# Patient Record
Sex: MALE | URBAN - METROPOLITAN AREA
[De-identification: names, ages, dates, MRNs, and addresses within clinical notes are randomized per-mention and may not be internally consistent; named-entity substitution may affect disease eponyms.]

---

## 2023-05-05 ENCOUNTER — TELEPHONE (OUTPATIENT)
Dept: NEUROLOGY | Facility: CLINIC | Age: 53
End: 2023-05-05
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
## 2023-05-10 RX ORDER — CITALOPRAM 40 MG/1
40 TABLET ORAL DAILY
COMMUNITY
Start: 2023-02-03

## 2023-05-10 RX ORDER — CHOLECALCIFEROL (VITAMIN D3) 125 MCG
CAPSULE ORAL DAILY
COMMUNITY
Start: 2023-04-19

## 2023-05-12 ENCOUNTER — TELEPHONE (OUTPATIENT)
Dept: NEUROLOGY | Facility: CLINIC | Age: 53
End: 2023-05-12

## 2023-05-12 NOTE — TELEPHONE ENCOUNTER
I do not see documentation that someone called pt  Pt has an appt  Monday 5/12 with dr Damaris Mayes       Did the  or MA call pt?
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
## 2023-05-15 ENCOUNTER — OFFICE VISIT (OUTPATIENT)
Dept: NEUROLOGY | Facility: CLINIC | Age: 53
End: 2023-05-15

## 2023-05-15 VITALS
BODY MASS INDEX: 38.54 KG/M2 | TEMPERATURE: 98.2 F | DIASTOLIC BLOOD PRESSURE: 86 MMHG | OXYGEN SATURATION: 96 % | HEIGHT: 73 IN | WEIGHT: 290.8 LBS | HEART RATE: 71 BPM | SYSTOLIC BLOOD PRESSURE: 124 MMHG

## 2023-05-15 DIAGNOSIS — F43.10 PTSD (POST-TRAUMATIC STRESS DISORDER): ICD-10-CM

## 2023-05-15 DIAGNOSIS — G47.9 SLEEP DIFFICULTIES: ICD-10-CM

## 2023-05-15 DIAGNOSIS — E66.9 OBESITY (BMI 30-39.9): ICD-10-CM

## 2023-05-15 DIAGNOSIS — F41.9 ANXIETY AND DEPRESSION: ICD-10-CM

## 2023-05-15 DIAGNOSIS — R26.89 BALANCE PROBLEM: ICD-10-CM

## 2023-05-15 DIAGNOSIS — G43.009 MIGRAINE WITHOUT AURA AND WITHOUT STATUS MIGRAINOSUS, NOT INTRACTABLE: ICD-10-CM

## 2023-05-15 DIAGNOSIS — R29.818 SUSPECTED SLEEP APNEA: ICD-10-CM

## 2023-05-15 DIAGNOSIS — R41.3 MEMORY PROBLEM: ICD-10-CM

## 2023-05-15 DIAGNOSIS — F32.A ANXIETY AND DEPRESSION: ICD-10-CM

## 2023-05-15 DIAGNOSIS — Z87.820 HISTORY OF CONCUSSION: Primary | ICD-10-CM

## 2023-05-15 RX ORDER — RIZATRIPTAN BENZOATE 10 MG/1
10 TABLET ORAL AS NEEDED
Qty: 10 TABLET | Refills: 3 | Status: SHIPPED | OUTPATIENT
Start: 2023-05-15

## 2023-05-15 RX ORDER — TOPIRAMATE 25 MG/1
TABLET ORAL
Qty: 120 TABLET | Refills: 5 | Status: SHIPPED | OUTPATIENT
Start: 2023-05-15

## 2023-05-15 NOTE — PATIENT INSTRUCTIONS
Additional Testing or Referrals:   -MRI brain  -Sleep study  -Labs: B12, Folate, TSH  -Referral: Occupational therapy    Headache/migraine treatment:   Abortive medications (for immediate treatment of a headache): Ok to take ibuprofen or acetaminophen for headaches, but try to limit the amount and frequency that you are taking to avoid medication overuse/rebound headache  Ideally no more than 3 days per week  Prescription Abortive  Rizatriptan (MAXALT) 10 MG tablet; Take 1 tablet (10 mg total) by mouth once as needed for migraine May repeat in 2 hours if needed  Max 2/24 hours, 10/month  Discussed proper use, possible side effects and risks  Prescription preventive medications for headaches/migraines   (to take every day to help prevent headaches - not to take at the time of headache):  - Topiramate 25 mg nightly for 1 week, then increase to 25 mg in a m  And 25 mg in p m  For 1 week, then take 25 mg in a m  And 50 mg in p m  For 1 week, then take 50 mg in a m  and 50 mg in p m  And continue  - generally the common side effects improve as your body gets used to the medication  If we need to spread out a more gradual increase of the medication on a longer scale we can, just call if any questions or concerns  - if necessary, if the a m  dose is causing side effects we can always have you take the full dose at night instead    *Typically these types of medications take time untill you see the benefit, although some may see improvement in days, often it may take weeks, especially if the medication is being titrated up to a beneficial level  Please contact us if there are any concerns or questions regarding the medication  Lifestyle Recommendations:  - Maintain good sleep hygiene  Going to bed and waking up at consistent times, avoiding excessive daytime naps, avoiding caffeinated beverages in the evening, avoid excessive stimulation in the evening and generally using bed primarily for sleeping    One hour before bedtime would recommend turning lights down lower, decreasing your activity (may read quietly, listen to music at a low volume)  When you get into bed, should eliminate all technology (no texting, emailing, playing with your phone, iPad or tablet in bed)  - Maintain good hydration  Drink  2L of fluid a day (4 typical small water bottles)  - Maintain good nutrition  In particular don't skip meals and eat balanced meals regularly  Education and Follow-up  - Please contact us if any questions or concerns arise  Of course, try to protect yourself from head injuries, and if any new concerning symptoms or significant blow to the head or body go to the emergency department    - Follow up in 6 months

## 2023-05-15 NOTE — PROGRESS NOTES
Review of Systems   Constitutional: Negative  Negative for appetite change and fever  HENT: Negative  Negative for hearing loss, tinnitus, trouble swallowing and voice change  Eyes: Negative  Negative for photophobia, pain and visual disturbance  Respiratory: Negative  Negative for shortness of breath  Cardiovascular: Negative  Negative for palpitations  Gastrointestinal: Negative  Negative for nausea and vomiting  Dry heaving   Endocrine: Negative  Negative for cold intolerance  Genitourinary: Negative  Negative for dysuria, frequency and urgency  Musculoskeletal: Negative  Negative for gait problem, myalgias and neck pain  Skin: Negative  Negative for rash  Allergic/Immunologic: Negative  Neurological: Positive for tremors (left hand) and headaches  Negative for dizziness, seizures, syncope, facial asymmetry, speech difficulty, weakness, light-headedness and numbness  Hematological: Negative  Does not bruise/bleed easily  Psychiatric/Behavioral: Positive for sleep disturbance  Negative for agitation, confusion and hallucinations  Memory loss  Mood differences   All other systems reviewed and are negative
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
## 2023-05-18 ENCOUNTER — TELEPHONE (OUTPATIENT)
Dept: SLEEP CENTER | Facility: CLINIC | Age: 53
End: 2023-05-18

## 2023-05-18 NOTE — TELEPHONE ENCOUNTER
----- Message from Bruno Calle MD sent at 5/17/2023  2:35 PM EDT -----  Approved    ----- Message -----  From: Scott العلي  Sent: 5/16/2023   8:21 AM EDT  To: Sleep Medicine [facility] Provider    This home sleep study needs approval      If approved please sign and return to clerical pool  If denied please include reasons why  Also provide alternative testing if warranted  Please sign and return to clerical pool

## 2023-05-23 ENCOUNTER — EVALUATION (OUTPATIENT)
Dept: OCCUPATIONAL THERAPY | Facility: CLINIC | Age: 53
End: 2023-05-23

## 2023-05-23 DIAGNOSIS — R41.3 MEMORY PROBLEM: ICD-10-CM

## 2023-05-23 DIAGNOSIS — F43.10 PTSD (POST-TRAUMATIC STRESS DISORDER): ICD-10-CM

## 2023-05-23 DIAGNOSIS — Z87.820 HISTORY OF CONCUSSION: Primary | ICD-10-CM

## 2023-05-23 DIAGNOSIS — F41.9 ANXIETY AND DEPRESSION: ICD-10-CM

## 2023-05-23 DIAGNOSIS — F32.A ANXIETY AND DEPRESSION: ICD-10-CM
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
## 2023-05-26 ENCOUNTER — TELEPHONE (OUTPATIENT)
Dept: NEUROLOGY | Facility: CLINIC | Age: 53
End: 2023-05-26

## 2023-05-26 NOTE — TELEPHONE ENCOUNTER
Received from Matilda [employer] 429 request for Attending Physicians Statement to be completed for patient and request for medical records  Please advise if Dr Ginger Ross would be agreeable to complete the form so charges can dropped and patient contacted for payment    Form was scanned into  and also faxed to [facility] for records request

## 2023-05-30 ENCOUNTER — EVALUATION (OUTPATIENT)
Dept: SPEECH THERAPY | Facility: CLINIC | Age: 53
End: 2023-05-30

## 2023-05-30 ENCOUNTER — EVALUATION (OUTPATIENT)
Dept: PHYSICAL THERAPY | Facility: CLINIC | Age: 53
End: 2023-05-30

## 2023-05-30 DIAGNOSIS — R41.3 MEMORY PROBLEM: ICD-10-CM

## 2023-05-30 DIAGNOSIS — Z87.820 HISTORY OF CONCUSSION: ICD-10-CM

## 2023-05-30 DIAGNOSIS — R26.89 IMBALANCE: Primary | ICD-10-CM

## 2023-05-30 DIAGNOSIS — R48.8 OTHER SYMBOLIC DYSFUNCTIONS: ICD-10-CM

## 2023-05-30 DIAGNOSIS — G44.86 CERVICOGENIC HEADACHE: ICD-10-CM

## 2023-05-30 DIAGNOSIS — R41.841 COGNITIVE COMMUNICATION DEFICIT: Primary | ICD-10-CM

## 2023-05-30 NOTE — PROGRESS NOTES
Speech-Language Pathology Initial Evaluation    Today's date: 2023   Patient’s name: Angelina Rascon  : 1970  MRN: [id number]  Safety measures:   Referring provider: DO Shazia Jc Diagnosis     ICD-10-CM    1  History of concussion  Z87 820 Ambulatory Referral to Speech Therapy      2  Memory problem  R41 3 Ambulatory Referral to Speech Therapy        Visit tracking:  -Referring provider: Epic  -Billing guidelines: AMA  -Visit # 1  -Insurance: [employer]  -RE due: 23    Subjective comments: Pleasant/Cooperative    How did the patient hear about us? Physician    Patient's goal(s): Functioning at work - improve executive functioning  Currently has lack of concentration and focus to do even simple tasks  Reason for referral: Change in cognitive status  Prior functional status: Communication effective and appropriate in all situations  Clinically complex situations: N/A    History: Patient is a 47 y o  male who was referred to outpatient skilled Speech Therapy services for a cognitive-linguistic evaluation  Patient with vast history of concussions ever since he was very young  Patient referred to SLP from OT evaluation  Patient accompanied by long term girlfriend/ Sherrie Mireles  Hearing: WFL  Vision: near sighted, asigmatism    Home environment/lifestyle: Sherrie Mireles - girlfriend  Highest level of education: Bachelors Degree  Vocational status: Works in regulatory affairs   - indicated workplace would benefit from guidance in managing cognition in work tasks      Mental status: Alert  Behavior status: Cooperative  Patient reported symptoms of: depression, anxiety and frustration  Communication modalities: Verbal  Rehabilitation prognosis: Fair rehab potential to reach the established goals    Assessments    The Repeatable Battery for the Assessment of Neuropsychological Status (RBANS) is a brief, individually-administered assessment which measures attention, language, visuospatial/constructional abilities, and immediate & delayed memory  The RBANS is intended for use with adolescents to adults, ages 15 to 80 years  The following results were obtained during the administration of the assessment  Form: A    Cognitive Domain/Subtest: Index Score: Percentile Rank: Classification:   IMMEDIATE MEMORY 100 50%ile Average        1  List Learning (26/40)        2  Story Memory (18/24)       VISUOSPATIAL/  CONSTRUCTIONAL 121 92%ile Superior        3  Figure Copy (20/20)        4  Line Orientation (19/20)       LANGUAGE 105 63%ile Average        5  Picture Naming (10/10)        6  Semantic Fluency (25/40)       ATTENTION 112 79%ile High Average        7  Digit Span (10/16)        8  Coding (59/89)       DELAYED MEMORY 108 70%ile Average        9  List Recall (6/10)        10  List Recognition (20/20)        11  Story Recall (11/12)        12  Figure Recall (17/20)         Sum of Index Scores:  546   Total Score:  113   Percentile: 81%ile   Classification: High Average       *Patient named 25  concrete category members () in 60 sec (norm=15+)  -- AVERAGE        Goals    Short-term goals:    Complete executive function task list     Complete falls, risk, harm assessment  Patient will be educated on word finding strategies (i e , circumlocution) for improved generative naming and verbal expression skills  Patient will complete complex auditory attention processing tasks (e g , sentence unscramble, ranking numbers/words, etc ) in increased distractible environment to improve working memory with 80% accuracy  Patient will facilitate planning by completing thought organization tasks (e g , sequencing, deduction puzzles, etc ) with 80% accuracy to facilitate increased executive functioning, working memory, problem solving, and processing skills      Patient will complete auditory immediate and short term memory tasks to 80% accuracy to facilitate increased ability to retell narratives and recall information within functional living environment  To target mental manipulation and working memory, patient will participate in word finding activity (i e , anagrams) with 80% accuracy  Patient will answer questions regarding story read aloud in quiet and distractible environment with 80% accuracy to facilitate improved auditory comprehension and recall  Patient will complete reading comprehension tasks (e g , answering questions, following complex written directions, etc ) to 80% accuracy to facilitate carryover of comprehension of functional reading materials  Patient will demonstrate divided attention by responding to multiple tasks or details within tasks at the same time with min cues in a distracting environment with 80% accuracy  Patient will complete semi complex & complex tasks of increasing difficulty with multiple steps with fading cues to increase organization and accuracy with 80% accuracy  Patient will express understanding of ways to maximize attention/focus, problem solving and recall in work place tasks with independence  Patient will be educated on the use of internal and external memory aids and compensatory strategies to maximize attention/focus with 80% accuracy to facilitate increased recall of routine, personal information, and recent events  Long Term    Patient will complete cognitive-linguistic therapy that addresses patient's specific deficits in processing speed, short-term working memory, attention to detail, monitoring, sequencing, and organization skills, with instruction, to alleviate effects of executive functioning disorder deficits by discharge  Patient will complete higher-level expressive language tasks (e g , word definitions, idioms, synonym/antonyms, etc) with 80% accuracy to improve functional communication skills by discharge       Patient will improve ability to facilitate cognitive function and communication skills including use of "compensatory strategies in a variety of functional living tasks to improve quality of like and to maximize level of independence  Impressions/Recommendations    Impressions:   -Patient presents with cognitive deficits (mild) in areas of recall, word retrieval, and executive functioning as a result of vast history of many concussions  Patient noted he experiences rage and anxiety which likely further negatively complicates patient's ability to perform home, work & community tasks  Although patient performed quite well in the Repeatable Battery Asssessment of Neuropsychological Status- Form A (RBANS) with an overall score of \"High Average\" and sub areas performing with a range of Average to Superior, patient still recommended for cognitive therapy as this SLP feels this formal assessment did not capture patient's continual and daily cognitive challenges  Goals established above and do highlight to be completed in range of distraction  Education will be the initial of focus on therapy and will focus on ways to maximize focus/attention, recall & word retrieval at home, community and work environments  Further therapy will include cognitive retraining tasks  Recommendations:  -Patient would benefit from outpatient skilled Speech Therapy services: Cognitive-linguistic therapy    -Frequency: 2x weekly  -Duration: 6-8 weeks    -Intervention certification from: 9/92/9812  -Intervention certification to: 9/48/6305    -Intervention comments:   Dayna Herzog, unable to do taxes, strategies for word retrieval, recall, attention, anxiety/stress, coping mechanisms, workplace tasks list and ways to maximize cognition, executive function list, word retrieval tasks, executive function tasks of increased difficulty in increased distractible environments, deduction puzzles, memory tasks with increased distraction, ?  Melodic intonation therapy  "

## 2023-05-30 NOTE — PROGRESS NOTES
"PT Evaluation     Today's date: 2023  Patient name: Angelina Rascon  : 1970  MRN: [id number]  Referring provider: Parvin Espinoza DO  Dx:   Encounter Diagnosis     ICD-10-CM    1  History of concussion  Z87 820 Ambulatory Referral to Physical Therapy                     Assessment  Assessment details: Pt is a 47 y o  male who presents to OP PT for IE following referral for h/o concussion  Pt c/o decreased balance and \"pressure in L side of head\"  Pt w/ other complaints consistent w/ OT and ST needs, has services in place already  Upon formal assessment pt demonstrates, + cervical flexion rotation test to L indicating likelihood of cervicogenic HA  Pt also w/ TTP and hypertonia in SO, UT, LS, SCM, and AS muscles  Given these findings pt is recommended for skilled PT intervention 2x wk  FGA testing does not indicate pt is at an elevated risk for falls but given pt's typical activities and high rates of self-reported falls he would benefit from higher level balance training in addition to treatment of cervicogenic HA  Pt agreeable to POC  Pt also to acquire referral for ortho PT to address c/o R hip and knee pain  Understanding of Dx/Px/POC: good   Prognosis: good    Goals  STGs (to be achieved w/i 2-4 weeks)  1  Will obtain referral for ortho PT for R hip/knee pain  LTGs (to be achieved within 6-8 weeks)   1  Pt will be I with HEP at d/c to promote PT carry-over  2  Pt will meet FOTO predicted score  3  Pt will report improvements in \"head pressure\", no greater than 2/10 1-2x week  4  Pt will report increased confidence in balance abilities         Plan  Planned therapy interventions: joint mobilization, therapeutic activities, therapeutic exercise, neuromuscular re-education and gait training  Frequency: 2x week  Duration in weeks: 8  Treatment plan discussed with: patient (SO)        Subjective Evaluation    History of Present Illness  Mechanism of injury: Pt with history of multiple " concussions/ head traumas from various mechanisms, with the most recent being approx 6 years ago, when he had a coughing spell, leaned forward, passed out and fell on his head  Pt reports cont progression of symptoms over the last 5 years, with worsening symptoms over the last 6 months  Symptoms include: imbalance, L hand tremor, numbness in R 3 digit of hand, sensitivity to light, and dull pressure of L frontal and parietal regions of head  Also c/o non-PT related symptoms including confusion, memory and executive function deficits, and increased anxiety  Pt is no longer driving 2/2 symptoms  HA/head pressure is worsened by lack of sleep, working on computer, and hard/new tasks  H/o neck and B shoulder issues including subluxing at shoulders  Balance issues began several years ago and are worsening  Nearly falls at least once a week  Denies neuropathy or LE radiculopathy  Does have R hip and knee issues and has to stop and stretch IT several times during 1 5 mile walks he does on several times per week  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  Objective     Palpation   Left   Hypertonic in the cervical paraspinals, scalenes, sternocleidomastoid, suboccipitals and upper trapezius  Tenderness of the cervical paraspinals, scalenes, sternocleidomastoid, suboccipitals and upper trapezius  Trigger point to scalenes and upper trapezius  Right   Hypertonic in the cervical paraspinals, scalenes, sternocleidomastoid, suboccipitals and upper trapezius  Tenderness of the cervical paraspinals, scalenes, sternocleidomastoid, suboccipitals and upper trapezius  Trigger point to scalenes       Active Range of Motion   Cervical/Thoracic Spine     Normal active range of motion    Tests   Cervical     Left   Positive cervical flexion-rotation test     Neuro Exam:     Oculomotor exam   Oculomotor ROM: WNL  Smooth pursuits: within normal limits  Vertical saccades: hypometric  Horizontal saccades: hypometric   Convergence: normal  Dynamic head: VOR testing: blurriness of target @ 1 Hz    Coordination   Heel to shin: left WNL and right WNL  Finger to nose: left dysmetria and right dysmetria (R>L)  Rapid alternating movements: UE WNL and LE impaired     Outcome measure IE   FGA 28/30   Kay 50/56                MCTSIB Number of Seconds   Feet Together, Eyes Open 30   Feet Together, Eyes Closed 30   Feet Together, Eyes Open Foam 30   Feet Together, Eyes Closed Foam 30 w/ minimal sway            Precautions: cluster HA, PTSD     * HEP: give NV please      Manuals 5/30            SOR VR            Manual cervical traction VR            PA mobs (C3-C5) VR            Unilateral PA pressure L side VR            Neuro Re-Ed             Walking over hurdles w/ foam between             Tandem amb NV Give for HEP           Walking on foam w/ cone taps             Walking over hidden objects                                                    Ther Ex             Pt education HEP, POC, findings, adding R hip/knee dx            UT stretch NV Give for HEP           LS stretch NV Give for HEP           Cervical rotation SNAGs NV            Cervical ext SNAGs NV                                                   Ther Activity                                       Gait Training                                       Modalities

## 2023-05-31 DIAGNOSIS — M25.551 RIGHT HIP PAIN: ICD-10-CM

## 2023-05-31 DIAGNOSIS — M76.30 IT BAND SYNDROME: Primary | ICD-10-CM

## 2023-06-01 ENCOUNTER — OFFICE VISIT (OUTPATIENT)
Dept: OCCUPATIONAL THERAPY | Facility: CLINIC | Age: 53
End: 2023-06-01

## 2023-06-01 DIAGNOSIS — Z87.820 HISTORY OF CONCUSSION: Primary | ICD-10-CM

## 2023-06-01 DIAGNOSIS — F41.9 ANXIETY AND DEPRESSION: ICD-10-CM

## 2023-06-01 DIAGNOSIS — F32.A ANXIETY AND DEPRESSION: ICD-10-CM

## 2023-06-01 DIAGNOSIS — F43.10 PTSD (POST-TRAUMATIC STRESS DISORDER): ICD-10-CM

## 2023-06-01 DIAGNOSIS — R41.3 MEMORY PROBLEM: ICD-10-CM

## 2023-06-01 NOTE — PROGRESS NOTES
"  Occupational Therapy Daily Note:    Today's date: 2023  Patient name: Oksana Elizondo  : 1970  MRN: [id number]  Referring provider: Cristina Richard, DO  Dx:   Encounter Diagnoses   Name Primary? • History of concussion Yes   • Memory problem    • Anxiety and depression    • PTSD (post-traumatic stress disorder)        Subjective: \"I've been okay\"    Objective: Pt engaged in skilled OT treatment session with focus on fxnl cognition, short term memory, healthy coping education and leisure pursuits to increase engagement, endurance, tolerance, and independence with daily ADL and IADL tasks  CPT Code Minutes                                           Task Details        Therapeutic Activity  Pt engaged in multiple activities to improve functional cognition and memory  Pt education on memory strategies of repition, reading out loud, and note-taking  Auditory Memory Test 1    Pt recalled 4/16 short story details on after listening to the story once  After hearing story again, pt recalled 15/16 story details  Auditory Memory Test 2   Pt recalled 80% after first trial  During second trial, with note-taking, pt was able to recall 100%  Reading Comprehension Task:  Pt presented with four paragraph story and instructed to read through it twice  Pt answered questions with 100% accuracy  Anxiety Management:   Began discussion of anxiety management techniques  Pt does not currently have any  Working on developing strategies for pt, including identification of leisure pursuits to address anxiety and depression  Leisure Pursuits:   Pt identified 5 activities that he enjoys and makes him happy  (Kids, drumming, animals, gardening (physical; not planning), cooking)                  Neuro Re-Ed               Therapeutic Exercise               Manual          Self Care           Assessment: Tolerated treatment well  Pt performed better with use of memory strategies   Pt reports taking minimal time " to do things for himself that make him happy  Patient would benefit from continued skilled OT  Plan: Continued skilled OT per POC       PLANNED THERAPY INTERVENTIONS:  Therapeutic activity  Therapeutic exercise  Functional cognition   IADL re-training  RTW simulations   Internal and external memory aides  Sustained/alternating/divided attention  Memory and mental manipulation  Auditory processing with immediate recall  Memory retention with immediate and delayed recall  Sensory re-ed           INTERVENTION COMMENTS:  Diagnosis: History of concussion [Z87 820]  Precautions:   Insurance: Payor: Mich Casillas / Plan: Mich Casillas PPO / Product Type: PPO /   Visits: 2  PN due 6/20/23

## 2023-06-02 ENCOUNTER — APPOINTMENT (OUTPATIENT)
Dept: OCCUPATIONAL THERAPY | Facility: CLINIC | Age: 53
End: 2023-06-02
Payer: COMMERCIAL

## 2023-06-02 ENCOUNTER — HOSPITAL ENCOUNTER (OUTPATIENT)
Dept: MRI IMAGING | Facility: HOSPITAL | Age: 53
End: 2023-06-02
Attending: STUDENT IN AN ORGANIZED HEALTH CARE EDUCATION/TRAINING PROGRAM
Payer: COMMERCIAL

## 2023-06-02 DIAGNOSIS — Z87.820 HISTORY OF CONCUSSION: ICD-10-CM

## 2023-06-02 DIAGNOSIS — R41.3 MEMORY PROBLEM: ICD-10-CM

## 2023-06-02 DIAGNOSIS — G43.009 MIGRAINE WITHOUT AURA AND WITHOUT STATUS MIGRAINOSUS, NOT INTRACTABLE: ICD-10-CM

## 2023-06-02 PROCEDURE — G1004 CDSM NDSC: HCPCS

## 2023-06-02 PROCEDURE — 70553 MRI BRAIN STEM W/O & W/DYE: CPT

## 2023-06-02 PROCEDURE — A9585 GADOBUTROL INJECTION: HCPCS | Performed by: STUDENT IN AN ORGANIZED HEALTH CARE EDUCATION/TRAINING PROGRAM

## 2023-06-02 RX ADMIN — GADOBUTROL 13 ML: 604.72 INJECTION INTRAVENOUS at 12:56

## 2023-06-06 ENCOUNTER — OFFICE VISIT (OUTPATIENT)
Dept: SPEECH THERAPY | Facility: CLINIC | Age: 53
End: 2023-06-06
Payer: COMMERCIAL

## 2023-06-06 ENCOUNTER — OFFICE VISIT (OUTPATIENT)
Dept: OCCUPATIONAL THERAPY | Facility: CLINIC | Age: 53
End: 2023-06-06
Payer: COMMERCIAL

## 2023-06-06 ENCOUNTER — OFFICE VISIT (OUTPATIENT)
Dept: PHYSICAL THERAPY | Facility: CLINIC | Age: 53
End: 2023-06-06
Payer: COMMERCIAL

## 2023-06-06 DIAGNOSIS — R41.841 COGNITIVE COMMUNICATION DEFICIT: ICD-10-CM

## 2023-06-06 DIAGNOSIS — G44.86 CERVICOGENIC HEADACHE: ICD-10-CM

## 2023-06-06 DIAGNOSIS — F32.A ANXIETY AND DEPRESSION: ICD-10-CM

## 2023-06-06 DIAGNOSIS — Z87.820 HISTORY OF CONCUSSION: Primary | ICD-10-CM

## 2023-06-06 DIAGNOSIS — R41.3 MEMORY PROBLEM: ICD-10-CM

## 2023-06-06 DIAGNOSIS — R48.8 OTHER SYMBOLIC DYSFUNCTIONS: ICD-10-CM

## 2023-06-06 DIAGNOSIS — F41.9 ANXIETY AND DEPRESSION: ICD-10-CM

## 2023-06-06 DIAGNOSIS — R26.89 IMBALANCE: ICD-10-CM

## 2023-06-06 DIAGNOSIS — F43.10 PTSD (POST-TRAUMATIC STRESS DISORDER): ICD-10-CM

## 2023-06-06 PROCEDURE — 97140 MANUAL THERAPY 1/> REGIONS: CPT

## 2023-06-06 PROCEDURE — 97112 NEUROMUSCULAR REEDUCATION: CPT

## 2023-06-06 PROCEDURE — 92507 TX SP LANG VOICE COMM INDIV: CPT

## 2023-06-06 PROCEDURE — 97530 THERAPEUTIC ACTIVITIES: CPT

## 2023-06-06 PROCEDURE — 97110 THERAPEUTIC EXERCISES: CPT

## 2023-06-06 NOTE — PROGRESS NOTES
"Daily Note     Today's date: 2023  Patient name: Batool Hutchinson  : 1970  MRN: [id number]  Referring provider: Martin Cedeno DO  Dx:   Encounter Diagnosis     ICD-10-CM    1  History of concussion  Z87 820       2  Imbalance  R26 89       3  Cervicogenic headache  G44 86           Start Time:   Stop Time: 9593  Total time in clinic (min): 43 minutes    Subjective: Pt felt imbalance most recently walking in his garden wasn't sure what contributed to it  Has headache today, from back of L side of head and to the front  Objective: See treatment diary below      Assessment:  Pt had some relief with manuals on his cervical spine in stiffness/pain in neck as well as head pressure  SNAGs with good form and improving ROM, instructed for HEP  Potential for imbalance coming from R sided pain/feelings of instability and compensating with L side  Better feeling walking bwd vs fwd with head turns  Some path deviation with head movement  Instructed to look for contributions to imbalance such as head movement, distraction/dual task, or R sided pain/instability - pt verbalized understanding  Patient would benefit from continued PT      Plan: Continue per plan of care  Cervical management  Balance       Precautions: cluster HA, PTSD     * HEP: give NV please      Manuals            SOR VR AF           Manual cervical traction VR AF           PA mobs (C3-C5) VR            Unilateral PA pressure L side VR            Neuro Re-Ed             Walking over hurdles w/ foam between             Tandem amb NV            Walking on foam w/ cone taps             Walking over hidden objects             HT amb  20ft x4, x2 bwd           Foam  Mod tandem EC 30\" x1 ea                        Ther Ex             Pt education HEP, POC, findings, adding R hip/knee dx            UT stretch NV            LS stretch NV            Cervical rotation SNAGs NV x5 ea 5'           Cervical ext SNAGs NV            Lumbar flexion  x8  " Lumbar side bend  x8 R                        Ther Activity                                       Gait Training                                       Modalities

## 2023-06-06 NOTE — PROGRESS NOTES
Occupational Therapy Daily Note:    Today's date: 2023  Patient name: Scott Pritchard  : 1970  MRN: [id number]  Referring provider: Kusum De Oliveira DO   Dx:   Encounter Diagnoses   Name Primary? • History of concussion Yes   • Memory problem    • Anxiety and depression    • PTSD (post-traumatic stress disorder)        Subjective: Its not been a very good day  Objective: Pt engaged in skilled OT treatment session with focus on fxnl cognition, short term memory, healthy coping education and leisure pursuits to increase engagement, endurance, tolerance, and independence with daily ADL and IADL tasks  CPT Code Minutes                                           Task Details        Therapeutic Activity  Pt engaged in multiple activities to improve functional cognition and memory  Pt education on memory strategies of note-taking  Recall Task:  Pt was able to recall 100% of the long story main details from last session with no notes  Pt was able to recall 50% of details from the short story without notes; 95% percent recalled with use of notes  Noted difficulty capturing all pertinent information when given verbal information due to time constraints and memory  Pt was given the original short story to fix his notes with all important information  Will retest next session  Pt does report difficulty with note-taking during work meetings  Educated patient on asking speaker to stop/pause for him to ask or to clarify and repeat back at the end  Anxiety Management:   Continued discussion of anxiety management techniques  Pt does not currently use anxiety management techniques; working on developing strategies for pt  through continued identification of leisure pursuits to address anxiety and depression  Mental Health:   Pt reporting increased stress with interaction with his sister   Pt reports working with psychologist to identify strategies for stress like boundary setting; pt able to identify areas where boundaries need to be set and how he can set them  Leisure Pursuits:   Pt identified 1 additional (total 6) activity which he can do to make him happy  (Kids, drumming, animals, gardening (physical; not planning), cooking, Monday nights with Betty Dodd)     Pt instructed to take time before next session to do one leisure activity on his list to address his anxiety and depression  Neuro Re-Ed               Therapeutic Exercise               Manual          Self Care           Assessment: Tolerated treatment well  Pt performed better with use of notes for recall  Pt able to fix notes, plan to have pt recall short story with fixed notes next session  Pt reports continued taking minimal time to do things for himself that make him happy  Will continue to address his leisure participation  Patient would benefit from continued skilled OT  Plan: Continued skilled OT per POC       PLANNED THERAPY INTERVENTIONS:  Therapeutic activity  Therapeutic exercise  Functional cognition   IADL re-training  RTW simulations   Internal and external memory aides  Sustained/alternating/divided attention  Memory and mental manipulation  Auditory processing with immediate recall  Memory retention with immediate and delayed recall  Sensory re-ed           INTERVENTION COMMENTS:  Diagnosis: History of concussion [Z87 820]  Precautions:   Insurance: Payor: Kye Oliva / Plan: Kye Oliva PPO / Product Type: PPO /   Visits: 3  PN due 6/20/23

## 2023-06-06 NOTE — PROGRESS NOTES
Daily Speech Treatment Note    Today's date: 2023  Patient’s name: Gordy Nuñez  : 1970  MRN: [id number]  Safety measures:   Referring provider: DO Shazia Monahan Diagnosis     ICD-10-CM    1  History of concussion  Z87 820       2  Memory problem  R41 3       3  Cognitive communication deficit  R41 841       4  Other symbolic dysfunctions  U87 6         Visit trackin    Subjective/Behavioral:  - Pleasant/Cooperative        Objective/Assessment:  Completed structured activities with patient to target goals below  Results as stated below  Patient notes left hand tremor  Next session: work on word retrieval, short term recall and start problem solving tasks, provide info on apps ideas  Short-term goals:       Complete executive function task list   : Provided list  Patient circled most of tasks that he has difficulty with with high priorities being:      Complete falls, risk, harm assessment    23: Completed       Patient will be educated on word finding strategies (i e , circumlocution) for improved generative naming and verbal expression skills      Patient will complete complex auditory attention processing tasks (e g , sentence unscramble, ranking numbers/words, etc ) in increased distractible environment to improve working memory with 80% accuracy      Patient will facilitate planning by completing thought organization tasks (e g , sequencing, deduction puzzles, etc ) with 80% accuracy to facilitate increased executive functioning, working memory, problem solving, and processing skills      Patient will complete auditory immediate and short term memory tasks to 80% accuracy to facilitate increased ability to retell narratives and recall information within functional living environment      To target mental manipulation and working memory, patient will participate in word finding activity (i e , anagrams) with 80% accuracy      Patient will answer questions regarding story read aloud in quiet and distractible environment with 80% accuracy to facilitate improved auditory comprehension and recall      Patient will complete reading comprehension tasks (e g , answering questions, following complex written directions, etc ) to 80% accuracy to facilitate carryover of comprehension of functional reading materials      Patient will demonstrate divided attention by responding to multiple tasks or details within tasks at the same time with min cues in a distracting environment with 80% accuracy      Patient will complete semi complex & complex tasks of increasing difficulty with multiple steps with fading cues to increase organization and accuracy with 80% accuracy      Patient will express understanding of ways to maximize attention/focus, problem solving and recall in work place tasks with independence  6/6: Educated on ways to maximize attention and focus, provided handout  Patient expressed understanding   ? Invenias indira          Patient will be educated on the use of internal and external memory aids and compensatory strategies to maximize attention/focus with 80% accuracy to facilitate increased recall of routine, personal information, and recent events         Long Term     Patient will complete cognitive-linguistic therapy that addresses patient's specific deficits in processing speed, short-term working memory, attention to detail, monitoring, sequencing, and organization skills, with instruction, to alleviate effects of executive functioning disorder deficits by discharge      Patient will complete higher-level expressive language tasks (e g , word definitions, idioms, synonym/antonyms, etc) with 80% accuracy to improve functional communication skills by discharge       Patient will improve ability to facilitate cognitive function and communication skills including use of compensatory strategies in a variety of functional living tasks to improve quality of like and to maximize level of independence                   Plan:  -Continue with current plan of care  Mita Ramirez, unable to do taxes, strategies for word retrieval, recall, attention, anxiety/stress, coping mechanisms, workplace tasks list and ways to maximize cognition, executive function list, word retrieval tasks, executive function tasks of increased difficulty in increased distractible environments, deduction puzzles, memory tasks with increased distraction, ?  Melodic intonation therapy

## 2023-06-08 ENCOUNTER — OFFICE VISIT (OUTPATIENT)
Dept: PHYSICAL THERAPY | Facility: CLINIC | Age: 53
End: 2023-06-08
Payer: COMMERCIAL

## 2023-06-08 ENCOUNTER — OFFICE VISIT (OUTPATIENT)
Dept: OCCUPATIONAL THERAPY | Facility: CLINIC | Age: 53
End: 2023-06-08
Payer: COMMERCIAL

## 2023-06-08 DIAGNOSIS — Z87.820 HISTORY OF CONCUSSION: Primary | ICD-10-CM

## 2023-06-08 DIAGNOSIS — F43.10 PTSD (POST-TRAUMATIC STRESS DISORDER): ICD-10-CM

## 2023-06-08 DIAGNOSIS — F32.A ANXIETY AND DEPRESSION: ICD-10-CM

## 2023-06-08 DIAGNOSIS — F41.9 ANXIETY AND DEPRESSION: ICD-10-CM

## 2023-06-08 DIAGNOSIS — G44.86 CERVICOGENIC HEADACHE: ICD-10-CM

## 2023-06-08 DIAGNOSIS — R41.3 MEMORY PROBLEM: ICD-10-CM

## 2023-06-08 DIAGNOSIS — R26.89 IMBALANCE: ICD-10-CM

## 2023-06-08 PROCEDURE — 97112 NEUROMUSCULAR REEDUCATION: CPT

## 2023-06-08 PROCEDURE — 97530 THERAPEUTIC ACTIVITIES: CPT

## 2023-06-08 PROCEDURE — 97110 THERAPEUTIC EXERCISES: CPT

## 2023-06-08 PROCEDURE — 97140 MANUAL THERAPY 1/> REGIONS: CPT

## 2023-06-08 NOTE — PROGRESS NOTES
Occupational Therapy Daily Note:    Today's date: 2023  Patient name: Vicki Jamil  : 1970  MRN: [id number]  Referring provider: Carlos Ramirez DO   Dx:   Encounter Diagnoses   Name Primary? • History of concussion Yes   • Memory problem    • Anxiety and depression    • PTSD (post-traumatic stress disorder)        Subjective: pt without complaints     Objective: Pt engaged in skilled OT treatment session with focus on fxnl cognition, short term memory, healthy coping education and leisure pursuits to increase engagement, endurance, tolerance, and independence with daily ADL and IADL tasks  CPT Code Minutes                                           Task Details        Therapeutic Activity  Focus of session on increasing participation in leisure activities to improve life balance and overall health, as well as identifying triggers, and working on management of triggers  Leisure engagement: pt is working on making a space on his back porch where he can set up his drums or have a work space to engage in leisure pursuits  Pt has made an  drum and has the parts to make 2 more to complete a set  Pt also has some drums that he needs to fix, and will be able to use the back porch space in engage in these projects  Life Balance/ schedule management: Discussed chores/ daily tasks that he needs to complete every day; discussed trying to make his days more routine in order to incorporate more leisure activities and self-care activities  Discussed adding meditation into his week, at least 3-4x/ week to assist with anxiety management and to activate his parasympathetic system for better regulation of his emotions/ mood  Pt has identified decreased concentration and decreased motivation as his 2 biggest hurdles  Will cont to work to address  Homework:  1   Pt to take note of all the times he wants to engage in a task/ event but is unable to due to decreased concentration or motivation  Will track until next session  2  Add meditation 3-4x/ week                          Neuro Re-Ed               Therapeutic Exercise               Manual          Self Care           Assessment: Tolerated treatment well  Pt making progress towards incorporating leisure and self care activities into daily routine  Patient would benefit from continued skilled OT  Plan: Continued skilled OT per POC          INTERVENTION COMMENTS:  Diagnosis: History of concussion [Z87 820]  Precautions:   Insurance: Payor: Crow July / Plan: [place] July PPO / Product Type: PPO /   Visits: 4  PN due 6/20/23

## 2023-06-08 NOTE — PROGRESS NOTES
"Daily Note     Today's date: 2023  Patient name: Tiffanie Calderon  : 1970  MRN: [id number]  Referring provider: Niya Reagan DO  Dx:   Encounter Diagnosis     ICD-10-CM    1  History of concussion  Z87 820       2  Imbalance  R26 89       3  Cervicogenic headache  G44 86           Start Time: 1545  Stop Time: 1630  Total time in clinic (min): 45 minutes    Subjective: Pt felt off balance when picking items while moving stuff with his son  Objective: See treatment diary below      Assessment:  Referral patterns to L sided HA with cervical manuals, improvement in cervical ROM+pain post  Good balance on foam with cog-motor dual task, occasional stepping strategy needed on foam beam but recovers well  No instability with cone  noted  Good response to aerobic conditioning with bike and sled push with appropriate RPE, notes some knee tightness but overall improvement in his hip/back  Instructed to find aerobic exercise that is enjoyable for him, pt verbalized understanding  Patient would benefit from continued PT      Plan: Continue per plan of care  Cervical management  Balance       Precautions: cluster HA, PTSD     * HEP: give NV please      Manuals           SOR VR AF AF          Manual cervical traction VR AF AF          PA mobs (C3-C5) VR            Unilateral PA pressure L side VR            Neuro Re-Ed             Walking over hurdles w/ foam between             Tandem amb NV            Walking on foam w/ cone taps             Walking over hidden objects             HT amb  20ft x4, x2 bwd Foam beam 8 laps          Foam  Mod tandem EC 30\" x1 ea Cone  cog task x4                       Ther Ex             Pt education HEP, POC, findings, adding R hip/knee dx            UT stretch NV            LS stretch NV            Cervical rotation SNAGs NV x5 ea 5'           Cervical ext SNAGs NV            Lumbar flexion  x8           Lumbar side bend  x8 R           Aerobic   lvl 10 " 6min RPE 5-6/10          Sled push   10plates + 2 57YL 01ME 2x2          Ther Activity                                       Gait Training                                       Modalities

## 2023-06-09 ENCOUNTER — APPOINTMENT (OUTPATIENT)
Dept: SPEECH THERAPY | Facility: CLINIC | Age: 53
End: 2023-06-09
Payer: COMMERCIAL

## 2023-06-12 ENCOUNTER — APPOINTMENT (OUTPATIENT)
Dept: SPEECH THERAPY | Facility: CLINIC | Age: 53
End: 2023-06-12
Payer: COMMERCIAL

## 2023-06-13 ENCOUNTER — OFFICE VISIT (OUTPATIENT)
Dept: PHYSICAL THERAPY | Facility: CLINIC | Age: 53
End: 2023-06-13
Payer: COMMERCIAL

## 2023-06-13 ENCOUNTER — OFFICE VISIT (OUTPATIENT)
Dept: OCCUPATIONAL THERAPY | Facility: CLINIC | Age: 53
End: 2023-06-13
Payer: COMMERCIAL

## 2023-06-13 DIAGNOSIS — F41.9 ANXIETY AND DEPRESSION: ICD-10-CM

## 2023-06-13 DIAGNOSIS — Z87.820 HISTORY OF CONCUSSION: Primary | ICD-10-CM

## 2023-06-13 DIAGNOSIS — R26.89 IMBALANCE: ICD-10-CM

## 2023-06-13 DIAGNOSIS — F32.A ANXIETY AND DEPRESSION: ICD-10-CM

## 2023-06-13 DIAGNOSIS — F43.10 PTSD (POST-TRAUMATIC STRESS DISORDER): ICD-10-CM

## 2023-06-13 DIAGNOSIS — G44.86 CERVICOGENIC HEADACHE: ICD-10-CM

## 2023-06-13 DIAGNOSIS — R41.3 MEMORY PROBLEM: ICD-10-CM

## 2023-06-13 PROCEDURE — 97530 THERAPEUTIC ACTIVITIES: CPT

## 2023-06-13 PROCEDURE — 97140 MANUAL THERAPY 1/> REGIONS: CPT

## 2023-06-13 PROCEDURE — 97110 THERAPEUTIC EXERCISES: CPT

## 2023-06-13 NOTE — PROGRESS NOTES
"Daily Note     Today's date: 2023  Patient name: Samina Castaneda  : 1970  MRN: [id number]  Referring provider: Nara Armenta DO  Dx:   Encounter Diagnosis     ICD-10-CM    1  History of concussion  Z87 820       2  Imbalance  R26 89       3  Cervicogenic headache  G44 86           Start Time: 1415  Stop Time: 1456  Total time in clinic (min): 41 minutes    Subjective: Pt denies any imbalances since last seen  More anxious today  Head/neck is not as bad today  Objective: See treatment diary below      Assessment:  Pt demonsrates good response to soft tissue manuals to cervical spine with good mobility and less pain post  Continuing to develop aerobically with comfortable exercises, has good response to pushing the sled  Improving balance on foam beam with cone , improving pace as well and educated on finding appropriate pace not too slow and not too fast  Discussed incorporating aerobic exercise that he can include into program that does not bother his hip/knee, pt in the process of finding old piece of equipment that he has  Patient would benefit from continued PT      Plan: Continue per plan of care  Cervical management  Balance       Precautions: cluster HA, PTSD     * HEP: give NV please      Manuals          SOR VR AF AF AF         Manual cervical traction VR AF AF AF         PA mobs (C3-C5) VR            Unilateral PA pressure L side VR            Neuro Re-Ed             Walking over hurdles w/ foam between             Tandem amb NV            Walking on foam w/ cone taps             Walking over hidden objects             HT amb  20ft x4, x2 bwd Foam beam 8 laps          Foam  Mod tandem EC 30\" x1 ea Cone  cog task x4 Foam tandem cone  4 laps                      Ther Ex             Pt education HEP, POC, findings, adding R hip/knee dx            UT stretch NV            LS stretch NV            Cervical rotation SNAGs NV x5 ea 5'           Cervical ext " SNAGs NV            Lumbar flexion  x8           Lumbar side bend  x8 R           Aerobic   lvl 10 6min RPE 5-6/10 Lvl 6 recumbent RPE 4/10 5min         Sled push   10plates + 2 62VR 48PJ 2x2 10 plates + 90UI 63NH x4, x2         Ther Activity                                       Gait Training                                       Modalities

## 2023-06-13 NOTE — PROGRESS NOTES
Occupational Therapy Daily Note:    Today's date: 2023  Patient name: Richmond Rosado  : 1970  MRN: [id number]  Referring provider: Linh Armenta DO   Dx:   Encounter Diagnoses   Name Primary? • History of concussion Yes   • Memory problem    • Anxiety and depression    • PTSD (post-traumatic stress disorder)        Subjective: Pt reporting having a emotional day    Objective: Pt engaged in skilled OT treatment session with focus on fxnl cognition, short term memory, healthy coping education and leisure pursuits to increase engagement, endurance, tolerance, and independence with daily ADL and IADL tasks  CPT Code Minutes                                           Task Details        Therapeutic Activity  Session focused on anxiety management and regulation of emotions/mood to improve life balance and overall health  Pt is working on identifying/managing triggers  Pt engaged in a 12-minute meditation session to focus on decreasing anxiety and regulate emotional response  Pt responded well to meditation, reporting a decrease in anxiety and his emotional response after meditation  Continued discussion of adding meditation 3-4x into his week  Pt given a copy and instructed to implement into his self care routine  Continued discussion about increasing engagement in leisure activties  Pt discussed having sound bowls which he can implement into his week for leisure engagement  Calender:   Pt given a blank calender to keep track of fatigue levels, emotional responses, and self-care to assist with life balance and anxiety management  Recall:  Pt was able to recall 100% of short story with the use of his notes from a prior sessions 13 days ago  Pt with min difficulty reading notes  Pt reported feeling like he takes notes out of order   Pt instructed to read notes back in order to increase note accuracy  Plan to engage in note taking activity next session  Homework:  1   Pt to take note of all the times he wants to engage in a task/ event but is unable to due to decreased concentration or motivation  Pt to use calendar to track fatigue levels, emotional responses, and self-care activities  Pt reports he did not complete prior to the session because he was not feeling well  Pt instructed to carry out use of calendar this week for next session  2  Continue to add meditation 3-4x/ week                  Neuro Re-Ed               Therapeutic Exercise               Manual          Self Care           Assessment: Tolerated treatment well  Pt making progress towards incorporating leisure and self care activities into daily routine  Patient would benefit from continued skilled OT  Plan: Continued skilled OT per POC          INTERVENTION COMMENTS:  Diagnosis: History of concussion [Z87 820]  Precautions:   Insurance: Payor: Pinkie Libman / Plan: Pinkie Libman PPO / Product Type: PPO /   Visits: 5  PN due 6/20/23

## 2023-06-15 ENCOUNTER — OFFICE VISIT (OUTPATIENT)
Dept: OCCUPATIONAL THERAPY | Facility: CLINIC | Age: 53
End: 2023-06-15
Payer: COMMERCIAL

## 2023-06-15 ENCOUNTER — OFFICE VISIT (OUTPATIENT)
Dept: SPEECH THERAPY | Facility: CLINIC | Age: 53
End: 2023-06-15
Payer: COMMERCIAL

## 2023-06-15 ENCOUNTER — OFFICE VISIT (OUTPATIENT)
Dept: PHYSICAL THERAPY | Facility: CLINIC | Age: 53
End: 2023-06-15
Payer: COMMERCIAL

## 2023-06-15 DIAGNOSIS — R26.89 IMBALANCE: ICD-10-CM

## 2023-06-15 DIAGNOSIS — Z87.820 HISTORY OF CONCUSSION: Primary | ICD-10-CM

## 2023-06-15 DIAGNOSIS — R41.841 COGNITIVE COMMUNICATION DEFICIT: ICD-10-CM

## 2023-06-15 DIAGNOSIS — R41.3 MEMORY PROBLEM: ICD-10-CM

## 2023-06-15 DIAGNOSIS — F41.9 ANXIETY AND DEPRESSION: ICD-10-CM

## 2023-06-15 DIAGNOSIS — F43.10 PTSD (POST-TRAUMATIC STRESS DISORDER): ICD-10-CM

## 2023-06-15 DIAGNOSIS — F32.A ANXIETY AND DEPRESSION: ICD-10-CM

## 2023-06-15 DIAGNOSIS — G44.86 CERVICOGENIC HEADACHE: ICD-10-CM

## 2023-06-15 PROCEDURE — 97530 THERAPEUTIC ACTIVITIES: CPT

## 2023-06-15 PROCEDURE — 97112 NEUROMUSCULAR REEDUCATION: CPT

## 2023-06-15 PROCEDURE — 92507 TX SP LANG VOICE COMM INDIV: CPT

## 2023-06-15 NOTE — PROGRESS NOTES
Daily Speech Treatment Note    Today's date: 6/15/2023  Patient’s name: Neda Bustamante  : 1970  MRN: [id number]  Safety measures:   Referring provider: Reese Severs, DO Encounter Diagnosis     ICD-10-CM    1  History of concussion  Z87 820       2  Memory problem  R41 3       3  Cognitive communication deficit  R41  841           Visit tracking:  3    Subjective/Behavioral:  - Pleasant/Cooperative        Objective/Assessment:  Completed structured activities with patient to target goals below  Results as stated below  Patient notes left hand tremor  Next session: work on word retrieval, short term recall and start problem solving tasks, provide info on apps ideas  Short-term goals:       Complete executive function task list   : Provided list  Patient circled most of tasks that he has difficulty with with high priorities being:      Complete falls, risk, harm assessment  23: Completed       Patient will be educated on word finding strategies (i e , circumlocution) for improved generative naming and verbal expression skills  6/15: Provided word retrieval strategies, expressed understanding      Patient will complete complex auditory attention processing tasks (e g , sentence unscramble, ranking numbers/words, etc ) in increased distractible environment to improve working memory with 80% accuracy      Patient will facilitate planning by completing thought organization tasks (e g , sequencing, deduction puzzles, etc ) with 80% accuracy to facilitate increased executive functioning, working memory, problem solving, and processing skills      Patient will complete auditory immediate and short term memory tasks to 80% accuracy to facilitate increased ability to retell narratives and recall information within functional living environment      To target mental manipulation and working memory, patient will participate in word finding activity (i e , anagrams) with 80% accuracy    6/15: Completed anagrams with 80% accuracy  Provided packet for home  Also, started work with opposites and synonyms, completed with increased time - 80% accuracy      Patient will answer questions regarding story read aloud in quiet and distractible environment with 80% accuracy to facilitate improved auditory comprehension and recall      Patient will complete reading comprehension tasks (e g , answering questions, following complex written directions, etc ) to 80% accuracy to facilitate carryover of comprehension of functional reading materials      Patient will demonstrate divided attention by responding to multiple tasks or details within tasks at the same time with min cues in a distracting environment with 80% accuracy      Patient will complete semi complex & complex tasks of increasing difficulty with multiple steps with fading cues to increase organization and accuracy with 80% accuracy      Patient will express understanding of ways to maximize attention/focus, problem solving and recall in work place tasks with independence  6/6: Educated on ways to maximize attention and focus, provided handout  Patient expressed understanding  ? [employer] indira          Patient will be educated on the use of internal and external memory aids and compensatory strategies to maximize attention/focus with 80% accuracy to facilitate increased recall of routine, personal information, and recent events    6/15: Provided information on helpful indira for anxiety, insight timer - body scan relaxation, sleep and timer meditation assistance         Long Term     Patient will complete cognitive-linguistic therapy that addresses patient's specific deficits in processing speed, short-term working memory, attention to detail, monitoring, sequencing, and organization skills, with instruction, to alleviate effects of executive functioning disorder deficits by discharge      Patient will complete higher-level expressive language tasks (e g , word definitions, idioms, synonym/antonyms, etc) with 80% accuracy to improve functional communication skills by discharge       Patient will improve ability to facilitate cognitive function and communication skills including use of compensatory strategies in a variety of functional living tasks to improve quality of like and to maximize level of independence                   Plan:  -Continue with current plan of care  [place], unable to do taxes,  recall, anxiety/stress, coping mechanisms, workplace tasks list and ways to maximize cognition, executive function list, word retrieval tasks, executive function tasks of increased difficulty in increased distractible environments, deduction puzzles, memory tasks with increased distraction, ?  Melodic intonation therapy

## 2023-06-15 NOTE — PROGRESS NOTES
Occupational Therapy Daily Note:    Today's date: 6/15/2023  Patient name: Osman Jose  : 1970  MRN: [id number]  Referring provider: Bryan Ray DO   Dx:   Encounter Diagnoses   Name Primary? • History of concussion Yes   • Memory problem    • Anxiety and depression    • PTSD (post-traumatic stress disorder)        Subjective: Pt reporting increased stress and anxiety today     Objective: Pt engaged in skilled OT treatment session with focus on fxnl cognition, short term memory, healthy coping education and leisure pursuits to increase engagement, endurance, tolerance, and independence with daily ADL and IADL tasks  CPT Code Minutes                                           Task Details        Therapeutic Activity  Pt engaged in a series of activities focused on anxiety management and regulation of emotions to improve his overall wellbeing  Pt reports using anxiety management stratgies discussed during prior session during the week  Pt engaged in meditation for de-escalation since last session  Pt has identified the current trigger/aggravating factor is unresolved work/disability issues  Instructed pt to utilize anxiety management techniques prior to and after handing work issues  Pt engaged in legs up the wall yoga pose for 5 minutes to focus on emotional regulation and back pain management  Pt responded well with decreased feelings of anxiety  Pt engaged outdoor grounding activity with box breathing  Pt responded well and enjoyed box breathing technique  Discussed the importance of being in nature and grounding as way to manage anxiety  Symptom management:   Discussion of pt symptoms and management  Pt reports the past 7/8 months he is unable to sleep with is feet touching one another  Pt has been having middle finger numbness with associated cold feeling  Todays treatment plan was modified due to pt expressing feelings of high anxiety due to increased work-related stress  Plan to follow up with the following at next session:  Calendar (fatque levels, emotional responses, self-care)   Note-taking and recall   Follow up on anxiety and stress management               Neuro Re-Ed               Therapeutic Exercise               Manual          Self Care           Assessment: Tolerated treatment well  Pt making progress towards incorporating anxiety management techniques into his routine when needed  Follow up with pt regarding use of calendar for fatigue levels, emotional responses, and self-care  Patient would benefit from continued skilled OT  Plan: Continued skilled OT per POC          INTERVENTION COMMENTS:  Diagnosis: History of concussion [Z87 820]  Precautions:   Insurance: Payor: Evonne Farr / Plan: Evonne Farr PPO / Product Type: PPO /   Visits: 6  PN due 6/20/23

## 2023-06-15 NOTE — PROGRESS NOTES
"Daily Note     Today's date: 6/15/2023  Patient name: Avril Ferro  : 1970  MRN: [id number]  Referring provider: Brenden Villalta DO  Dx:   Encounter Diagnosis     ICD-10-CM    1  History of concussion  Z87 820       2  Imbalance  R26 89       3  Cervicogenic headache  G44 86           Start Time: 1550  Stop Time: 1630  Total time in clinic (min): 40 minutes    Subjective: Pt having more anxiety today due to personal things  Knee hip feel good  Back a little more sore than usual  Had some imbalance on his walk, more lateral movement  Head was straight on and it was well lit area  Objective: See treatment diary below      Assessment:  Challenged balance with agility ladder  Movement backward stepping into ladder vs not in ladder seems to give him more imbalance more a dizziness sensation vs off balance  On offset foam beam crossover step seems to give pt more stability vs lateral movement most likely more from general imbalance vs knee instability  Patient would benefit from continued PT      Plan: Continue per plan of care  Cervical management  Balance       Precautions: cluster HA, PTSD     * HEP: give NV please      Manuals 5/30 6/6 6/8 6/13 6/15        SOR VR AF AF AF         Manual cervical traction VR AF AF AF         PA mobs (C3-C5) VR            Unilateral PA pressure L side VR            Neuro Re-Ed             Walking over hurdles w/ foam between             Tandem amb NV            Walking on foam w/ cone taps             Walking over hidden objects             HT amb  20ft x4, x2 bwd Foam beam 8 laps          Foam  Mod tandem EC 30\" x1 ea Cone  cog task x4 Foam tandem cone  4 laps         Agility ladder     Fwd 2 laps            Ther Ex             Pt education HEP, POC, findings, adding R hip/knee dx            UT stretch NV            LS stretch NV            Cervical rotation SNAGs NV x5 ea 5'           Cervical ext SNAGs NV            Lumbar flexion  x8           Lumbar side " bend  x8 R           Aerobic   lvl 10 6min RPE 5-6/10 Lvl 6 recumbent RPE 4/10 5min         Sled push   10plates + 2 14HG 21PT 2x2 10 plates + 13TS 12SN x4, x2         Ther Activity                                       Gait Training                                       Modalities

## 2023-06-16 ENCOUNTER — EVALUATION (OUTPATIENT)
Dept: PHYSICAL THERAPY | Facility: CLINIC | Age: 53
End: 2023-06-16
Payer: COMMERCIAL

## 2023-06-16 DIAGNOSIS — M76.31 ILIOTIBIAL BAND SYNDROME, RIGHT LEG: Primary | ICD-10-CM

## 2023-06-16 PROCEDURE — 97161 PT EVAL LOW COMPLEX 20 MIN: CPT | Performed by: PHYSICAL THERAPIST

## 2023-06-16 PROCEDURE — 97140 MANUAL THERAPY 1/> REGIONS: CPT | Performed by: PHYSICAL THERAPIST

## 2023-06-16 PROCEDURE — 97110 THERAPEUTIC EXERCISES: CPT | Performed by: PHYSICAL THERAPIST

## 2023-06-16 NOTE — PROGRESS NOTES
PT Evaluation     Today's date: 2023  Patient name: Tha Salgado  : 1970  MRN: [id number]  Referring provider: Humaira Buchanan DO  Dx:   Encounter Diagnosis     ICD-10-CM    1  Iliotibial band syndrome, right leg  M76 31                      Assessment  Assessment details: Tha Salgado is a 47 y o  male presenting to outpatient physical therapy at [place] [id number] with complaints of R posterior hip, lateral thigh, knee and leg pain  He presents with decreased R ITB flexibility, poor R LE balance, poor R patellar mobility, decreased tolerance to activity and decreased functional mobility due to Iliotibial band syndrome, right leg (primary encounter diagnosis)  He would benefit from skilled PT services in order to address these deficits and reach maximum level of function  Thank you for the referral!  Impairments: abnormal or restricted ROM, activity intolerance, impaired balance, impaired physical strength, lacks appropriate home exercise program and pain with function  Barriers to therapy: None  Understanding of Dx/Px/POC: good  Goals  ST  Independent with HEP in 2 weeks  2  Increase R ITB flexibility to WNL in 3 weeks     LT  Achieve FOTO score of 54/100 in 8 weeks   2  Able to descend steps and hills without R LE pain in 8 weeks  3    Good R patellar mobility in 6 weeks    Plan  Patient would benefit from: skilled PT and PT eval  Planned modality interventions: cryotherapy, TENS and thermotherapy: hydrocollator packs  Planned therapy interventions: abdominal trunk stabilization, ADL retraining, balance/weight bearing training, body mechanics training, flexibility, functional ROM exercises, home exercise program, joint mobilization, manual therapy, neuromuscular re-education, postural training, strengthening, stretching, therapeutic activities and therapeutic exercise  Frequency: 2x week  Duration in weeks: 8  Treatment plan discussed with: patient        Subjective Evaluation    History of Present Illness  Mechanism of injury: Pt reports having R posterior hip, lateral thigh, knee and lateral leg pain since about 30 years ago that has recently worsened  Last R LE treatment was 26 years ago  He has hx of a concussion that still limits him  Pt has anxiety  On disability for 1 month from desk work  Unable to walk down gradual hills or down steps due to pain  Sleeping poorly due to pain  Pt is obese  Recurrent probem    Quality of life: fair    Pain  Current pain ratin  At best pain ratin  At worst pain rating: 10  Quality: tight, dull ache and sharp  Aggravating factors: walking and stair climbing  Progression: worsening    Social Support  Steps to enter house: yes  Stairs in house: yes   Lives in: multiple-level home  Lives with: significant other and young children      Diagnostic Tests  No diagnostic tests performed  Treatments  Treatments tried: accupuncture  Patient Goals  Patient goals for therapy: independence with ADLs/IADLs, return to sport/leisure activities, increased motion, decreased pain, return to work and improved balance          Objective     Palpation     Right   Hypertonic in the TFL  Tenderness of the TFL  Tenderness     Right Hip   No tenderness in the greater trochanter       Active Range of Motion   Left Hip   Normal active range of motion    Right Hip   Normal active range of motion  Left Knee   Normal active range of motion    Right Knee   Normal active range of motion    Mobility   Patellar Mobility:     Right Knee   Hypomobile: medial, lateral, superior and inferior     Strength/Myotome Testing     Left Hip   Normal muscle strength    Right Hip   Normal muscle strength    Left Knee   Normal strength    Right Knee   Normal strength    Ambulation     Ambulation: Level Surfaces   Ambulation with assistive device: independent    Ambulation: Stairs   Ascend stairs: independent  Pattern: reciprocal  Railings: one rail  Descend "stairs: independent  Pattern: non-reciprocal  Railings: one rail  Curbs: independent    Observational Gait   Gait: within functional limits         POC EXPIRES On:  8/11/23  PRECAUTIONS:  None  CO-MORBIDITES:  Concussion  PERSONAL FACTORS:  None      Manuals HEP 6/16           TFM R distal ITB supine  8'           Patellar mobs R  4'                                     Neuro Re-Ed                                                                                                Ther Ex    Supine strap R ITB stretch 6/16 20\" 5           Supine LTR to R only 6/16 10\" 10           Bike  NV           Cross leg stretch to L in supine  NV                                                               Ther Activity                              Gait Training                              Modalities                                        "

## 2023-06-19 ENCOUNTER — OFFICE VISIT (OUTPATIENT)
Dept: SPEECH THERAPY | Facility: CLINIC | Age: 53
End: 2023-06-19
Payer: COMMERCIAL

## 2023-06-19 DIAGNOSIS — Z87.820 HISTORY OF CONCUSSION: Primary | ICD-10-CM

## 2023-06-19 DIAGNOSIS — R41.841 COGNITIVE COMMUNICATION DEFICIT: ICD-10-CM

## 2023-06-19 DIAGNOSIS — R41.3 MEMORY PROBLEM: ICD-10-CM

## 2023-06-19 PROCEDURE — 92507 TX SP LANG VOICE COMM INDIV: CPT

## 2023-06-19 NOTE — PROGRESS NOTES
"Daily Speech Treatment Note    Today's date: 2023  Patient’s name: Jeremie Duron  : 1970  MRN: [id number]  Safety measures:   Referring provider: Jose Carlos Ahmadi DO    Encounter Diagnosis     ICD-10-CM    1  History of concussion  Z87 820       2  Memory problem  R41 3       3  Cognitive communication deficit  R41  841           Visit trackin    Subjective/Behavioral:  - Pleasant/Cooperative      Patient noting \"few minutes\" of extreme word retrieval difficulty yesturday  Patient is resolved now  ? TIA  Advised patient and gf to call Dr  Today to inform them of this occurrence  Recent MRI - indicated \"normal\" function  Objective/Assessment:  Completed structured activities with patient to target goals below  Results as stated below  Provided list of apps, ideas, workbooks    work on word retrieval, short term recall and start problem solving tasks    Short-term goals:       Complete executive function task list   : Provided list  Patient circled most of tasks that he has difficulty with with high priorities being:      Complete falls, risk, harm assessment  23: Completed       Patient will be educated on word finding strategies (i e , circumlocution) for improved generative naming and verbal expression skills  6/15: Provided word retrieval strategies, expressed understanding      Patient will complete complex auditory attention processing tasks (e g , sentence unscramble, ranking numbers/words, etc ) in increased distractible environment to improve working memory with 80% accuracy      Patient will facilitate planning by completing thought organization tasks (e g , sequencing, deduction puzzles, etc ) with 80% accuracy to facilitate increased executive functioning, working memory, problem solving, and processing skills  : Completed organizational tasks given information: 100% easier /scheduling tasks and higher level tasks with min cues- 100% accuracy   Provided more " for homework      Patient will complete auditory immediate and short term memory tasks to 80% accuracy to facilitate increased ability to retell narratives and recall information within functional living environment      To target mental manipulation and working memory, patient will participate in word finding activity (i e , anagrams) with 80% accuracy  6/15: Completed anagrams with 80% accuracy  Provided packet for home  Also, started work with opposites and synonyms, completed with increased time - 80% accuracy      Patient will answer questions regarding story read aloud in quiet and distractible environment with 80% accuracy to facilitate improved auditory comprehension and recall      Patient will complete reading comprehension tasks (e g , answering questions, following complex written directions, etc ) to 80% accuracy to facilitate carryover of comprehension of functional reading materials      Patient will demonstrate divided attention by responding to multiple tasks or details within tasks at the same time with min cues in a distracting environment with 80% accuracy      Patient will complete semi complex & complex tasks of increasing difficulty with multiple steps with fading cues to increase organization and accuracy with 80% accuracy      Patient will express understanding of ways to maximize attention/focus, problem solving and recall in work place tasks with independence  6/6: Educated on ways to maximize attention and focus, provided handout  Patient expressed understanding  ? "[employer] indira  6/19: Provided lists of apps, workbooks, games to maximize attention/focus, word retrieval           Patient will be educated on the use of internal and external memory aids and compensatory strategies to maximize attention/focus with 80% accuracy to facilitate increased recall of routine, personal information, and recent events    6/15: Provided information on helpful indira for anxiety, insight timer - body scan relaxation, sleep and timer meditation assistance         Long Term     Patient will complete cognitive-linguistic therapy that addresses patient's specific deficits in processing speed, short-term working memory, attention to detail, monitoring, sequencing, and organization skills, with instruction, to alleviate effects of executive functioning disorder deficits by discharge      Patient will complete higher-level expressive language tasks (e g , word definitions, idioms, synonym/antonyms, etc) with 80% accuracy to improve functional communication skills by discharge       Patient will improve ability to facilitate cognitive function and communication skills including use of compensatory strategies in a variety of functional living tasks to improve quality of like and to maximize level of independence                   Plan:  -Continue with current plan of care  Leotis Sieving, unable to do taxes,  recall, anxiety/stress, coping mechanisms, workplace tasks list and ways to maximize cognition, executive function list, word retrieval tasks, executive function tasks of increased difficulty in increased distractible environments, memory tasks with increased distraction, ?  Melodic intonation therapy

## 2023-06-20 ENCOUNTER — OFFICE VISIT (OUTPATIENT)
Dept: OCCUPATIONAL THERAPY | Facility: CLINIC | Age: 53
End: 2023-06-20
Payer: COMMERCIAL

## 2023-06-20 ENCOUNTER — OFFICE VISIT (OUTPATIENT)
Dept: PHYSICAL THERAPY | Facility: CLINIC | Age: 53
End: 2023-06-20
Payer: COMMERCIAL

## 2023-06-20 DIAGNOSIS — R41.3 MEMORY PROBLEM: ICD-10-CM

## 2023-06-20 DIAGNOSIS — R26.89 IMBALANCE: ICD-10-CM

## 2023-06-20 DIAGNOSIS — F41.9 ANXIETY AND DEPRESSION: ICD-10-CM

## 2023-06-20 DIAGNOSIS — F43.10 PTSD (POST-TRAUMATIC STRESS DISORDER): ICD-10-CM

## 2023-06-20 DIAGNOSIS — F32.A ANXIETY AND DEPRESSION: ICD-10-CM

## 2023-06-20 DIAGNOSIS — Z87.820 HISTORY OF CONCUSSION: Primary | ICD-10-CM

## 2023-06-20 DIAGNOSIS — G44.86 CERVICOGENIC HEADACHE: ICD-10-CM

## 2023-06-20 PROCEDURE — 97530 THERAPEUTIC ACTIVITIES: CPT

## 2023-06-20 PROCEDURE — 97140 MANUAL THERAPY 1/> REGIONS: CPT

## 2023-06-20 PROCEDURE — 97110 THERAPEUTIC EXERCISES: CPT

## 2023-06-20 NOTE — PROGRESS NOTES
"Daily Note     Today's date: 2023  Patient name: Ni Pino  : 1970  MRN: [id number]  Referring provider: Marilou Earl DO  Dx:   Encounter Diagnosis     ICD-10-CM    1  History of concussion  Z87 820       2  Imbalance  R26 89       3  Cervicogenic headache  G44 86           Start Time: 1415  Stop Time: 1449  Total time in clinic (min): 34 minutes    Subjective: Pt having a more anxious day, has not had feelings like this in a while  Felt good over the weekend  No balance episodes  Headache and neck pain more prominent today  L Shoulder area also bothering him  Objective: See treatment diary below      Assessment: Deferred balance work as patient reported good balance, still having trouble with L LE though from orthopedic standpoint  Good response in cervical and upper trap manual work with improving soft tissue mobility and ROM  Pt reports improvement in cervical pain as well as shoulder pain  Followed manuals with cervical SNAGs and stretching with good response  Patient would benefit from continued PT      Plan: Continue per plan of care  Cervical management  Balance       Precautions: cluster HA, PTSD     * HEP: give NV please      Manuals 5/30 6/6 6/8 6/13 6/15 6/20       SOR VR AF AF AF  AF 6min       Manual cervical traction VR AF AF AF  AF       PA mobs (C3-C5) VR            Unilateral PA pressure L side VR            Periscapular TrP      AF 4min total L scap       Neuro Re-Ed             Walking over hurdles w/ foam between             Tandem amb NV            Walking on foam w/ cone taps             Walking over hidden objects             HT amb  20ft x4, x2 bwd Foam beam 8 laps          Foam  Mod tandem EC 30\" x1 ea Cone  cog task x4 Foam tandem cone  4 laps         Agility ladder     Fwd 2 laps            Ther Ex             Pt education HEP, POC, findings, adding R hip/knee dx            UT stretch NV            LS stretch NV     scap hold 20\" x3       Cervical " rotation SNAGs NV x5 ea 5'    x8 ea 5'       Scap protraction/retraction      x8 ea       Thoracic rot      x5 ea + x5 t/s side bend       Cervical ext SNAGs NV     x5 5'       Lumbar flexion  x8           Lumbar side bend  x8 R           Aerobic   lvl 10 6min RPE 5-6/10 Lvl 6 recumbent RPE 4/10 5min         Sled push   10plates + 2 95GJ 61DW 2x2 10 plates + 49SQ 96PK x4, x2         Ther Activity                                       Gait Training                                       Modalities
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
## 2023-06-21 ENCOUNTER — OFFICE VISIT (OUTPATIENT)
Dept: PHYSICAL THERAPY | Facility: CLINIC | Age: 53
End: 2023-06-21
Payer: COMMERCIAL

## 2023-06-21 DIAGNOSIS — M76.31 ILIOTIBIAL BAND SYNDROME, RIGHT LEG: Primary | ICD-10-CM

## 2023-06-21 PROCEDURE — 97110 THERAPEUTIC EXERCISES: CPT

## 2023-06-21 PROCEDURE — 97140 MANUAL THERAPY 1/> REGIONS: CPT

## 2023-06-21 PROCEDURE — 97112 NEUROMUSCULAR REEDUCATION: CPT

## 2023-06-21 NOTE — PROGRESS NOTES
"Daily Note     Today's date: 2023  Patient name: Tamir Redmond  : 1970  MRN: [id number]  Referring provider: Bev Martinez DO  Dx:   Encounter Diagnosis     ICD-10-CM    1  Iliotibial band syndrome, right leg  M76 31                      Subjective: Pt states he is doing all his HEP  Objective: See treatment diary below  Survey Monkey given (_____) and FOTO given (_____)      Assessment: Pt presented to outpatient physical therapy at [place] [address] with complaints of R posterior hip, lateral thigh, knee and leg pain  He presents with decreased R ITB flexibility, poor R LE balance, poor R patellar mobility, decreased tolerance to activity and decreased functional mobility due to Iliotibial band syndrome, right leg (primary encounter diagnosis)  He would benefit from skilled PT services in order to address these deficits and reach maximum level of function        Pt was challenged with s/l ABD , however, pt was able to accomplish task with ms fatigue and soreness post      Plan: Continue plan of care     POC EXPIRES On:  23  PRECAUTIONS:  None  CO-MORBIDITES:  Concussion  PERSONAL FACTORS:  None      Manuals HEP           TFM R distal ITB supine  8' 8'          Patellar mobs R  4' rom 2'                                    Neuro Re-Ed     Bridges   5\" 20          S/L abduction R   20                                                                           Ther Ex    Bike  NV L1 6'          Supine strap R ITB stretch  20\" 5 20\" 5          Supine LTR to R only  10\" 10 10\" 10          Cross leg stretch to L in supine  NV 20\" 5                                                              Ther Activity                              Gait Training                              Modalities                                        "

## 2023-06-22 ENCOUNTER — OFFICE VISIT (OUTPATIENT)
Dept: OCCUPATIONAL THERAPY | Facility: CLINIC | Age: 53
End: 2023-06-22
Payer: COMMERCIAL

## 2023-06-22 ENCOUNTER — OFFICE VISIT (OUTPATIENT)
Dept: PHYSICAL THERAPY | Facility: CLINIC | Age: 53
End: 2023-06-22
Payer: COMMERCIAL

## 2023-06-22 DIAGNOSIS — R26.89 IMBALANCE: ICD-10-CM

## 2023-06-22 DIAGNOSIS — F41.9 ANXIETY AND DEPRESSION: ICD-10-CM

## 2023-06-22 DIAGNOSIS — F43.10 PTSD (POST-TRAUMATIC STRESS DISORDER): ICD-10-CM

## 2023-06-22 DIAGNOSIS — Z87.820 HISTORY OF CONCUSSION: Primary | ICD-10-CM

## 2023-06-22 DIAGNOSIS — G44.86 CERVICOGENIC HEADACHE: ICD-10-CM

## 2023-06-22 DIAGNOSIS — F32.A ANXIETY AND DEPRESSION: ICD-10-CM

## 2023-06-22 DIAGNOSIS — R41.3 MEMORY PROBLEM: ICD-10-CM

## 2023-06-22 PROCEDURE — 97110 THERAPEUTIC EXERCISES: CPT

## 2023-06-22 PROCEDURE — 97140 MANUAL THERAPY 1/> REGIONS: CPT

## 2023-06-22 PROCEDURE — 97530 THERAPEUTIC ACTIVITIES: CPT

## 2023-06-22 NOTE — PROGRESS NOTES
"  Occupational Therapy Daily Note:    Today's date: 2023  Patient name: Paul Tran  : 1970  MRN: [id number]  Referring provider: Enma Linda DO   Dx:   Encounter Diagnoses   Name Primary? • History of concussion Yes   • Anxiety and depression    • Memory problem    • PTSD (post-traumatic stress disorder)      Start Time: 4:03p  End Time: 4:45p    Subjective: \"I am having a pretty good day  \"    Objective: Pt engaged in skilled OT treatment session with focus on fxnl cognition, short term memory, healthy coping education and leisure pursuits to increase engagement, endurance, tolerance, and independence with daily ADL and IADL tasks  CPT Code Minutes                                           Task Details        Therapeutic Activity  Pt engaged in a series of activities focused on anxiety management and memory to improve his overall wellbeing  Pt reported he was experiencing a decrease of stress and anxiety since prior session  Pt reports plans are in place to assist him with handling work disability issues, which has been a factor in elevating his stress and anxiety  Anxiety Management:   Pt was able to independently identify 6 anxiety management strategies to assist him when he has an increase in stress or anxiety  Discussed how he can apply stategies to situations where it is needed, to increase the consistency of use  Pt placed written list into his notebook  Check in next session  Calender:   Continued discussion of utilizing calendar to track decreased concentration/motivation when trying to engage in a task, fatigue levels, emotional responses, and self-care activities to improve anxiety management and work-life balance  Mantra:   Continued discussion of having a mantra to recite when his anxiety/stress is increased  Pt created visual cue with mantra and placed on his notebook  Memory:   Pt was verbally read a four paragraph story and took notes   Pt was then able to " recall verbally read story, without notes; able to recall 95% of information  Pt did not check his notes after each paragraph or at the end of the story  Pt reminded to ask for clarification to fix notes when needed  Next session to check accuracy of notes taken  Neuro Re-Ed               Therapeutic Exercise               Homework   6/22/23  Pt to utilize calendar to note the following through the week:   Decreased concentration/motivation when trying to engage in a task   fatigue levels   Emotional responses   Self-care activities     6/8/2023  Continue to add meditation into weekly schedule 3-4x/week           Self Care           Assessment: Tolerated treatment well  Pt making progress towards incorporating anxiety management techniques into his routine when needed  Follow up with pt regarding use of calendar for fatigue levels, emotional responses, and self-care  Patient would benefit from continued skilled OT  Plan: Continued skilled OT per POC          INTERVENTION COMMENTS:  Diagnosis: History of concussion [Z87 820]  Precautions:   Insurance: Payor: Sue Duke / Plan: Sue Duke PPO / Product Type: PPO /   Visits: 8  PN due 6/20/23

## 2023-06-22 NOTE — PROGRESS NOTES
"Daily Note     Today's date: 2023  Patient name: Rodrigo Meyer  : 1970  MRN: [id number]  Referring provider: Hector Sagastume DO  Dx:   Encounter Diagnosis     ICD-10-CM    1  History of concussion  Z87 820       2  Imbalance  R26 89       3  Cervicogenic headache  G44 86           Start Time: 417  Stop Time:   Total time in clinic (min): 38 minutes    Subjective: Good balance  Headaches are still present, but better since starting therapy  Objective: See treatment diary below      Assessment:  Good response to cervical manuals with cavitations with self stretching post  Reproduction of headache at first with reduction after sustained holds  Pt shows good form with inclusion of towel resistance through extension and flexion  Good form with SNAGs  Instructed patient on self soft tissue work to toleration, reduce if signs of overworking area - pt in agreement  Patient would benefit from continued PT to improve cervical pain and headaches  Plan: Continue per plan of care  Cervical management       Precautions: cluster HA, PTSD     * HEP: give NV please      Manuals 5/30 6/6 6/8 6/13 6/15 6/20 6/22      SOR VR AF AF AF  AF 6min AF 6min      Manual cervical traction VR AF AF AF  AF AF      PA mobs (C3-C5) VR            Unilateral PA pressure L side VR            Periscapular TrP      AF 4min total L scap AF 5min total L scap      Neuro Re-Ed             Walking over hurdles w/ foam between             Tandem amb NV            Walking on foam w/ cone taps             Walking over hidden objects             HT amb  20ft x4, x2 bwd Foam beam 8 laps          Foam  Mod tandem EC 30\" x1 ea Cone  cog task x4 Foam tandem cone  4 laps         Agility ladder     Fwd 2 laps            Ther Ex             Pt education HEP, POC, findings, adding R hip/knee dx            UT stretch NV            LS stretch NV     scap hold 20\" x3 Scap hold 30\" x3      Cervical rotation SNAGs NV x5 ea 5'    x8 " ea 5' x8 5' ea      Scap protraction/retraction      x8 ea x8      Extension/flexion cervical       Towel resist x20 ea      Thoracic rot      x5 ea + x5 t/s side bend x5 ea      Cervical ext SNAGs NV     x5 5'       Lumbar flexion  x8           Lumbar side bend  x8 R           Aerobic   lvl 10 6min RPE 5-6/10 Lvl 6 recumbent RPE 4/10 5min         Sled push   10plates + 2 13CT 46EI 2x2 10 plates + 32IP 24KM x4, x2         Ther Activity                                       Gait Training                                       Modalities

## 2023-06-23 ENCOUNTER — OFFICE VISIT (OUTPATIENT)
Dept: SPEECH THERAPY | Facility: CLINIC | Age: 53
End: 2023-06-23
Payer: COMMERCIAL

## 2023-06-23 DIAGNOSIS — R41.841 COGNITIVE COMMUNICATION DEFICIT: ICD-10-CM

## 2023-06-23 DIAGNOSIS — Z87.820 HISTORY OF CONCUSSION: Primary | ICD-10-CM

## 2023-06-23 DIAGNOSIS — R41.3 MEMORY PROBLEM: ICD-10-CM

## 2023-06-23 PROCEDURE — 92507 TX SP LANG VOICE COMM INDIV: CPT

## 2023-06-23 NOTE — PROGRESS NOTES
Daily Speech Treatment Note    Today's date: 2023  Patient’s name: Paul Tran  : 1970  MRN: [id number]  Safety measures:   Referring provider: DO Shazia Sutton Diagnosis     ICD-10-CM    1  History of concussion  Z87 820       2  Memory problem  R41 3       3  Cognitive communication deficit  R41  841           Visit trackin    Subjective/Behavioral:  - Pleasant/Cooperative              Objective/Assessment:  Completed structured activities with patient to target goals below  Results as stated below  work on word retrieval, short term recall and start problem solving tasks        Short-term goals:       Complete executive function task list   : Provided list  Patient circled most of tasks that he has difficulty with with high priorities being:      Complete falls, risk, harm assessment  23: Completed       Patient will be educated on word finding strategies (i e , circumlocution) for improved generative naming and verbal expression skills  6/15: Provided word retrieval strategies, expressed understanding      Patient will complete complex auditory attention processing tasks (e g , sentence unscramble, ranking numbers/words, etc ) in increased distractible environment to improve working memory with 80% accuracy  : Working memory tasks: word order 3 words: 100%, 4 words increased time and occasional rep 100% Able to also complete reverse order at 3 and 4 words, immediate recall of 1st column answer after completing distracting column/activity: 63% activity, increased to 100% with minimal cue  Patient with difficulty / frustrated with this task, cued for him to use stress reduction techniques      Patient will facilitate planning by completing thought organization tasks (e g , sequencing, deduction puzzles, etc ) with 80% accuracy to facilitate increased executive functioning, working memory, problem solving, and processing skills    : Completed organizational tasks given information: 100% easier /scheduling tasks and higher level tasks with min cues- 100% accuracy  Provided more for homework      Patient will complete auditory immediate and short term memory tasks to 80% accuracy to facilitate increased ability to retell narratives and recall information within functional living environment      To target mental manipulation and working memory, patient will participate in word finding activity (i e , anagrams) with 80% accuracy  6/15: Completed anagrams with 80% accuracy  Provided packet for home  Also, started work with opposites and synonyms, completed with increased time - 80% accuracy      Patient will answer questions regarding story read aloud in quiet and distractible environment with 80% accuracy to facilitate improved auditory comprehension and recall      Patient will complete reading comprehension tasks (e g , answering questions, following complex written directions, etc ) to 80% accuracy to facilitate carryover of comprehension of functional reading materials      Patient will demonstrate divided attention by responding to multiple tasks or details within tasks at the same time with min cues in a distracting environment with 80% accuracy      Patient will complete semi complex & complex tasks of increasing difficulty with multiple steps with fading cues to increase organization and accuracy with 80% accuracy      Patient will express understanding of ways to maximize attention/focus, problem solving and recall in work place tasks with independence  6/6: Educated on ways to maximize attention and focus, provided handout  Patient expressed understanding   ? [facility] indira  6/19: Provided lists of apps, workbooks, games to maximize attention/focus, word retrieval           Patient will be educated on the use of internal and external memory aids and compensatory strategies to maximize attention/focus with 80% accuracy to facilitate increased recall of routine, personal information, and recent events  6/15: Provided information on helpful indira for anxiety, insight timer - body scan relaxation, sleep and timer meditation assistance         Long Term     Patient will complete cognitive-linguistic therapy that addresses patient's specific deficits in processing speed, short-term working memory, attention to detail, monitoring, sequencing, and organization skills, with instruction, to alleviate effects of executive functioning disorder deficits by discharge      Patient will complete higher-level expressive language tasks (e g , word definitions, idioms, synonym/antonyms, etc) with 80% accuracy to improve functional communication skills by discharge       Patient will improve ability to facilitate cognitive function and communication skills including use of compensatory strategies in a variety of functional living tasks to improve quality of like and to maximize level of independence                   Plan:  -Continue with current plan of care  Pat Wakefield, unable to do taxes,  recall, anxiety/stress, coping mechanisms, workplace tasks list and ways to maximize cognition, executive function list, word retrieval tasks, executive function tasks of increased difficulty in increased distractible environments, memory tasks with increased distraction, ?  Melodic intonation therapy

## 2023-06-26 ENCOUNTER — OFFICE VISIT (OUTPATIENT)
Dept: PHYSICAL THERAPY | Facility: CLINIC | Age: 53
End: 2023-06-26
Payer: COMMERCIAL

## 2023-06-26 DIAGNOSIS — R26.89 IMBALANCE: ICD-10-CM

## 2023-06-26 DIAGNOSIS — Z87.820 HISTORY OF CONCUSSION: Primary | ICD-10-CM

## 2023-06-26 DIAGNOSIS — G44.86 CERVICOGENIC HEADACHE: ICD-10-CM

## 2023-06-26 PROCEDURE — 97140 MANUAL THERAPY 1/> REGIONS: CPT

## 2023-06-26 PROCEDURE — 97110 THERAPEUTIC EXERCISES: CPT

## 2023-06-26 NOTE — PROGRESS NOTES
"Daily Note     Today's date: 2023  Patient name: Paul Tran  : 1970  MRN: [id number]  Referring provider: Enma Linda DO  Dx:   Encounter Diagnosis     ICD-10-CM    1  History of concussion  Z87 820       2  Imbalance  R26 89       3  Cervicogenic headache  G44 86           Start Time: 1345  Stop Time: 1423  Total time in clinic (min): 38 minutes    Subjective: No new issues with balance  Neck and headaches have been feeling better  Objective: See treatment diary below      Assessment:  Improving ROM to R side with L sided cervical manuals  Does seem to have limited L rot - potentially from R sided tightness/reduced soft tissue mobility  Referral patterns from cervical spine to head with good response with time demonstrating less headache referral pain  Good response with SNAGs and thoracic ext/L lat stretch  Patient would benefit from continued PT to improve cervical pain and headaches  Plan: Continue per plan of care  Cervical management       Precautions: cluster HA, PTSD     * HEP: give NV please      Manuals 5/30 6/6 6/8 6/13 6/15 6/20 6/22 6/26     SOR VR AF AF AF  AF 6min AF 6min AF 6min     Manual cervical traction VR AF AF AF  AF AF AF     PA mobs (C3-C5) VR            Unilateral PA pressure L side VR            Periscapular TrP      AF 4min total L scap AF 5min total L scap AF 5min total L scap     Neuro Re-Ed             Walking over hurdles w/ foam between             Tandem amb NV            Walking on foam w/ cone taps             Walking over hidden objects             HT amb  20ft x4, x2 bwd Foam beam 8 laps          Foam  Mod tandem EC 30\" x1 ea Cone  cog task x4 Foam tandem cone  4 laps         Agility ladder     Fwd 2 laps            Ther Ex             Pt education HEP, POC, findings, adding R hip/knee dx            UT stretch NV            LS stretch NV     scap hold 20\" x3 Scap hold 30\" x3 scap hold 30\" x3     Cervical rotation SNAGs NV x5 ea 5'    " x8 ea 5' x8 5' ea x8 5' ea     Scap protraction/retraction      x8 ea x8 x8     Extension/flexion cervical       Towel resist x20 ea      Thoracic rot      x5 ea + x5 t/s side bend x5 ea L lat stretch thoracic ext x10     Cervical ext SNAGs NV     x5 5'       Lumbar flexion  x8           Lumbar side bend  x8 R           Aerobic   lvl 10 6min RPE 5-6/10 Lvl 6 recumbent RPE 4/10 5min         Sled push   10plates + 2 70BS 52JU 2x2 10 plates + 64DC 54IJ x4, x2         Ther Activity                                       Gait Training                                       Modalities

## 2023-06-27 ENCOUNTER — OFFICE VISIT (OUTPATIENT)
Dept: OCCUPATIONAL THERAPY | Facility: CLINIC | Age: 53
End: 2023-06-27
Payer: COMMERCIAL

## 2023-06-27 ENCOUNTER — OFFICE VISIT (OUTPATIENT)
Dept: PHYSICAL THERAPY | Facility: CLINIC | Age: 53
End: 2023-06-27
Payer: COMMERCIAL

## 2023-06-27 DIAGNOSIS — R26.89 IMBALANCE: ICD-10-CM

## 2023-06-27 DIAGNOSIS — Z87.820 HISTORY OF CONCUSSION: Primary | ICD-10-CM

## 2023-06-27 DIAGNOSIS — R41.3 MEMORY PROBLEM: ICD-10-CM

## 2023-06-27 DIAGNOSIS — F41.9 ANXIETY AND DEPRESSION: ICD-10-CM

## 2023-06-27 DIAGNOSIS — G44.86 CERVICOGENIC HEADACHE: ICD-10-CM

## 2023-06-27 DIAGNOSIS — F43.10 PTSD (POST-TRAUMATIC STRESS DISORDER): ICD-10-CM

## 2023-06-27 DIAGNOSIS — F32.A ANXIETY AND DEPRESSION: ICD-10-CM

## 2023-06-27 PROCEDURE — 97530 THERAPEUTIC ACTIVITIES: CPT

## 2023-06-27 PROCEDURE — 97110 THERAPEUTIC EXERCISES: CPT

## 2023-06-27 PROCEDURE — 97140 MANUAL THERAPY 1/> REGIONS: CPT

## 2023-06-27 NOTE — PROGRESS NOTES
"  Occupational Therapy Daily Note:    Today's date: 2023  Patient name: Troy Siddiqui  : 1970  MRN: [id number]  Referring provider: Silver Gutierrez, DO   Dx:   Encounter Diagnoses   Name Primary? • History of concussion Yes   • Anxiety and depression    • Memory problem    • PTSD (post-traumatic stress disorder)      Start Time: 3:01p  End Time: 3:45p    Subjective: \"I had a pretty good weekend  \"    Objective: Pt engaged in skilled OT treatment session with focus on fxnl cognition, short term memory, healthy coping education and leisure pursuits to increase engagement, endurance, tolerance, and independence with daily ADL and IADL tasks  CPT Code Minutes                                           Task Details        Therapeutic Activity    Pt engaged in a series of activities focused on memory, note taking, following directions, and discussion of anxiety management  Anxiety Management:   Pt reported decrease in stress and anxiety levels  Pt reports continued plans in place to assist with handling work/disability issues which will help to decrease his stress related to work  Continued discussion of anxiety  management strategies to assist him when he has an increase in stress or anxiety  Pt wrote list of strategies under his mantra on his notebook for easy accessibility  Calender:   Continued discussion about using the printed calendar to track decreased concentration/motivation when trying to engage in a task, fatigue levels, emotional responses, and self-care activities to improve anxiety management and work-life balance  Pt reported he did not do so since last session  Mantra:   Pt reported using his mantra for a reminder to manage his stress/anxiety since less session  Memory:   Pt was able to recall 95% of four paragraph story he was verbally read last session using his notes; notes with G accuracy  Pt instructed to add/fix his notes with the missing information   Next session " have pt use his notes to recall the story  Following Directions:   Pt completed worksheet with 17 written instructions  Pt completed worksheet with 100% accuracy  Pt reported no difficulty with comprehension of the task  Pt with difficulty getting started with complex or more difficult instructions  Neuro Re-Ed               Therapeutic Exercise               Homework   6/22/23  Pt to utilize calendar to note the following through the week:   Decreased concentration/motivation when trying to engage in a task   fatigue levels   Emotional responses   Self-care activities   UPDATE: Pt did not complete since last session  6/8/2023  Continue to add meditation into weekly schedule 3-4x/week           Self Care           Assessment: Tolerated treatment well  Pt making progress towards incorporating anxiety management techniques into his routine when needed  Follow up with pt regarding use of calendar for fatigue levels, emotional responses, and self-care  Pt would like to start establishing concrete strategies for return to work  Patient would benefit from continued skilled OT  Plan: Continued skilled OT per POC          INTERVENTION COMMENTS:  Diagnosis: History of concussion [Z87 820]  Precautions:   Insurance: Payor: [employer] / Plan: [employer] PPO / Product Type: PPO /   Visits: 9  PN due 6/20/23

## 2023-06-27 NOTE — PROGRESS NOTES
"Daily Note     Today's date: 2023  Patient name: Arnulfo Hall  : 1970  MRN: [id number]  Referring provider: Chris Gilmore DO  Dx:   Encounter Diagnosis     ICD-10-CM    1  History of concussion  Z87 820       2  Imbalance  R26 89       3  Cervicogenic headache  G44 86           Start Time: 1415  Stop Time: 1457  Total time in clinic (min): 42 minutes    Subjective: No new issues with balance  Neck and headaches have been feeling better  Had episode of continuing to go left with body + head movement  Objective: See treatment diary below      Assessment:  Pt progressing with soft tissue mobility and cervical + thoracic ROM  Less compensation of thoracic rot present especially with L cervical rot  Pt reports shoulder pain with thoracic open book, with reduced abduction less pain  Good improvement in ROM with repetition of rotation  Pt notes no headache post session  Patient would benefit from continued PT to improve cervical pain and headaches  Plan: Continue per plan of care  Cervical management       Precautions: cluster HA, PTSD     * HEP: give NV please      Manuals 5/30 6/6 6/8 6/13 6/15 6/20 6/22 6/26 6/27    SOR VR AF AF AF  AF 6min AF 6min AF 6min AF 8min    Manual cervical traction VR AF AF AF  AF AF AF AF    PA mobs (C3-C5) VR        AF    Unilateral PA pressure L side VR            Periscapular TrP      AF 4min total L scap AF 5min total L scap AF 5min total L scap AF 5min L scap    Neuro Re-Ed             Walking over hurdles w/ foam between             Tandem amb NV            Walking on foam w/ cone taps             Walking over hidden objects             HT amb  20ft x4, x2 bwd Foam beam 8 laps          Foam  Mod tandem EC 30\" x1 ea Cone  cog task x4 Foam tandem cone  4 laps         Agility ladder     Fwd 2 laps            Ther Ex             Pt education HEP, POC, findings, adding R hip/knee dx            UT stretch NV            LS stretch NV     scap hold 20\" x3 " "Scap hold 30\" x3 scap hold 30\" x3 Into flexion 20\" x3 ea gentle ROM    Cervical rotation SNAGs NV x5 ea 5'    x8 ea 5' x8 5' ea x8 5' ea     Scap protraction/retraction      x8 ea x8 x8     Extension/flexion cervical       Towel resist x20 ea      Thoracic rot      x5 ea + x5 t/s side bend x5 ea L lat stretch thoracic ext x10 thoracic ext x5    Open book x10 1/2 kneel position on foam    Cervical ext SNAGs NV     x5 5'       Lumbar flexion  x8           Lumbar side bend  x8 R           Aerobic   lvl 10 6min RPE 5-6/10 Lvl 6 recumbent RPE 4/10 5min         Sled push   10plates + 2 68AT 25TN 2x2 10 plates + 24WU 75BQ x4, x2         Ther Activity                                       Gait Training                                       Modalities                                            "

## 2023-06-28 ENCOUNTER — OFFICE VISIT (OUTPATIENT)
Dept: PHYSICAL THERAPY | Facility: CLINIC | Age: 53
End: 2023-06-28
Payer: COMMERCIAL

## 2023-06-28 DIAGNOSIS — M76.31 ILIOTIBIAL BAND SYNDROME, RIGHT LEG: Primary | ICD-10-CM

## 2023-06-28 PROCEDURE — 97140 MANUAL THERAPY 1/> REGIONS: CPT

## 2023-06-28 PROCEDURE — 97112 NEUROMUSCULAR REEDUCATION: CPT

## 2023-06-28 PROCEDURE — 97110 THERAPEUTIC EXERCISES: CPT

## 2023-06-28 NOTE — PROGRESS NOTES
"Daily Note     Today's date: 2023  Patient name: Rafa España  : 1970  MRN: [id number]  Referring provider: Cyndy Baum DO  Dx:   Encounter Diagnosis     ICD-10-CM    1  Iliotibial band syndrome, right leg  M76 31                      Subjective: Pt states he is doing all his HEP  Objective: See treatment diary below  Survey Monkey given (_____) and FOTO given (_____)      Assessment: Pt presented to outpatient physical therapy at [facility] with complaints of R posterior hip, lateral thigh, knee and leg pain  He presents with decreased R ITB flexibility, poor R LE balance, poor R patellar mobility, decreased tolerance to activity and decreased functional mobility due to Iliotibial band syndrome, right leg (primary encounter diagnosis)  He would benefit from skilled PT services in order to address these deficits and reach maximum level of function  Focused extensively during manuals ITband ms release, with great response, increased flexibility       Plan: Continue plan of care     POC EXPIRES On:  23  PRECAUTIONS:  None  CO-MORBIDITES:  Concussion  PERSONAL FACTORS:  None      Manuals HEP          TFM R distal ITB supine  8' 8' 10'         Patellar mobs R  4' rom 2' 5' rom                                   Neuro Re-Ed     Bridges   5\" 20 5\" 20         S/L abduction R   20 20                                                                          Ther Ex    Bike  NV L1 6' L1 6'         Supine strap R ITB stretch  20\" 5 20\" 5 20\" 5 seated         Supine LTR to R only  10\" 10 10\" 10 10\" 10         Cross leg stretch to L in supine  NV 20\" 5 20\" 5                                                             Ther Activity                              Gait Training                              Modalities                                        "

## 2023-06-29 ENCOUNTER — OFFICE VISIT (OUTPATIENT)
Dept: SPEECH THERAPY | Facility: CLINIC | Age: 53
End: 2023-06-29
Payer: COMMERCIAL

## 2023-06-29 ENCOUNTER — OFFICE VISIT (OUTPATIENT)
Dept: OCCUPATIONAL THERAPY | Facility: CLINIC | Age: 53
End: 2023-06-29
Payer: COMMERCIAL

## 2023-06-29 ENCOUNTER — OFFICE VISIT (OUTPATIENT)
Dept: PHYSICAL THERAPY | Facility: CLINIC | Age: 53
End: 2023-06-29
Payer: COMMERCIAL

## 2023-06-29 DIAGNOSIS — R41.3 MEMORY PROBLEM: ICD-10-CM

## 2023-06-29 DIAGNOSIS — Z87.820 HISTORY OF CONCUSSION: Primary | ICD-10-CM

## 2023-06-29 DIAGNOSIS — F41.9 ANXIETY AND DEPRESSION: ICD-10-CM

## 2023-06-29 DIAGNOSIS — R26.89 IMBALANCE: ICD-10-CM

## 2023-06-29 DIAGNOSIS — F32.A ANXIETY AND DEPRESSION: ICD-10-CM

## 2023-06-29 DIAGNOSIS — R41.841 COGNITIVE COMMUNICATION DEFICIT: ICD-10-CM

## 2023-06-29 DIAGNOSIS — F43.10 PTSD (POST-TRAUMATIC STRESS DISORDER): ICD-10-CM

## 2023-06-29 DIAGNOSIS — G44.86 CERVICOGENIC HEADACHE: ICD-10-CM

## 2023-06-29 PROCEDURE — 97112 NEUROMUSCULAR REEDUCATION: CPT

## 2023-06-29 PROCEDURE — 97140 MANUAL THERAPY 1/> REGIONS: CPT

## 2023-06-29 PROCEDURE — 92507 TX SP LANG VOICE COMM INDIV: CPT

## 2023-06-29 PROCEDURE — 97530 THERAPEUTIC ACTIVITIES: CPT

## 2023-06-29 NOTE — PROGRESS NOTES
Daily Speech Treatment Note    Today's date: 2023  Patient’s name: Rhina Lebron  : 1970  MRN: [id number]  Safety measures:   Referring provider: DO Shazia Frazier Diagnosis     ICD-10-CM    1  History of concussion  Z87 820       2  Memory problem  R41 3       3  Cognitive communication deficit  R41  841           Visit trackin    Subjective/Behavioral:  - Pleasant/Cooperative        Objective/Assessment:  Completed structured activities with patient to target goals below  Results as stated below  work on word retrieval, short term recall and start problem solving tasks        Short-term goals:       Complete executive function task list   : Provided list  Patient circled most of tasks that he has difficulty with with high priorities being:      Complete falls, risk, harm assessment  23: Completed       Patient will be educated on word finding strategies (i e , circumlocution) for improved generative naming and verbal expression skills  6/15: Provided word retrieval strategies, expressed understanding      Patient will complete complex auditory attention processing tasks (e g , sentence unscramble, ranking numbers/words, etc ) in increased distractible environment to improve working memory with 80% accuracy  : Working memory tasks: word order 3 words: 100%, 4 words increased time and occasional rep 100% Able to also complete reverse order at 3 and 4 words, immediate recall of 1st column answer after completing distracting column/activity: 63% activity, increased to 100% with minimal cue  Patient with difficulty / frustrated with this task, cued for him to use stress reduction techniques      Patient will facilitate planning by completing thought organization tasks (e g , sequencing, deduction puzzles, etc ) with 80% accuracy to facilitate increased executive functioning, working memory, problem solving, and processing skills    : Completed organizational tasks given information: 100% easier /scheduling tasks and higher level tasks with min cues- 100% accuracy  Provided more for homework      Patient will complete auditory immediate and short term memory tasks to 80% accuracy to facilitate increased ability to retell narratives and recall information within functional living environment      To target mental manipulation and working memory, patient will participate in word finding activity (i e , anagrams) with 80% accuracy  6/15: Completed anagrams with 80% accuracy  Provided packet for home  Also, started work with opposites and synonyms, completed with increased time - 80% accuracy      Patient will answer questions regarding story read aloud in quiet and distractible environment with 80% accuracy to facilitate improved auditory comprehension and recall      Patient will complete reading comprehension tasks (e g , answering questions, following complex written directions, etc ) to 80% accuracy to facilitate carryover of comprehension of functional reading materials      Patient will demonstrate divided attention by responding to multiple tasks or details within tasks at the same time with min cues in a distracting environment with 80% accuracy      Patient will complete semi complex & complex tasks of increasing difficulty with multiple steps with fading cues to increase organization and accuracy with 80% accuracy      Patient will express understanding of ways to maximize attention/focus, problem solving and recall in work place tasks with independence  6/6: Educated on ways to maximize attention and focus, provided handout  Patient expressed understanding   ? [employer] indira  6/19: Provided lists of apps, workbooks, games to maximize attention/focus, word retrieval           Patient will be educated on the use of internal and external memory aids and compensatory strategies to maximize attention/focus with 80% accuracy to facilitate increased recall of routine, personal information, and recent events  6/15: Provided information on helpful indira for anxiety, insight timer - body scan relaxation, sleep and timer meditation assistance  6/29: Patient very angry, upset, crying initial part of session  Unable to problem solve for redirection, use positive self talk  Ultimately, patient agreed to going for a walk / boxed breathing, calmed down  Then, I put on a body scan meditation for patient for 20 minutes  Patient able to sustain attention/focus and complete breathing throughout this task x 20 minutes  Encouraged patient to utilize these techniques when feeling slightly agitated, tense           Long Term     Patient will complete cognitive-linguistic therapy that addresses patient's specific deficits in processing speed, short-term working memory, attention to detail, monitoring, sequencing, and organization skills, with instruction, to alleviate effects of executive functioning disorder deficits by discharge      Patient will complete higher-level expressive language tasks (e g , word definitions, idioms, synonym/antonyms, etc) with 80% accuracy to improve functional communication skills by discharge       Patient will improve ability to facilitate cognitive function and communication skills including use of compensatory strategies in a variety of functional living tasks to improve quality of like and to maximize level of independence                   Plan:  -Continue with current plan of care  Sandrita Wallace, unable to do taxes,  recall, anxiety/stress, coping mechanisms, workplace tasks list and ways to maximize cognition, executive function list, word retrieval tasks, executive function tasks of increased difficulty in increased distractible environments, memory tasks with increased distraction, ?  IM therapy

## 2023-06-29 NOTE — PROGRESS NOTES
"  Occupational Therapy Daily Note:    Today's date: 2023  Patient name: Elo Wilkins  : 1970  MRN: [id number]  Referring provider: Addie James DO   Dx:   Encounter Diagnoses   Name Primary? • History of concussion Yes   • Memory problem    • Anxiety and depression    • PTSD (post-traumatic stress disorder)        Subjective: \"I spiraled downward this morning  \"    Objective: Pt engaged in skilled OT treatment session with focus on fxnl cognition, short term memory, healthy coping education and leisure pursuits to increase engagement, endurance, tolerance, and independence with daily ADL and IADL tasks  CPT Code Minutes                                           Task Details        Therapeutic Activity  Pt came for outpatient services very upset  Pt saw [facility] provided update to PT and OT  Discussed carry over strategies for anxiety/stress management with ST  Anxiety Management:  Pt engaged in discussion focused on anxiety/stress management  Discussed how pt can implement strategies before hitting his high emotional rage response, through the use of self-care \"check-ins  \" Pt set 3 daily alarms to assess the following: anxiety levels, expectations/tasks for the day, if he needs help, and what strategies he can use if his anxiety is high to improve his emotional regulation/ awareness and decrease rage response  Discussed self-regulation and the importance of not putting too much on himself  Pt reports negative self-talk when not meeting his own expectations  Discussed progress so far as pt is now able to identify his deficits and strategies to use  Pt working on implementing strategies  Mantra:   Pt reported his mantra is not effective during time of extreme anxiety/rage  Looking to add another mantra to use during these times                 Neuro Re-Ed               Therapeutic Exercise               Homework   23  Pt to utilize calendar to note the following through the " week:   Decreased concentration/motivation when trying to engage in a task   fatigue levels   Emotional responses   Self-care activities   UPDATE: Pt did not complete since last session  6/8/2023  Continue to add meditation into weekly schedule 3-4x/week           Self Care           Assessment: Tolerated treatment well  Pt making progress towards incorporating anxiety management techniques into his routine when needed  Follow up with pt to see how the alarms are working for him  Patient would benefit from continued skilled OT  Plan: Continued skilled OT per POC          INTERVENTION COMMENTS:  Diagnosis: History of concussion [Z87 820]  Precautions:   Insurance: Payor: Alida Luna / Plan: Alida Luna PPO / Product Type: PPO /   Visits: 10  PN due 7/18/23

## 2023-06-29 NOTE — PROGRESS NOTES
"PT Re-Evaluation /progress note    Today's date: 2023  Patient name: Polly Schirmer  : 1970  MRN: [id number]  Referring provider: Alejandra Blackburn DO  Dx:   Encounter Diagnosis     ICD-10-CM    1  History of concussion  Z87 820       2  Imbalance  R26 89       3  Cervicogenic headache  G44 86           Start Time: 1500  Stop Time: 1545  Total time in clinic (min): 45 minutes    Assessment  Assessment details: Pt is a 47 y o  male who presents to OP PT for IE following referral for h/o concussion  Pt reports improvement in balance as well as cervical + headache pain  He notes reduction in amount of headaches as well as cervical pain since starting PT  They are rarely occurring now, however, he is still experiecing limited cervical ROM and cervical pain  He is adhering to HEP and working on improving his cervical ROM  FGA does not demonstrate fall risk  He does show more imbalance with L head movement and tends to drift to the R with eyes closed activities  He is progressing toward his goals to reduce his headache pain and frequency and will continue to benefit from skilled PT intervention  Understanding of Dx/Px/POC: good   Prognosis: good    Goals  STGs (to be achieved w/i 2-4 weeks)  1  Will obtain referral for ortho PT for R hip/knee pain  - met    LTGs (to be achieved within 6-8 weeks)   1  Pt will be I with HEP at d/c to promote PT carry-over  2  Pt will meet FOTO predicted score  3  Pt will report improvements in \"head pressure\", no greater than 2/10 1-2x week  - met  4  Pt will report increased confidence in balance abilities  - progressing    In 4 weeks pt will:  1  Improve FGA to 29/30 showing improved balance with head turns  2   Demonstrate improved cervical ROM by at least 25% to assist with functional tasks      Plan  Patient would benefit from: skilled physical therapy  Planned therapy interventions: joint mobilization, therapeutic activities, therapeutic exercise, neuromuscular " re-education and gait training  Frequency: 2x week  Duration in weeks: 8  Treatment plan discussed with: patient (SO)        Subjective Evaluation    History of Present Illness  Mechanism of injury: Pt with history of multiple concussions/ head traumas from various mechanisms, with the most recent being approx 6 years ago, when he had a coughing spell, leaned forward, passed out and fell on his head  Pt reports cont progression of symptoms over the last 5 years, with worsening symptoms over the last 6 months  Symptoms include: imbalance, L hand tremor, numbness in R 3 digit of hand, sensitivity to light, and dull pressure of L frontal and parietal regions of head  Also c/o non-PT related symptoms including confusion, memory and executive function deficits, and increased anxiety  Pt is no longer driving 2/2 symptoms  HA/head pressure is worsened by lack of sleep, working on computer, and hard/new tasks  H/o neck and B shoulder issues including subluxing at shoulders  Balance issues began several years ago and are worsening  Nearly falls at least once a week  Denies neuropathy or LE radiculopathy  Does have R hip and knee issues and has to stop and stretch IT several times during 1 5 mile walks he does on several times per week  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  Objective     Palpation   Left   Hypertonic in the cervical paraspinals, scalenes, sternocleidomastoid, suboccipitals and upper trapezius  Tenderness of the cervical paraspinals, scalenes, sternocleidomastoid, suboccipitals and upper trapezius  Trigger point to scalenes and upper trapezius  Right   Hypertonic in the cervical paraspinals, scalenes, sternocleidomastoid, suboccipitals and upper trapezius  Tenderness of the cervical paraspinals, scalenes, sternocleidomastoid, suboccipitals and upper trapezius  Trigger point to scalenes       Active Range of Motion   Cervical/Thoracic Spine     Normal active range of motion    Tests   Cervical     Left   Positive cervical flexion-rotation test     Neuro Exam:     Oculomotor exam   Oculomotor ROM: WNL  Smooth pursuits: within normal limits  Vertical saccades: hypometric  Horizontal saccades: hypometric   Convergence: normal  Dynamic head: VOR testing: blurriness of target @ 1 Hz    Coordination   Heel to shin: left WNL and right WNL  Finger to nose: left dysmetria and right dysmetria (R>L)  Rapid alternating movements: UE WNL and LE impaired         Cervical Range of Motion     Flexion 50% limited    Extension 50% limited     Left Right   Lateral Flexion     Rotation 50% limited 25% limited       Flowsheet Rows    Flowsheet Row Most Recent Value   Functional Gait Assessment    Gait Level Surface  3   Change in GaiT Speed 3   Gait with horizontal head turns 2   Gait with vertical head turns 3   Gait and pivot tuen  3   Step over obstacle 3   Gait with narrow base of supprt 3   Gait with eyes closed 3   Ambulating backwards 3   Steps 2   Total score  28                      Outcome measure IE 6/29   FGA 28/30 28/30   Kay 50/56                   MCTSIB Number of Seconds    Feet Together, Eyes Open 30 30   Feet Together, Eyes Closed 30 30 R drift   Feet Together, Eyes Open Foam 30 30   Feet Together, Eyes Closed Foam 30 w/ minimal sway 30 R drift mod sway            Precautions: cluster HA, PTSD     * HEP: give NV please      Manuals 5/30 6/6 6/8 6/13 6/15 6/20 6/22 6/26 6/27 6/29   SOR VR AF AF AF  AF 6min AF 6min AF 6min AF 8min AF 8min   Manual cervical traction VR AF AF AF  AF AF AF AF AF   PA mobs (C3-C5) VR        AF AF   Unilateral PA pressure L side VR            Periscapular TrP      AF 4min total L scap AF 5min total L scap AF 5min total L scap AF 5min L scap    Neuro Re-Ed          FGA, mCTSIB   Walking over hurdles w/ foam between             Tandem amb NV            Walking on foam w/ cone taps             Walking over hidden objects             HT amb  20ft x4, x2 bwd "Foam beam 8 laps          Foam  Mod tandem EC 30\" x1 ea Cone  cog task x4 Foam tandem cone  4 laps         Agility ladder     Fwd 2 laps            Ther Ex             Pt education HEP, POC, findings, adding R hip/knee dx            UT stretch NV            LS stretch NV     scap hold 20\" x3 Scap hold 30\" x3 scap hold 30\" x3 Into flexion 20\" x3 ea gentle ROM    Cervical rotation SNAGs NV x5 ea 5'    x8 ea 5' x8 5' ea x8 5' ea     Scap protraction/retraction      x8 ea x8 x8     Extension/flexion cervical       Towel resist x20 ea      Thoracic rot      x5 ea + x5 t/s side bend x5 ea L lat stretch thoracic ext x10 thoracic ext x5    Open book x10 1/2 kneel position on foam    Cervical ext SNAGs NV     x5 5'       Lumbar flexion  x8           Lumbar side bend  x8 R           Aerobic   lvl 10 6min RPE 5-6/10 Lvl 6 recumbent RPE 4/10 5min         Sled push   10plates + 2 37VK 29UB 2x2 10 plates + 71RO 67NX x4, x2         Ther Activity                                       Gait Training                                       Modalities                                            "

## 2023-06-30 ENCOUNTER — OFFICE VISIT (OUTPATIENT)
Dept: PHYSICAL THERAPY | Facility: CLINIC | Age: 53
End: 2023-06-30
Payer: COMMERCIAL

## 2023-06-30 DIAGNOSIS — M76.31 ILIOTIBIAL BAND SYNDROME, RIGHT LEG: Primary | ICD-10-CM

## 2023-06-30 PROCEDURE — 97110 THERAPEUTIC EXERCISES: CPT | Performed by: PHYSICAL THERAPIST

## 2023-06-30 PROCEDURE — 97112 NEUROMUSCULAR REEDUCATION: CPT | Performed by: PHYSICAL THERAPIST

## 2023-06-30 PROCEDURE — 97140 MANUAL THERAPY 1/> REGIONS: CPT | Performed by: PHYSICAL THERAPIST

## 2023-06-30 NOTE — PROGRESS NOTES
"Daily Note     Today's date: 2023  Patient name: Sunita Parada  : 1970  MRN: [id number]  Referring provider: Shady Treadwell DO  Dx:   Encounter Diagnosis     ICD-10-CM    1  Iliotibial band syndrome, right leg  M76 31                      Subjective: Patient reports good tolerance to his LV  He notes his leg is feeling much looser - had good carryover in symptom relief  Objective: See treatment diary below  Survey Monkey given (_____) and FOTO given (_____)      Assessment: Pt presented to outpatient physical therapy at [place] [address] with complaints of R posterior hip, lateral thigh, knee and leg pain  He presents with decreased R ITB flexibility, poor R LE balance, poor R patellar mobility, decreased tolerance to activity and decreased functional mobility due to Iliotibial band syndrome, right leg (primary encounter diagnosis)  He would benefit from skilled PT services in order to address these deficits and reach maximum level of function  Patient had good tolerance to manual treatment - improved patella mobility following  He had good exercise form and recall  He is making excellent progress with PT - noted he feels he can finally take normal strides with walking  Skilled PT continues to be required to improve (R) LE flexibility and strength         Plan: Continue plan of care - progress with WB strengthening as appropriate     POC EXPIRES On:  23  PRECAUTIONS:  None  CO-MORBIDITES:  Concussion  PERSONAL FACTORS:  None      Manuals HEP         TFM R distal ITB supine  8' 8' 10' IASTM  10 min        Patellar mobs R  4' rom 2' 5' rom 5 min                                  Neuro Re-Ed     Bridges   5\" 20 5\" 20         S/L abduction R   20 20                      FWD Lunge             LAT Lunge                                       Ther Ex    Bike  NV L1 6' L1 6' L4  6 min        Supine strap R ITB stretch  20\" 5 20\" 5 20\" 5 seated 20\"x5        Supine LTR to R " "only 6/16 10\" 10 10\" 10 10\" 10 10\"x10        Cross leg stretch to L in supine  NV 20\" 5 20\" 5 20\"x5                                                            Ther Activity                              Gait Training                              Modalities                                        "

## 2023-07-03 ENCOUNTER — OFFICE VISIT (OUTPATIENT)
Dept: PHYSICAL THERAPY | Facility: CLINIC | Age: 53
End: 2023-07-03
Payer: COMMERCIAL

## 2023-07-03 DIAGNOSIS — M76.31 ILIOTIBIAL BAND SYNDROME, RIGHT LEG: Primary | ICD-10-CM

## 2023-07-03 PROCEDURE — 97112 NEUROMUSCULAR REEDUCATION: CPT

## 2023-07-03 PROCEDURE — 97110 THERAPEUTIC EXERCISES: CPT

## 2023-07-03 PROCEDURE — 97140 MANUAL THERAPY 1/> REGIONS: CPT

## 2023-07-03 NOTE — PROGRESS NOTES
Daily Note     Today's date: 7/3/2023  Patient name: Su Fuentes  : 1970  MRN: [id number]  Referring provider: Vasu Perea DO  Dx:   Encounter Diagnosis     ICD-10-CM    1. Iliotibial band syndrome, right leg  M76.31                      Subjective: Patient reports feeling better especially being better on the stationary bike. Objective: See treatment diary below  Survey Monkey given (_____) and FOTO given (_____)      Assessment: Pt presented to outpatient physical therapy at [facility] with complaints of R posterior hip, lateral thigh, knee and leg pain. He presents with decreased R ITB flexibility, poor R LE balance, poor R patellar mobility, decreased tolerance to activity and decreased functional mobility due to Iliotibial band syndrome, right leg (primary encounter diagnosis). He would benefit from skilled PT services in order to address these deficits and reach maximum level of function. Pt continued to feel good after manuals, increased flexibility.      Plan: Continue plan of care - progress with WB strengthening as appropriate     POC EXPIRES On:  23  PRECAUTIONS:  None  CO-MORBIDITES:  Concussion  PERSONAL FACTORS:  None      Manuals HEP  7/3       TFM R distal ITB supine  8' 8' 10' IASTM  10 min 10       Patellar mobs R  4' rom 2' 5' rom 5 min 5 min                                 Neuro Re-Ed     Bridges   5" 20 5" 20  5" 20       S/L abduction R   20 20  20                    FWD Lunge             LAT Lunge                                       Ther Ex    Bike  NV L1 6' L1 6' L4  6 min L4 6'       Supine strap R ITB stretch  20" 5 20" 5 20" 5 seated 20"x5 20" 5       Supine LTR to R only  10" 10 10" 10 10" 10 10"x10 10" 10       Cross leg stretch to L in supine  NV 20" 5 20" 5 20"x5 20" 5                                                           Ther Activity                              Gait Training                              Modalities

## 2023-07-06 ENCOUNTER — OFFICE VISIT (OUTPATIENT)
Facility: CLINIC | Age: 53
End: 2023-07-06
Payer: COMMERCIAL

## 2023-07-06 DIAGNOSIS — Z87.820 HISTORY OF CONCUSSION: Primary | ICD-10-CM

## 2023-07-06 DIAGNOSIS — F41.9 ANXIETY AND DEPRESSION: ICD-10-CM

## 2023-07-06 DIAGNOSIS — R41.3 MEMORY PROBLEM: ICD-10-CM

## 2023-07-06 DIAGNOSIS — F43.10 PTSD (POST-TRAUMATIC STRESS DISORDER): ICD-10-CM

## 2023-07-06 DIAGNOSIS — F32.A ANXIETY AND DEPRESSION: ICD-10-CM

## 2023-07-06 PROCEDURE — 97530 THERAPEUTIC ACTIVITIES: CPT

## 2023-07-06 NOTE — PROGRESS NOTES
Occupational Therapy Daily Note:    Today's date: 2023  Patient name: Marcel Pinto  : 1970  MRN: [id number]  Referring provider: Mike Servin DO   Dx:   Encounter Diagnoses   Name Primary? • History of concussion Yes   • Memory problem    • Anxiety and depression    • PTSD (post-traumatic stress disorder)        Subjective: "I have been up and down since I last saw you."    Objective: Pt engaged in skilled OT treatment session with focus on fxnl cognition, short term memory, healthy coping education and leisure pursuits to increase engagement, endurance, tolerance, and independence with daily ADL and IADL tasks. CPT Code Minutes                                           Task Details        Therapeutic Activity  Pt engaged in session focused on anxiety/stress management techniques, return to work, and leisure engagement. Pt reported having a high emotional day yesterday. Anxiety Management:  Pt engaged in discussion focused on anxiety/stress management. Discussed pts increased awareness of his own emotional responses. Pt reporting G carry over of 3 "check-in" alarms to bring awareness of his emotions/well-being. Pt still working on the consistently of implementing strategies to improve high emotional responses. Pt added to alarms for reminder to fill out his calendar for G carryover of tracking leisure engagement, emotional responses, needing help, and times of decreased motivation/engagement. Continued discussion of self-regulation and the importance being aware of his emotions. Pt has increased ability to identify his deficits. Pt continues to work on implementing strategies. Discussed with pt that he is making progressing by being able to identify deficits. Mantra:   Pt created second visual aid to add to his notebook with a new mantra. Pt reminded of the use of his mantra when he is spiraling.      Return to work:   Pt expressed desire to begin discussion about return to work strategies. Pt asked to bring job description, expectations, and job tasks along with him to his next session. Neuro Re-Ed               Therapeutic Exercise               Homework   6/22/23  Pt to utilize calendar to note the following through the week:   Decreased concentration/motivation when trying to engage in a task   fatigue levels   Emotional responses   Self-care activities   UPDATE: Pt did not complete since last session. 6/8/2023  Continue to add meditation into weekly schedule 3-4x/week           Self Care           Assessment: Tolerated treatment well. Pt making progress towards incorporating anxiety management techniques into his routine when needed. Follow up on use of calendar for anxiety management. Patient would benefit from continued skilled OT. Plan: Continued skilled OT per POC          INTERVENTION COMMENTS:  Diagnosis: History of concussion [Z87.820]  Precautions:   Insurance: Payor: Alfredo Melchor / Plan: Alfredo Melchor PPO / Product Type: PPO /   Visits: 12  PN due 7/18/23

## 2023-07-07 ENCOUNTER — OFFICE VISIT (OUTPATIENT)
Dept: PHYSICAL THERAPY | Facility: CLINIC | Age: 53
End: 2023-07-07
Payer: COMMERCIAL

## 2023-07-07 DIAGNOSIS — M76.31 ILIOTIBIAL BAND SYNDROME, RIGHT LEG: Primary | ICD-10-CM

## 2023-07-07 PROCEDURE — 97110 THERAPEUTIC EXERCISES: CPT

## 2023-07-07 PROCEDURE — 97112 NEUROMUSCULAR REEDUCATION: CPT

## 2023-07-07 PROCEDURE — 97140 MANUAL THERAPY 1/> REGIONS: CPT

## 2023-07-07 NOTE — PROGRESS NOTES
Daily Note     Today's date: 2023  Patient name: Kymberly Chavez  : 1970  MRN: [id number]  Referring provider: Sherry Erazo DO  Dx:   Encounter Diagnosis     ICD-10-CM    1. Iliotibial band syndrome, right leg  M76.31                      Subjective: Patient reports feeling better especially being better on the stationary bike. Objective: See treatment diary below  Survey Monkey given (_____) and FOTO given (_____)      Assessment: Pt presented to outpatient physical therapy at [facility] with complaints of R posterior hip, lateral thigh, knee and leg pain. He presents with decreased R ITB flexibility, poor R LE balance, poor R patellar mobility, decreased tolerance to activity and decreased functional mobility due to Iliotibial band syndrome, right leg (primary encounter diagnosis). He would benefit from skilled PT services in order to address these deficits and reach maximum level of function. Pt continued to feel good after manuals, increased flexibility.      Plan: Continue plan of care - progress with WB strengthening as appropriate  Re-eval  for check-in    POC EXPIRES On:  23  PRECAUTIONS:  None  CO-MORBIDITES:  Concussion  PERSONAL FACTORS:  None      Manuals HEP 6/30 7/3 7/7      TFM R distal ITB supine  IASTM  10 min 10 10      Patellar mobs R  5 min 5 min 5                          Neuro Re-Ed     Bridges   5" 20 5" 20      S/L abduction R   20 20                FWD Lunge          LAT Lunge                              Ther Ex    Bike  L4  6 min L4 6' L5 6'      Supine strap R ITB stretch  20"x5 20" 5 20" 5 no strap      Supine LTR to R only  10"x10 10" 10 10" 10      Cross leg stretch to L in supine  20"x5 20" 5 20" 5                                              Ther Activity                        Gait Training                        Modalities

## 2023-07-10 ENCOUNTER — OFFICE VISIT (OUTPATIENT)
Dept: PHYSICAL THERAPY | Facility: CLINIC | Age: 53
End: 2023-07-10
Payer: COMMERCIAL

## 2023-07-10 ENCOUNTER — OFFICE VISIT (OUTPATIENT)
Facility: CLINIC | Age: 53
End: 2023-07-10
Payer: COMMERCIAL

## 2023-07-10 DIAGNOSIS — R26.89 IMBALANCE: ICD-10-CM

## 2023-07-10 DIAGNOSIS — G44.86 CERVICOGENIC HEADACHE: ICD-10-CM

## 2023-07-10 DIAGNOSIS — Z87.820 HISTORY OF CONCUSSION: Primary | ICD-10-CM

## 2023-07-10 DIAGNOSIS — M76.31 ILIOTIBIAL BAND SYNDROME, RIGHT LEG: Primary | ICD-10-CM

## 2023-07-10 PROCEDURE — 97110 THERAPEUTIC EXERCISES: CPT

## 2023-07-10 PROCEDURE — 97140 MANUAL THERAPY 1/> REGIONS: CPT

## 2023-07-10 NOTE — PROGRESS NOTES
Daily Note     Today's date: 7/10/2023  Patient name: Brett Moya  : 1970  MRN: [id number]  Referring provider: Brandee Winslow DO  Dx:   Encounter Diagnosis     ICD-10-CM    1. History of concussion  Z87.820       2. Imbalance  R26.89       3. Cervicogenic headache  G44.86           Start Time: 1100  Stop Time: 1143  Total time in clinic (min): 43 minutes    Subjective: Pt noting less dizziness and imbalance as well as improving cervical and shoulder ROM. CT Friday for neck. Objective: See treatment diary below      Assessment:  Good repsonse to cervical manual with pt noting cavitation when performing cervical SNAGs and mobility work. Improving thoracic extension and rotation with ability to get UE to wall vs last session, he was not able to do this. Shows good understanding of mobility drills and improving ROM of cervical and thoracic spine. Still demonstrates limited ROM and pain of cervical spine limiting his overall function. Patient would benefit from continued PT      Plan: Continue per plan of care. Continue cervical care.      Precautions: cluster HA, PTSD     * HEP: give NV please      Manuals 5/30 6/6 6/8 6/13 6/15 6/20 6/22 6/26 6/27 6/29 7/10   SOR VR AF AF AF  AF 6min AF 6min AF 6min AF 8min AF 8min AF 8min   Manual cervical traction VR AF AF AF  AF AF AF AF AF AF   PA mobs (C3-C5) VR        AF AF AF   Unilateral PA pressure L side VR             Periscapular TrP      AF 4min total L scap AF 5min total L scap AF 5min total L scap AF 5min L scap     Neuro Re-Ed          FGA, mCTSIB    Walking over hurdles w/ foam between              Tandem amb NV             Walking on foam w/ cone taps              Walking over hidden objects              HT amb  20ft x4, x2 bwd Foam beam 8 laps           Foam  Mod tandem EC 30" x1 ea Cone  cog task x4 Foam tandem cone  4 laps          Agility ladder     Fwd 2 laps             Ther Ex              Pt education HEP, POC, findings, adding R Living Donor Committee Review Note Evaluation Date: 2/8/2019  Committee Review Date: 2/13/2019    Donor being evaluated for: Kidney    Transplant Phase: Evaluation  Transplant Status: Active    Transplant Coordinator: Ivone Higuera  Transplant Surgeon:       Committee Review Members:  Nephrology Thomas Lopez MD   Nutrition Michelle Loja, RD   Pharmacy Joselo Brown, [facility]    - Clinical Lexie Avila, [facility], Waleska Rick, [facility]   Transplant Dayan Dunn LPN, Ivone Higuera RN, Prasanna Tamez MD, Luis Alberto Alexis MD       Transplant Eligibility: Acceptable Mental Health    Committee Review Decision: Needs Re-presentation    Relative Contraindications:     Absolute Contraindications:     Committee Chair Prasanna Tamez MD verbally attested to the committee's decision.    Committee Discussion Details:   Reviewed the CT images and report.  There is no further testing needed in regards to the prominent pancreatic duct notation.  Choice is left kidney   Her Iohexol results are 77 ml/min which meets our criteria for donors greater than age 60.  Patient is approved pending repeat UA.  Due to the urine having small blood on macro on two occasions, recommend repeating the UA with microanalysis.         hip/knee dx             UT stretch NV             LS stretch NV     scap hold 20" x3 Scap hold 30" x3 scap hold 30" x3 Into flexion 20" x3 ea gentle ROM     Cervical rotation SNAGs NV x5 ea 5'    x8 ea 5' x8 5' ea x8 5' ea   x10 5' ea   Scap protraction/retraction      x8 ea x8 x8      Extension/flexion cervical       Towel resist x20 ea    Towel resistance x20   Thoracic rot      x5 ea + x5 t/s side bend x5 ea L lat stretch thoracic ext x10 thoracic ext x5    Open book x10 1/2 kneel position on foam  thoracic ext in standing x8 unilat OH // bar    Seated thoracic ext x10 + rot   Cervical ext SNAGs NV     x5 5'        Lumbar flexion  x8            Lumbar side bend  x8 R            Aerobic   lvl 10 6min RPE 5-6/10 Lvl 6 recumbent RPE 4/10 5min          Sled push   10plates + 2 88II 59GM 2x2 10 plates + 35QZ 02JV x4, x2          Ther Activity                                          Gait Training                                          Modalities

## 2023-07-10 NOTE — PROGRESS NOTES
Daily Note     Today's date: 7/10/2023  Patient name: Nasima Knowles  : 1970  MRN: [id number]  Referring provider: Hany Choe DO  Dx:   Encounter Diagnosis     ICD-10-CM    1. Iliotibial band syndrome, right leg  M76.31                      Subjective: Patient reports he continues to feel better and his knee is moving so much better        Objective: See treatment diary below  Survey Monkey given (_____) and FOTO given (_____)      Assessment: Pt presented to outpatient physical therapy at [facility] with complaints of R posterior hip, lateral thigh, knee and leg pain. He presents with decreased R ITB flexibility, poor R LE balance, poor R patellar mobility, decreased tolerance to activity and decreased functional mobility due to Iliotibial band syndrome, right leg (primary encounter diagnosis). He would benefit from skilled PT services in order to address these deficits and reach maximum level of function. Pt continues to benefit from extensive manual therapy stretching and DTM to improve ITB and patellar mobility. Able to make it much longer on recumbent bike with less discomfort than previous sessions.      Plan: Continue plan of care - progress with WB strengthening as appropriate  Re-eval  for check-in    POC EXPIRES On:  23  PRECAUTIONS:  None  CO-MORBIDITES:  Concussion  PERSONAL FACTORS:  None      Manuals HEP 6/30 7/3 7/7 7/10     TFM R distal ITB supine  IASTM  10 min 10 10 20     Patellar mobs R  5 min 5 min 5 5                         Neuro Re-Ed     Bridges   5" 20 5" 20 5"x20     S/L abduction R   20 20 20               FWD Lunge          LAT Lunge                              Ther Ex    Bike  L4  6 min L4 6' L5 6' L5  10 min     Supine strap R ITB stretch  20"x5 20" 5 20" 5 no strap 20"x5     Supine LTR to R only  10"x10 10" 10 10" 10 10"x10     Cross leg stretch to L in supine  20"x5 20" 5 20" 5 20"x5                                             Ther Activity Gait Training                        Modalities

## 2023-07-12 ENCOUNTER — OFFICE VISIT (OUTPATIENT)
Dept: SPEECH THERAPY | Facility: CLINIC | Age: 53
End: 2023-07-12
Payer: COMMERCIAL

## 2023-07-12 ENCOUNTER — OFFICE VISIT (OUTPATIENT)
Dept: PHYSICAL THERAPY | Facility: CLINIC | Age: 53
End: 2023-07-12
Payer: COMMERCIAL

## 2023-07-12 DIAGNOSIS — R41.841 COGNITIVE COMMUNICATION DEFICIT: ICD-10-CM

## 2023-07-12 DIAGNOSIS — R41.3 MEMORY PROBLEM: ICD-10-CM

## 2023-07-12 DIAGNOSIS — Z87.820 HISTORY OF CONCUSSION: Primary | ICD-10-CM

## 2023-07-12 DIAGNOSIS — M76.31 ILIOTIBIAL BAND SYNDROME, RIGHT LEG: Primary | ICD-10-CM

## 2023-07-12 PROCEDURE — 92507 TX SP LANG VOICE COMM INDIV: CPT

## 2023-07-12 PROCEDURE — 97140 MANUAL THERAPY 1/> REGIONS: CPT | Performed by: PHYSICAL THERAPIST

## 2023-07-12 PROCEDURE — 97110 THERAPEUTIC EXERCISES: CPT | Performed by: PHYSICAL THERAPIST

## 2023-07-12 NOTE — PROGRESS NOTES
Daily Speech Treatment Note    Today's date: 2023  Patient’s name: Su Fuentes  : 1970  MRN: [id number]  Safety measures:   Referring provider: DO Shazia Giraldo Diagnosis     ICD-10-CM    1. History of concussion  Z87.820       2. Memory problem  R41.3       3. Cognitive communication deficit  R41. 841           Visit trackin    Subjective/Behavioral:  - Pleasant/Cooperative        Objective/Assessment:  Completed structured activities with patient to target goals below. Results as stated below. work on word retrieval, short term recall and start problem solving tasks        Short-term goals:       Complete executive function task list.  : Provided list. Patient circled most of tasks that he has difficulty with with high priorities being:      Complete falls, risk, harm assessment. 23: Completed.      Patient will be educated on word finding strategies (i.e., circumlocution) for improved generative naming and verbal expression skills. 6/15: Provided word retrieval strategies, expressed understanding.     Patient will complete complex auditory attention processing tasks (e.g., sentence unscramble, ranking numbers/words, etc.) in increased distractible environment to improve working memory with 80% accuracy. : Working memory tasks: word order 3 words: 100%, 4 words increased time and occasional rep 100% Able to also complete reverse order at 3 and 4 words, immediate recall of 1st column answer after completing distracting column/activity: 63% activity, increased to 100% with minimal cue. Patient with difficulty / frustrated with this task, cued for him to use stress reduction techniques.     Patient will facilitate planning by completing thought organization tasks (e.g., sequencing, deduction puzzles, etc.) with 80% accuracy to facilitate increased executive functioning, working memory, problem solving, and processing skills.   : Completed organizational tasks given information: 100% easier /scheduling tasks and higher level tasks with min cues- 100% accuracy. Provided more for homework.     Patient will complete auditory immediate and short term memory tasks to 80% accuracy to facilitate increased ability to retell narratives and recall information within functional living environment.     To target mental manipulation and working memory, patient will participate in word finding activity (i.e., anagrams) with 80% accuracy. 6/15: Completed anagrams with 80% accuracy. Provided packet for home. Also, started work with opposites and synonyms, completed with increased time - 80% accuracy.     Patient will answer questions regarding story read aloud in quiet and distractible environment with 80% accuracy to facilitate improved auditory comprehension and recall.     Patient will complete reading comprehension tasks (e.g., answering questions, following complex written directions, etc.) to 80% accuracy to facilitate carryover of comprehension of functional reading materials.     Patient will demonstrate divided attention by responding to multiple tasks or details within tasks at the same time with min cues in a distracting environment with 80% accuracy.       Patient will complete semi complex & complex tasks of increasing difficulty with multiple steps with fading cues to increase organization and accuracy with 80% accuracy.     Patient will express understanding of ways to maximize attention/focus, problem solving and recall in work place tasks with independence. 6/6: Educated on ways to maximize attention and focus, provided handout. Patient expressed understanding.  ? [employer] indira. 6/19: Provided lists of apps, workbooks, games to maximize attention/focus, word retrieval.          Patient will be educated on the use of internal and external memory aids and compensatory strategies to maximize attention/focus with 80% accuracy to facilitate increased recall of routine, personal information, and recent events. 6/15: Provided information on helpful indira for anxiety, insight timer - body scan relaxation, sleep and timer meditation assistance. 6/29: Patient very angry, upset, crying initial part of session. Unable to problem solve for redirection, use positive self talk. Ultimately, patient agreed to going for a walk / boxed breathing, calmed down. Then, I put on a body scan meditation for patient for 20 minutes. Patient able to sustain attention/focus and complete breathing throughout this task x 20 minutes. Encouraged patient to utilize these techniques when feeling slightly agitated, tense. 7/12: Provided list of memory strategies to promote attention/focusand recall. Encouraged patient to utilize morning steady routine of meditations, out loud as doing things and to obtain/ start using bullet journal in helping organize information and duties in a time efficient and organized manner.        Long Term     Patient will complete cognitive-linguistic therapy that addresses patient's specific deficits in processing speed, short-term working memory, attention to detail, monitoring, sequencing, and organization skills, with instruction, to alleviate effects of executive functioning disorder deficits by discharge.     Patient will complete higher-level expressive language tasks (e.g., word definitions, idioms, synonym/antonyms, etc) with 80% accuracy to improve functional communication skills by discharge.      Patient will improve ability to facilitate cognitive function and communication skills including use of compensatory strategies in a variety of functional living tasks to improve quality of like and to maximize level of independence.                  Plan:  -Continue with current plan of care.    Scooter Laboy, unable to do taxes,  recall, anxiety/stress, coping mechanisms, workplace tasks list and ways to maximize cognition, executive function list, word retrieval tasks, executive function tasks of increased difficulty in increased distractible environments, memory tasks with increased distraction, ?  IM therapy

## 2023-07-12 NOTE — PROGRESS NOTES
Daily Note     Today's date: 2023  Patient name: Katia Das  : 1970  MRN: [id number]  Referring provider: Lyric Lara DO  Dx:   Encounter Diagnosis     ICD-10-CM    1. Iliotibial band syndrome, right leg  M76.31                      Subjective:  Pt reports he continues to feel better. States he can bend his knee fully and ambulate with equal strides for the first time in 15 years. Doing bike at the end of the PT session last time felt really good. Objective:  See treatment diary below Survey Monkey given (_____) and FOTO given (_____)      Assessment:  Pt presented to outpatient physical therapy at [facility] with complaints of R posterior hip, lateral thigh, knee and leg pain. He presents with decreased R ITB flexibility, poor R LE balance, poor R patellar mobility, decreased tolerance to activity and decreased functional mobility due to Iliotibial band syndrome, right leg (primary encounter diagnosis). He is able to achieve full R knee flexion ROM and ambulate normally for the first time in many years now. R patellar mobility is still poor but much improved from no mobility at all at [facility] a few weeks ago. He will continue to benefit from skilled PT services in order to address these deficits and reach maximum level of function. Plan:  Add standing TB kicks with R LE next week.          POC EXPIRES On:  23  PRECAUTIONS:  None  CO-MORBIDITES:  Concussion  PERSONAL FACTORS:  None      Manuals HEP  7/3 7 7/10 7    TFM R distal ITB supine  IASTM  10 min 10 10 20 IASTM 10'    Patellar mobs R  5 min 5 min 5 5 5'                        Neuro Re-Ed     Bridges   5" 20 5" 20 5"x20 5" 20    S/L abduction R   20 20 20 20              FWD Lunge          LAT Lunge                              Ther Ex    Bike  L4  6 min L4 6' L5 6' L5  10 min L5 12'    Supine strap R ITB stretch  20"x5 20" 5 20" 5 no strap 20"x5 20" 5    Supine LTR to L only  10"x10 10" 10 10" 10 10"x10 10" 10    Cross leg stretch to L in supine  20"x5 20" 5 20" 5 20"x5 20" 5                                            Ther Activity                        Gait Training                        Modalities

## 2023-07-13 ENCOUNTER — OFFICE VISIT (OUTPATIENT)
Facility: CLINIC | Age: 53
End: 2023-07-13
Payer: COMMERCIAL

## 2023-07-13 DIAGNOSIS — R41.3 MEMORY PROBLEM: ICD-10-CM

## 2023-07-13 DIAGNOSIS — F32.A ANXIETY AND DEPRESSION: ICD-10-CM

## 2023-07-13 DIAGNOSIS — Z87.820 HISTORY OF CONCUSSION: Primary | ICD-10-CM

## 2023-07-13 DIAGNOSIS — F41.9 ANXIETY AND DEPRESSION: ICD-10-CM

## 2023-07-13 DIAGNOSIS — F43.10 PTSD (POST-TRAUMATIC STRESS DISORDER): ICD-10-CM

## 2023-07-13 PROCEDURE — 97530 THERAPEUTIC ACTIVITIES: CPT

## 2023-07-13 NOTE — PROGRESS NOTES
Occupational Therapy Daily Note:    Today's date: 2023  Patient name: Jyoti Barrios  : 1970  MRN: [id number]  Referring provider: Rick Arreola DO   Dx:   Encounter Diagnoses   Name Primary? • History of concussion Yes   • Memory problem    • Anxiety and depression    • PTSD (post-traumatic stress disorder)        Subjective: "I have been very up and down since last time I saw you; today's the date my disability is runs out."    Objective: Pt engaged in skilled OT treatment session with focus on fxnl cognition, short term memory, healthy coping education and leisure pursuits to increase engagement, endurance, tolerance, and independence with daily ADL and IADL tasks. CPT Code Minutes                                           Task Details        Therapeutic Activity  Pt engaged in session focused on anxiety/stress management techniques and return to work mangement. Anxiety Management:  Pt reports an increase of anxiety/panic attacks since last session with the following symptoms:   Dry heaving   Tremor of LUE  General weakness   Brain fog  Sweating     Pt identifies anxiety/panic attacks frequently occur in the morning time; prior to getting out of bed. Pt reports one of the triggering factors may be disorganization of his daily tasks. Discussed methods to increase organization including a bullet journal or happy journal. Pt reports SLP suggesting pt to be assessed for ADD. Discussed use of alarms as a anxiety/stress management technique to bring awareness to his own emotional responses. Pt with G carryover; Pt reporting that he is responding well to the use of alarms, however he would like to set them more frequently. Pt added alarms to go off every 2 hours daily. Discussed pt's progress regarding his awareness of his emotional responses. Pt with G ability to identify his deficits. With increasing anxiety pt is able to de-escalate himself prior to rage response.  Pt working to continue implementing anxiety/stress management strategies into his day. Recommended pt meditate within 1.5 hours of waking up to decrease his anxiety at the start of his day. Pt with G carry over with the use of his mantras. Return to work:   Began discussion of return to work; pt's disability end date was today 7/13/23. Pt asked to bring job description, expectations, and job tasks along with him last session; Pt brought past improvement plans, updated improvement plans, and other supporting documents to today's session. Discussed pt's job role and responsibilities at work to gain a better understanding of what is required of him. Pt expressed difficulty with executive functioning and organizing his thoughts; noted difficulty with situations that require multi-tasking. Observable increased anxiety with explanation of work tasks. Neuro Re-Ed               Therapeutic Exercise               Homework   6/22/23  Pt to utilize calendar to note the following through the week:   Decreased concentration/motivation when trying to engage in a task   fatigue levels   Emotional responses   Self-care activities   UPDATE: Pt with P carry over    6/8/2023  Continue to add meditation into weekly schedule 3-4x/week           Self Care           Assessment: Tolerated treatment well. Pt slow improvement with anxiety/stress management. Pt with increased awareness of his own emotional responses. Patient would benefit from continued skilled OT. Plan: Continued skilled OT per POC          INTERVENTION COMMENTS:  Diagnosis: History of concussion [Z87.820]  Precautions:   Insurance: Payor: Storm Sharyn / Plan: Stanley Coles PPO / Product Type: PPO /   Visits: 12  PN due 7/18/23

## 2023-07-14 ENCOUNTER — OFFICE VISIT (OUTPATIENT)
Facility: CLINIC | Age: 53
End: 2023-07-14
Payer: COMMERCIAL

## 2023-07-14 DIAGNOSIS — G44.86 CERVICOGENIC HEADACHE: ICD-10-CM

## 2023-07-14 DIAGNOSIS — R26.89 IMBALANCE: ICD-10-CM

## 2023-07-14 DIAGNOSIS — Z87.820 HISTORY OF CONCUSSION: Primary | ICD-10-CM

## 2023-07-14 PROCEDURE — 97110 THERAPEUTIC EXERCISES: CPT

## 2023-07-14 PROCEDURE — 97140 MANUAL THERAPY 1/> REGIONS: CPT

## 2023-07-14 NOTE — PROGRESS NOTES
Daily Note     Today's date: 2023  Patient name: Ritesh Dowell  : 1970  MRN: [id number]  Referring provider: Sirena Bonilla DO  Dx:   Encounter Diagnosis     ICD-10-CM    1. History of concussion  Z87.820       2. Imbalance  R26.89       3. Cervicogenic headache  G44.86           Start Time: 0930  Stop Time: 1015  Total time in clinic (min): 45 minutes    Subjective: Pt CT later today, feeling okay - not as much anxiety since getting up and getting ready. Headaches have been controlled. Neck ROM improving, still having some neck pain. Objective: See treatment diary below      Assessment:  Good repsonse to cervical manual with pt noting small cavitation during SNAGs. He reports less cervical pain post session. Pt did report feeling slight dizziness after SNAGs but reduced after discontinuing the exercise. Improving ROM with repetition during MET cervical sidebend. Increasing thoracic extension ROM, including rotation with good response. Added further mid/upper thoracic mobility with good response in gaining ROM. Patient would benefit from continued PT      Plan: Continue per plan of care. Continue cervical care.      Precautions: cluster HA, PTSD     * HEP: give NV please      Manuals 5/30 6/6 6/8 6/13 6/15 6/20 6/22 6/26 6/27 6/29 7/10 7/14   SOR VR AF AF AF  AF 6min AF 6min AF 6min AF 8min AF 8min AF 8min AF 10min   Manual cervical traction VR AF AF AF  AF AF AF AF AF AF AF   PA mobs (C3-C5) VR        AF AF AF AF   Unilateral PA pressure L side VR              Periscapular TrP      AF 4min total L scap AF 5min total L scap AF 5min total L scap AF 5min L scap   AF 5min L scap + MET for LS   Neuro Re-Ed          FGA, mCTSIB     Walking over hurdles w/ foam between               Tandem amb NV              Walking on foam w/ cone taps               Walking over hidden objects               HT amb  20ft x4, x2 bwd Foam beam 8 laps            Foam  Mod tandem EC 30" x1 ea Cone  cog task x4 Foam tandem cone  4 laps           Agility ladder     Fwd 2 laps              Ther Ex               Pt education HEP, POC, findings, adding R hip/knee dx              UT stretch NV              LS stretch NV     scap hold 20" x3 Scap hold 30" x3 scap hold 30" x3 Into flexion 20" x3 ea gentle ROM      Cervical rotation SNAGs NV x5 ea 5'    x8 ea 5' x8 5' ea x8 5' ea   x10 5' ea x10 5' ea   Scap protraction/retraction      x8 ea x8 x8       Extension/flexion cervical       Towel resist x20 ea    Towel resistance x20 Towel resistance x20   Thoracic rot      x5 ea + x5 t/s side bend x5 ea L lat stretch thoracic ext x10 thoracic ext x5    Open book x10 1/2 kneel position on foam  thoracic ext in standing x8 unilat OH // bar    Seated thoracic ext x10 + rot thoracic ext seated over bolster + rot x10    Dowel OH thoracic rot + sidebend x6 ea    Open book 1/2 kneel x8 ea   Cervical ext SNAGs NV     x5 5'         Lumbar flexion  x8             Lumbar side bend  x8 R             Aerobic   lvl 10 6min RPE 5-6/10 Lvl 6 recumbent RPE 4/10 5min           Sled push   10plates + 2 34DO 62VA 2x2 10 plates + 78IP 07WD x4, x2           Ther Activity                                             Gait Training                                             Modalities

## 2023-07-17 ENCOUNTER — OFFICE VISIT (OUTPATIENT)
Dept: SPEECH THERAPY | Facility: CLINIC | Age: 53
End: 2023-07-17
Payer: COMMERCIAL

## 2023-07-17 ENCOUNTER — HOSPITAL ENCOUNTER (OUTPATIENT)
Dept: SLEEP CENTER | Facility: HOSPITAL | Age: 53
Discharge: HOME | End: 2023-07-17
Payer: COMMERCIAL

## 2023-07-17 ENCOUNTER — OFFICE VISIT (OUTPATIENT)
Dept: PHYSICAL THERAPY | Facility: CLINIC | Age: 53
End: 2023-07-17
Payer: COMMERCIAL

## 2023-07-17 DIAGNOSIS — M76.31 ILIOTIBIAL BAND SYNDROME, RIGHT LEG: Primary | ICD-10-CM

## 2023-07-17 DIAGNOSIS — R41.3 MEMORY PROBLEM: ICD-10-CM

## 2023-07-17 DIAGNOSIS — R41.841 COGNITIVE COMMUNICATION DEFICIT: ICD-10-CM

## 2023-07-17 DIAGNOSIS — G43.009 MIGRAINE WITHOUT AURA AND WITHOUT STATUS MIGRAINOSUS, NOT INTRACTABLE: ICD-10-CM

## 2023-07-17 DIAGNOSIS — R29.818 SUSPECTED SLEEP APNEA: ICD-10-CM

## 2023-07-17 DIAGNOSIS — G47.9 SLEEP DIFFICULTIES: ICD-10-CM

## 2023-07-17 DIAGNOSIS — F43.10 PTSD (POST-TRAUMATIC STRESS DISORDER): ICD-10-CM

## 2023-07-17 DIAGNOSIS — F32.A ANXIETY AND DEPRESSION: ICD-10-CM

## 2023-07-17 DIAGNOSIS — F41.9 ANXIETY AND DEPRESSION: ICD-10-CM

## 2023-07-17 DIAGNOSIS — E66.9 OBESITY (BMI 30-39.9): ICD-10-CM

## 2023-07-17 DIAGNOSIS — Z87.820 HISTORY OF CONCUSSION: Primary | ICD-10-CM

## 2023-07-17 PROCEDURE — 97140 MANUAL THERAPY 1/> REGIONS: CPT | Performed by: PHYSICAL THERAPIST

## 2023-07-17 PROCEDURE — G0399 HOME SLEEP TEST/TYPE 3 PORTA: HCPCS

## 2023-07-17 PROCEDURE — 97110 THERAPEUTIC EXERCISES: CPT | Performed by: PHYSICAL THERAPIST

## 2023-07-17 PROCEDURE — 92507 TX SP LANG VOICE COMM INDIV: CPT

## 2023-07-17 NOTE — PROGRESS NOTES
Daily Note     Today's date: 2023  Patient name: Su Fuentes  : 1970  MRN: [id number]  Referring provider: Vasu Perea DO  Dx:   Encounter Diagnosis     ICD-10-CM    1. Iliotibial band syndrome, right leg  M76.31           Start Time: 1445  Stop Time: 1530  Total time in clinic (min): 45 minutes    Subjective: Patient reports that Thomas Wyman continues to improve with PT.     Objective: See treatment diary below      Assessment: Cupping introduced today to address fascial mobility, which patient tolerated well. Continue to address tissue restriction/proximal hip strength, as able. Plan: Continue per plan of care.       Precautions: cluster HA, PTSD     POC EXPIRES On:  23  PRECAUTIONS:  None  CO-MORBIDITES:  Concussion  PERSONAL FACTORS:  None      Manuals HEP 6/30 7/3 7/7 7/10 7/12 7/17   TFM R distal ITB supine  IASTM  10 min 10 10 20 IASTM 10' IASTM/cupping 20'   Patellar mobs R  5 min 5 min 5 5 5' 5'                       Neuro Re-Ed     Bridges   5" 20 5" 20 5"x20 5" 20 nv   S/L abduction R   20 20 20 20 nv             FWD Lunge          LAT Lunge                              Ther Ex    Bike  L4  6 min L4 6' L5 6' L5  10 min L5 12' L5 12'   Supine strap R ITB stretch  20"x5 20" 5 20" 5 no strap 20"x5 20" 5 5x20" c cups   Supine LTR to L only  10"x10 10" 10 10" 10 10"x10 10" 10 10x10" c cups   Cross leg stretch to L in supine  20"x5 20" 5 20" 5 20"x5 20" 5 nv                                           Ther Activity                        Gait Training                        Modalities

## 2023-07-17 NOTE — PROGRESS NOTES
Daily Speech Treatment Note    Today's date: 2023  Patient’s name: Marta King  : 1970  MRN: [id number]  Safety measures:   Referring provider: DO Shazia Cannon Diagnosis     ICD-10-CM    1. History of concussion  Z87.820       2. Memory problem  R41.3       3. Cognitive communication deficit  R41. 841           Visit trackin    Subjective/Behavioral:  - Pleasant/Cooperative        Objective/Assessment:  Completed structured activities with patient to target goals below. Results as stated below. Plan [place]okey the bear paragraph and [place] next session. Short-term goals:       Complete executive function task list.  : Provided list. Patient circled most of tasks that he has difficulty with with high priorities being:      Complete falls, risk, harm assessment. 23: Completed.      Patient will be educated on word finding strategies (i.e., circumlocution) for improved generative naming and verbal expression skills. 6/15: Provided word retrieval strategies, expressed understanding.     Patient will complete complex auditory attention processing tasks (e.g., sentence unscramble, ranking numbers/words, etc.) in increased distractible environment to improve working memory with 80% accuracy. : Working memory tasks: word order 3 words: 100%, 4 words increased time and occasional rep 100% Able to also complete reverse order at 3 and 4 words, immediate recall of 1st column answer after completing distracting column/activity: 63% activity, increased to 100% with minimal cue. Patient with difficulty / frustrated with this task, cued for him to use stress reduction techniques.  : Recall of first column answer recall today with earlier activity presented on - attained 100% accuracy.       Patient will facilitate planning by completing thought organization tasks (e.g., sequencing, deduction puzzles, etc.) with 80% accuracy to facilitate increased executive functioning, working memory, problem solving, and processing skills. 6/19: Completed organizational tasks given information: 100% easier /scheduling tasks and higher level tasks with min cues- 100% accuracy. Provided more for homework.     Patient will complete auditory immediate and short term memory tasks to 80% accuracy to facilitate increased ability to retell narratives and recall information within functional living environment. 7/17: Recall of % of details following short paragraphs. Independently recalled all events from earlier today.     To target mental manipulation and working memory, patient will participate in word finding activity (i.e., anagrams) with 80% accuracy. 6/15: Completed anagrams with 80% accuracy. Provided packet for home. Also, started work with opposites and synonyms, completed with increased time - 80% accuracy.     Patient will answer questions regarding story read aloud in quiet and distractible environment with 80% accuracy to facilitate improved auditory comprehension and recall. 7/17: % recall of short paragraphs     Patient will complete reading comprehension tasks (e.g., answering questions, following complex written directions, etc.) to 80% accuracy to facilitate carryover of comprehension of functional reading materials. 7/17: Answered questions following reading of paragraph, 100% accuracy.      Patient will demonstrate divided attention by responding to multiple tasks or details within tasks at the same time with min cues in a distracting environment with 80% accuracy.       Patient will complete semi complex & complex tasks of increasing difficulty with multiple steps with fading cues to increase organization and accuracy with 80% accuracy.     Patient will express understanding of ways to maximize attention/focus, problem solving and recall in work place tasks with independence. 6/6: Educated on ways to maximize attention and focus, provided handout. Patient expressed understanding. ? [employer] indira. 6/19: Provided lists of apps, workbooks, games to maximize attention/focus, word retrieval.          Patient will be educated on the use of internal and external memory aids and compensatory strategies to maximize attention/focus with 80% accuracy to facilitate increased recall of routine, personal information, and recent events. 6/15: Provided information on helpful indira for anxiety, insight timer - body scan relaxation, sleep and timer meditation assistance. 6/29: Patient very angry, upset, crying initial part of session. Unable to problem solve for redirection, use positive self talk. Ultimately, patient agreed to going for a walk / boxed breathing, calmed down. Then, I put on a body scan meditation for patient for 20 minutes. Patient able to sustain attention/focus and complete breathing throughout this task x 20 minutes. Encouraged patient to utilize these techniques when feeling slightly agitated, tense. 7/12: Provided list of memory strategies to promote attention/focusand recall.  Encouraged patient to utilize morning steady routine of meditations, out loud as doing things and to obtain/ start using bullet journal in helping organize information and duties in a time efficient and organized manner.        Long Term     Patient will complete cognitive-linguistic therapy that addresses patient's specific deficits in processing speed, short-term working memory, attention to detail, monitoring, sequencing, and organization skills, with instruction, to alleviate effects of executive functioning disorder deficits by discharge.     Patient will complete higher-level expressive language tasks (e.g., word definitions, idioms, synonym/antonyms, etc) with 80% accuracy to improve functional communication skills by discharge.      Patient will improve ability to facilitate cognitive function and communication skills including use of compensatory strategies in a variety of functional living tasks to improve quality of like and to maximize level of independence.                  Plan:  -Continue with current plan of care. Skeet Holter, unable to do taxes,  recall, anxiety/stress, coping mechanisms, workplace tasks list and ways to maximize cognition, executive function list, word retrieval tasks, executive function tasks of increased difficulty in increased distractible environments, memory tasks with increased distraction, ?  IM therapy

## 2023-07-18 ENCOUNTER — OFFICE VISIT (OUTPATIENT)
Facility: CLINIC | Age: 53
End: 2023-07-18
Payer: COMMERCIAL

## 2023-07-18 DIAGNOSIS — F43.10 PTSD (POST-TRAUMATIC STRESS DISORDER): ICD-10-CM

## 2023-07-18 DIAGNOSIS — R41.3 MEMORY PROBLEM: ICD-10-CM

## 2023-07-18 DIAGNOSIS — Z87.820 HISTORY OF CONCUSSION: Primary | ICD-10-CM

## 2023-07-18 DIAGNOSIS — F32.A ANXIETY AND DEPRESSION: ICD-10-CM

## 2023-07-18 DIAGNOSIS — F41.9 ANXIETY AND DEPRESSION: ICD-10-CM

## 2023-07-18 PROCEDURE — 97530 THERAPEUTIC ACTIVITIES: CPT
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
## 2023-07-19 ENCOUNTER — OFFICE VISIT (OUTPATIENT)
Dept: PHYSICAL THERAPY | Facility: CLINIC | Age: 53
End: 2023-07-19
Payer: COMMERCIAL

## 2023-07-19 ENCOUNTER — OFFICE VISIT (OUTPATIENT)
Facility: CLINIC | Age: 53
End: 2023-07-19
Payer: COMMERCIAL

## 2023-07-19 DIAGNOSIS — M76.31 ILIOTIBIAL BAND SYNDROME, RIGHT LEG: Primary | ICD-10-CM

## 2023-07-19 DIAGNOSIS — R26.89 IMBALANCE: ICD-10-CM

## 2023-07-19 DIAGNOSIS — Z87.820 HISTORY OF CONCUSSION: Primary | ICD-10-CM

## 2023-07-19 DIAGNOSIS — G44.86 CERVICOGENIC HEADACHE: ICD-10-CM

## 2023-07-19 PROCEDURE — 97110 THERAPEUTIC EXERCISES: CPT

## 2023-07-19 PROCEDURE — 97112 NEUROMUSCULAR REEDUCATION: CPT

## 2023-07-19 PROCEDURE — 97140 MANUAL THERAPY 1/> REGIONS: CPT

## 2023-07-19 NOTE — PROGRESS NOTES
Daily Note     Today's date: 2023  Patient name: Benjamin Sandhu  : 1970  MRN: [id number]  Referring provider: Endy Soares DO  Dx:   Encounter Diagnosis     ICD-10-CM    1. History of concussion  Z87.820       2. Imbalance  R26.89       3. Cervicogenic headache  G44.86           Start Time: 0930  Stop Time: 1015  Total time in clinic (min): 45 minutes    Subjective: CT scan saw degenerative changes and disc herniation. No headaches recently. Neck pain has been better. Objective: See treatment diary below      Assessment:  Pt reports reduction in cervical pain post manuals and with mobility drills. Shows good form with minimal cues. Few cavitations heard during SNAGs and muscle energy techniques with pt reporting improvement in ROM/less stiffness. Patient would benefit from continued PT       Plan: Continue per plan of care. Continue cervical care.      Precautions: cluster HA, PTSD     * HEP: give NV please      Manuals 5/30 6/6 6/8 6/13 6/15 6/20 6/22 6/26 6/27 6/29 7/10 7/14 7/19   SOR VR AF AF AF  AF 6min AF 6min AF 6min AF 8min AF 8min AF 8min AF 10min AF 10min   Manual cervical traction VR AF AF AF  AF AF AF AF AF AF AF AF   PA mobs (C3-C5) VR        AF AF AF AF AF   Unilateral PA pressure L side VR               Periscapular TrP      AF 4min total L scap AF 5min total L scap AF 5min total L scap AF 5min L scap   AF 5min L scap + MET for LS AF5min L scap + MET for LS   Neuro Re-Ed          FGA, mCTSIB      Walking over hurdles w/ foam between                Tandem amb NV               Walking on foam w/ cone taps                Walking over hidden objects                HT amb  20ft x4, x2 bwd Foam beam 8 laps             Foam  Mod tandem EC 30" x1 ea Cone  cog task x4 Foam tandem cone  4 laps            Agility ladder     Fwd 2 laps               Ther Ex                Pt education HEP, POC, findings, adding R hip/knee dx               UT stretch NV               LS stretch NV     scap hold 20" x3 Scap hold 30" x3 scap hold 30" x3 Into flexion 20" x3 ea gentle ROM       Cervical rotation SNAGs NV x5 ea 5'    x8 ea 5' x8 5' ea x8 5' ea   x10 5' ea x10 5' ea x10 5' ea   Scap protraction/retraction      x8 ea x8 x8        Extension/flexion cervical       Towel resist x20 ea    Towel resistance x20 Towel resistance x20 Towel resistance x10   Thoracic rot      x5 ea + x5 t/s side bend x5 ea L lat stretch thoracic ext x10 thoracic ext x5    Open book x10 1/2 kneel position on foam  thoracic ext in standing x8 unilat OH // bar    Seated thoracic ext x10 + rot thoracic ext seated over bolster + rot x10    Dowel OH thoracic rot + sidebend x6 ea    Open book 1/2 kneel x8 ea thoracic ext seated over bolster + rot x10    Open book 1/2 kneel x8 ea    L lat stretch standing x5 10"   Cervical ext SNAGs NV     x5 5'          Lumbar flexion  x8              Lumbar side bend  x8 R              Aerobic   lvl 10 6min RPE 5-6/10 Lvl 6 recumbent RPE 4/10 5min            Sled push   10plates + 2 55FE 40RT 2x2 10 plates + 87CG 30RR x4, x2            Ther Activity                                                Gait Training                                                Modalities

## 2023-07-19 NOTE — PROGRESS NOTES
Home Sleep Study Documentation    HOME STUDY DEVICE: Noxturnal no                                           Nidhi G3 yes      Pre-Sleep Home Study:    Set-up and instructions performed by: Rupinder Ware performed demonstration for Patient: yes    Return demonstration performed by Patient: yes    Written instructions provided to Patient: yes    Patient signed consent form: yes        Post-Sleep Home Study:    Additional comments by Patient: none     Home Sleep Study Failed:no:    Failure reason: N/A    Reported or Detected: N/A    Scored by: Rowdy Woods

## 2023-07-19 NOTE — PROGRESS NOTES
Daily Note     Today's date: 2023  Patient name: Kymberly Chavez  : 1970  MRN: [id number]  Referring provider: Sherry Erazo DO  Dx:   Encounter Diagnosis     ICD-10-CM    1. Iliotibial band syndrome, right leg  M76.31                      Subjective: Patient reports that Mya Perez continues to improve with PT.     Objective: See treatment diary below      Assessment: Pt tolerated stationary bike better than previous visits. Continue to address tissue restriction/proximal hip strength, as able. Plan: Continue per plan of care.       Precautions: cluster HA, PTSD     POC EXPIRES On:  23  PRECAUTIONS:  None  CO-MORBIDITES:  Concussion  PERSONAL FACTORS:  None      Manuals HEP 7/10 7/12 7/17 7/19   TFM R distal ITB supine  20 IASTM 10' IASTM/cupping 20' 15   Patellar mobs R  5 5' 5' 5                   Neuro Re-Ed     Bridges  5"x20 5" 20 nv 5" 20   S/L abduction R  20 20 nv 20           FWD Lunge        LAT Lunge                        Ther Ex    Bike  L5  10 min L5 12' L5 12' L5 12'   Supine strap R ITB stretch  20"x5 20" 5 5x20" c cups 5" 20   Supine LTR to L only  10"x10 10" 10 10x10" c cups 10x10   Cross leg stretch to L in supine  20"x5 20" 5 nv 20" 5                                   Ther Activity                    Gait Training                    Modalities

## 2023-07-20 ENCOUNTER — OFFICE VISIT (OUTPATIENT)
Facility: CLINIC | Age: 53
End: 2023-07-20
Payer: COMMERCIAL

## 2023-07-20 DIAGNOSIS — F41.9 ANXIETY AND DEPRESSION: ICD-10-CM

## 2023-07-20 DIAGNOSIS — F32.A ANXIETY AND DEPRESSION: ICD-10-CM

## 2023-07-20 DIAGNOSIS — Z87.820 HISTORY OF CONCUSSION: Primary | ICD-10-CM

## 2023-07-20 DIAGNOSIS — F43.10 PTSD (POST-TRAUMATIC STRESS DISORDER): ICD-10-CM

## 2023-07-20 DIAGNOSIS — R41.3 MEMORY PROBLEM: ICD-10-CM

## 2023-07-20 PROCEDURE — 97530 THERAPEUTIC ACTIVITIES: CPT
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
## 2023-07-21 ENCOUNTER — OFFICE VISIT (OUTPATIENT)
Facility: CLINIC | Age: 53
End: 2023-07-21
Payer: COMMERCIAL

## 2023-07-21 DIAGNOSIS — R26.89 IMBALANCE: ICD-10-CM

## 2023-07-21 DIAGNOSIS — Z87.820 HISTORY OF CONCUSSION: Primary | ICD-10-CM

## 2023-07-21 DIAGNOSIS — G44.86 CERVICOGENIC HEADACHE: ICD-10-CM

## 2023-07-21 PROCEDURE — 97110 THERAPEUTIC EXERCISES: CPT

## 2023-07-21 PROCEDURE — 97140 MANUAL THERAPY 1/> REGIONS: CPT

## 2023-07-21 NOTE — PROGRESS NOTES
Daily Note     Today's date: 2023  Patient name: Matheus Braga  : 1970  MRN: [id number]  Referring provider: Rowan Parsons DO  Dx:   Encounter Diagnosis     ICD-10-CM    1. History of concussion  Z87.820       2. Imbalance  R26.89       3. Cervicogenic headache  G44.86           Start Time: 09  Stop Time: 3964  Total time in clinic (min): 40 minutes    Subjective: Saw PCP. Has referral for neurosx, wondering who he should see. Objective: See treatment diary below      Assessment:  Pt reports reproduction of HA with L and R cervical soft tissue mobilization with good response and audible cavitations with SNAGs and muscle energy techniques. Pt demonstrates improving ROM of cervical spine after manuals and exercises. Good form with exercises. Patient would benefit from continued PT to improve cervical pain and headaches. Plan: Continue per plan of care. Continue cervical care.      Precautions: cluster HA, PTSD     * HEP: give NV please      Manuals 5/30 6/6 6/8 6/13 6/15 6/20 6/22 6/26 6/27 6/29 7/10 7/14 7/19 7/21   SOR VR AF AF AF  AF 6min AF 6min AF 6min AF 8min AF 8min AF 8min AF 10min AF 10min AF 10min   Manual cervical traction VR AF AF AF  AF AF AF AF AF AF AF AF AF   PA mobs (C3-C5) VR        AF AF AF AF AF AF   Unilateral PA pressure L side VR                Periscapular TrP      AF 4min total L scap AF 5min total L scap AF 5min total L scap AF 5min L scap   AF 5min L scap + MET for LS AF5min L scap + MET for LS AF8min L + MET for LS   Neuro Re-Ed          FGA, mCTSIB       Walking over hurdles w/ foam between                 Tandem amb NV                Walking on foam w/ cone taps                 Walking over hidden objects                 HT amb  20ft x4, x2 bwd Foam beam 8 laps              Foam  Mod tandem EC 30" x1 ea Cone  cog task x4 Foam tandem cone  4 laps             Agility ladder     Fwd 2 laps                Ther Ex                 Pt education HEP, POC, findings, adding R hip/knee dx                UT stretch NV                LS stretch NV     scap hold 20" x3 Scap hold 30" x3 scap hold 30" x3 Into flexion 20" x3 ea gentle ROM        Cervical rotation SNAGs NV x5 ea 5'    x8 ea 5' x8 5' ea x8 5' ea   x10 5' ea x10 5' ea x10 5' ea x10 5' ea    Scap protraction/retraction      x8 ea x8 x8         Extension/flexion cervical       Towel resist x20 ea    Towel resistance x20 Towel resistance x20 Towel resistance x10    Thoracic rot      x5 ea + x5 t/s side bend x5 ea L lat stretch thoracic ext x10 thoracic ext x5    Open book x10 1/2 kneel position on foam  thoracic ext in standing x8 unilat OH // bar    Seated thoracic ext x10 + rot thoracic ext seated over bolster + rot x10    Dowel OH thoracic rot + sidebend x6 ea    Open book 1/2 kneel x8 ea thoracic ext seated over bolster + rot x10    Open book 1/2 kneel x8 ea    L lat stretch standing x5 10" BL lat stertch standing // bar x10    Thoracic ext seated over bolster + rotation x10    Standing rotation against cubby wall x10 ea   Cervical ext SNAGs NV     x5 5'           Lumbar flexion  x8               Lumbar side bend  x8 R               Aerobic   lvl 10 6min RPE 5-6/10 Lvl 6 recumbent RPE 4/10 5min             Sled push   10plates + 2 70VM 53GX 2x2 10 plates + 38QK 39FP x4, x2             Ther Activity                                                   Gait Training                                                   Modalities

## 2023-07-23 PROCEDURE — 95806 SLEEP STUDY UNATT&RESP EFFT: CPT | Performed by: PSYCHIATRY & NEUROLOGY

## 2023-07-24 ENCOUNTER — OFFICE VISIT (OUTPATIENT)
Dept: PHYSICAL THERAPY | Facility: CLINIC | Age: 53
End: 2023-07-24
Payer: COMMERCIAL

## 2023-07-24 ENCOUNTER — OFFICE VISIT (OUTPATIENT)
Dept: SPEECH THERAPY | Facility: CLINIC | Age: 53
End: 2023-07-24
Payer: COMMERCIAL

## 2023-07-24 DIAGNOSIS — R41.841 COGNITIVE COMMUNICATION DEFICIT: ICD-10-CM

## 2023-07-24 DIAGNOSIS — Z87.820 HISTORY OF CONCUSSION: Primary | ICD-10-CM

## 2023-07-24 DIAGNOSIS — M76.31 ILIOTIBIAL BAND SYNDROME, RIGHT LEG: Primary | ICD-10-CM

## 2023-07-24 DIAGNOSIS — R41.3 MEMORY PROBLEM: ICD-10-CM

## 2023-07-24 PROCEDURE — 97140 MANUAL THERAPY 1/> REGIONS: CPT

## 2023-07-24 PROCEDURE — 92507 TX SP LANG VOICE COMM INDIV: CPT

## 2023-07-24 PROCEDURE — 97112 NEUROMUSCULAR REEDUCATION: CPT

## 2023-07-24 PROCEDURE — 97110 THERAPEUTIC EXERCISES: CPT

## 2023-07-24 NOTE — PROGRESS NOTES
Daily Speech Treatment Note    Today's date: 2023  Patient’s name: Marleen Waddell  : 1970  MRN: [id number]  Safety measures:   Referring provider: Jamie Houston DO    Encounter Diagnosis     ICD-10-CM    1. History of concussion  Z87.820       2. Memory problem  R41.3       3. Cognitive communication deficit  R41. 841           Visit trackin    Subjective/Behavioral:  - Pleasant/Cooperative        Objective/Assessment:  Completed structured activities with patient to target goals below. Results as stated below. Plan Smokey the bear paragraph and Florence Trejo next session. Short-term goals:       Complete executive function task list.  : Provided list. Patient circled most of tasks that he has difficulty with with high priorities being:      Complete falls, risk, harm assessment. 23: Completed.      Patient will be educated on word finding strategies (i.e., circumlocution) for improved generative naming and verbal expression skills. 6/15: Provided word retrieval strategies, expressed understanding.     Patient will complete complex auditory attention processing tasks (e.g., sentence unscramble, ranking numbers/words, etc.) in increased distractible environment to improve working memory with 80% accuracy. : Working memory tasks: word order 3 words: 100%, 4 words increased time and occasional rep 100% Able to also complete reverse order at 3 and 4 words, immediate recall of 1st column answer after completing distracting column/activity: 63% activity, increased to 100% with minimal cue. Patient with difficulty / frustrated with this task, cued for him to use stress reduction techniques.  : Recall of first column answer recall today with earlier activity presented on - attained 100% accuracy.       Patient will facilitate planning by completing thought organization tasks (e.g., sequencing, deduction puzzles, etc.) with 80% accuracy to facilitate increased executive functioning, working memory, problem solving, and processing skills. 6/19: Completed organizational tasks given information: 100% easier /scheduling tasks and higher level tasks with min cues- 100% accuracy. Provided more for homework.     Patient will complete auditory immediate and short term memory tasks to 80% accuracy to facilitate increased ability to retell narratives and recall information within functional living environment. 7/17: Recall of % of details following short paragraphs. Independently recalled all events from earlier today.     To target mental manipulation and working memory, patient will participate in word finding activity (i.e., anagrams) with 80% accuracy. 6/15: Completed anagrams with 80% accuracy. Provided packet for home. Also, started work with opposites and synonyms, completed with increased time - 80% accuracy.     Patient will answer questions regarding story read aloud in quiet and distractible environment with 80% accuracy to facilitate improved auditory comprehension and recall. 7/17: % recall of short paragraphs  7/24: Answering questions after being read a long paragraph: Kiley Carter paragraph: 80% accuracy.     Patient will complete reading comprehension tasks (e.g., answering questions, following complex written directions, etc.) to 80% accuracy to facilitate carryover of comprehension of functional reading materials. 7/17: Answered questions following reading of paragraph, 100% accuracy.      Patient will demonstrate divided attention by responding to multiple tasks or details within tasks at the same time with min cues in a distracting environment with 80% accuracy. 7/24: [employer]: Planning events: Initially scored 8614 VEASYT, earning 3/5 points because patient with decreased attention, able to perfect answer with min cues. Able to attend and focus and complete rest of questions with good accuracy, 90% occasional min cues.  Ready to progress to next task, Scheduling Tasks. Will check with OT to see if they are writing out accomodations for work.     Patient will complete semi complex & complex tasks of increasing difficulty with multiple steps with fading cues to increase organization and accuracy with 80% accuracy. 7/24: Patient set up bullet journal for personal use and organizing ideas and thoughts and plans accordingly and effectively, suggested to use a full page for daily rapid log and planning and also to work on prioritizing.      Patient will express understanding of ways to maximize attention/focus, problem solving and recall in work place tasks with independence. 6/6: Educated on ways to maximize attention and focus, provided handout. Patient expressed understanding. ? mSpoke indira. 6/19: Provided lists of apps, workbooks, games to maximize attention/focus, word retrieval.          Patient will be educated on the use of internal and external memory aids and compensatory strategies to maximize attention/focus with 80% accuracy to facilitate increased recall of routine, personal information, and recent events. 6/15: Provided information on helpful indira for anxiety, insight timer - body scan relaxation, sleep and timer meditation assistance. 6/29: Patient very angry, upset, crying initial part of session. Unable to problem solve for redirection, use positive self talk. Ultimately, patient agreed to going for a walk / boxed breathing, calmed down. Then, I put on a body scan meditation for patient for 20 minutes. Patient able to sustain attention/focus and complete breathing throughout this task x 20 minutes. Encouraged patient to utilize these techniques when feeling slightly agitated, tense. 7/12: Provided list of memory strategies to promote attention/focusand recall.  Encouraged patient to utilize morning steady routine of meditations, out loud as doing things and to obtain/ start using bullet journal in helping organize information and duties in a time efficient and organized manner.        Long Term     Patient will complete cognitive-linguistic therapy that addresses patient's specific deficits in processing speed, short-term working memory, attention to detail, monitoring, sequencing, and organization skills, with instruction, to alleviate effects of executive functioning disorder deficits by discharge.     Patient will complete higher-level expressive language tasks (e.g., word definitions, idioms, synonym/antonyms, etc) with 80% accuracy to improve functional communication skills by discharge.      Patient will improve ability to facilitate cognitive function and communication skills including use of compensatory strategies in a variety of functional living tasks to improve quality of like and to maximize level of independence.                  Plan:  -Continue with current plan of care. Emmett Brunner, unable to do taxes,  recall, anxiety/stress, coping mechanisms, workplace tasks list and ways to maximize cognition, executive function list, word retrieval tasks, executive function tasks of increased difficulty in increased distractible environments, memory tasks with increased distraction, ?  IM therapy

## 2023-07-24 NOTE — PROGRESS NOTES
Daily Note     Today's date: 2023  Patient name: Kandi Moulton  : 1970  MRN: [id number]  Referring provider: Octaviano Rahman DO  Dx:   Encounter Diagnosis     ICD-10-CM    1. Iliotibial band syndrome, right leg  M76.31                      Subjective: Patient reports that Tammie Brunner continues to improve with PT.     Objective: See treatment diary below      Assessment: Pt tolerated stationary bike better than previous visits. Continue to address tissue restriction/proximal hip strength, as able. Plan: Continue per plan of care.       Precautions: cluster HA, PTSD     POC EXPIRES On:  23  PRECAUTIONS:  None  CO-MORBIDITES:  Concussion  PERSONAL FACTORS:  None      Manuals HEP 7/10 7/12 7/17 7/19 7/24   TFM R distal ITB supine  20 IASTM 10' IASTM/cupping 20' 15 15   Patellar mobs R  5 5' 5' 5 5                     Neuro Re-Ed      Bridges  5"x20 5" 20 nv 5" 20 5" 20   S/L abduction R  20 20 nv 20 20            FWD Lunge         LAT Lunge                           Ther Ex     Bike  L5  10 min L5 12' L5 12' L5 12' L5 12'   Supine strap R ITB stretch  20"x5 20" 5 5x20" c cups 5" 20 5" 20   Supine LTR to L only  10"x10 10" 10 10x10" c cups 10x10 10" 10   Cross leg stretch to L in supine  20"x5 20" 5 nv 20" 5 20" 5                                       Ther Activity                       Gait Training                       Modalities

## 2023-07-25 ENCOUNTER — OFFICE VISIT (OUTPATIENT)
Facility: CLINIC | Age: 53
End: 2023-07-25
Payer: COMMERCIAL

## 2023-07-25 DIAGNOSIS — F32.A ANXIETY AND DEPRESSION: ICD-10-CM

## 2023-07-25 DIAGNOSIS — G44.86 CERVICOGENIC HEADACHE: ICD-10-CM

## 2023-07-25 DIAGNOSIS — Z87.820 HISTORY OF CONCUSSION: Primary | ICD-10-CM

## 2023-07-25 DIAGNOSIS — R41.3 MEMORY PROBLEM: ICD-10-CM

## 2023-07-25 DIAGNOSIS — F41.9 ANXIETY AND DEPRESSION: ICD-10-CM

## 2023-07-25 DIAGNOSIS — R26.89 IMBALANCE: ICD-10-CM

## 2023-07-25 DIAGNOSIS — F43.10 PTSD (POST-TRAUMATIC STRESS DISORDER): ICD-10-CM

## 2023-07-25 PROCEDURE — 97110 THERAPEUTIC EXERCISES: CPT

## 2023-07-25 PROCEDURE — 97140 MANUAL THERAPY 1/> REGIONS: CPT

## 2023-07-25 PROCEDURE — 97530 THERAPEUTIC ACTIVITIES: CPT

## 2023-07-25 NOTE — PROGRESS NOTES
Daily Note     Today's date: 2023  Patient name: Meseret Banegas  : 1970  MRN: [id number]  Referring provider: Komal Nathan DO  Dx:   Encounter Diagnosis     ICD-10-CM    1. History of concussion  Z87.820       2. Imbalance  R26.89       3. Cervicogenic headache  G44.86           Start Time: 1015  Stop Time: 1100  Total time in clinic (min): 45 minutes    Subjective: has a lot more stress going on than usual. Influencing some of his pain. Denies any imbalance related to his neck. Objective: See treatment diary below      Assessment:  Pt appears to have improved cervical and thoracic ROM after manuals and exercise. Pt also reports more pain with cervical SNAGs than usual. Good form with exercises, discussed potentially reducing to 1x/week in august, pt in agreement with plan. Patient would benefit from continued PT to improve cervical pain and headaches. Plan: Continue per plan of care. Continue cervical care.      Precautions: cluster HA, PTSD     * HEP: give NV please      Manuals 5/30 6/6 6/8 6/13 6/15 6/20 6/22 6/26 6/27 6/29 7/10 7/14 7/19 7/21 7/25   SOR VR AF AF AF  AF 6min AF 6min AF 6min AF 8min AF 8min AF 8min AF 10min AF 10min AF 10min AF 10min   Manual cervical traction VR AF AF AF  AF AF AF AF AF AF AF AF AF AF   PA mobs (C3-C5) VR        AF AF AF AF AF AF AF   Unilateral PA pressure L side VR                 Periscapular TrP      AF 4min total L scap AF 5min total L scap AF 5min total L scap AF 5min L scap   AF 5min L scap + MET for LS AF5min L scap + MET for LS AF8min L + MET for LS AF 8min L + MET for LS   Neuro Re-Ed          FGA, mCTSIB        Walking over hurdles w/ foam between                  Tandem amb NV                 Walking on foam w/ cone taps                  Walking over hidden objects                  HT amb  20ft x4, x2 bwd Foam beam 8 laps               Foam  Mod tandem EC 30" x1 ea Cone  cog task x4 Foam tandem cone  4 laps              Agility ladder     Fwd 2 laps                 Ther Ex                  Pt education HEP, POC, findings, adding R hip/knee dx                 UT stretch NV                 LS stretch NV     scap hold 20" x3 Scap hold 30" x3 scap hold 30" x3 Into flexion 20" x3 ea gentle ROM         Cervical rotation SNAGs NV x5 ea 5'    x8 ea 5' x8 5' ea x8 5' ea   x10 5' ea x10 5' ea x10 5' ea x10 5' ea  x10 5' ea   Scap protraction/retraction      x8 ea x8 x8          Extension/flexion cervical       Towel resist x20 ea    Towel resistance x20 Towel resistance x20 Towel resistance x10     Thoracic rot      x5 ea + x5 t/s side bend x5 ea L lat stretch thoracic ext x10 thoracic ext x5    Open book x10 1/2 kneel position on foam  thoracic ext in standing x8 unilat OH // bar    Seated thoracic ext x10 + rot thoracic ext seated over bolster + rot x10    Dowel OH thoracic rot + sidebend x6 ea    Open book 1/2 kneel x8 ea thoracic ext seated over bolster + rot x10    Open book 1/2 kneel x8 ea    L lat stretch standing x5 10" BL lat stertch standing // bar x10    Thoracic ext seated over bolster + rotation x10    Standing rotation against cubby wall x10 ea BL lat stretch standing // bar x10    Supine thoracic ext  x10 + rot   Cervical ext SNAGs NV     x5 5'            Lumbar flexion  x8                Lumbar side bend  x8 R                Aerobic   lvl 10 6min RPE 5-6/10 Lvl 6 recumbent RPE 4/10 5min              Sled push   10plates + 2 67DM 35TV 2x2 10 plates + 68XR 51RD x4, x2              Ther Activity                                                      Gait Training                                                      Modalities

## 2023-07-25 NOTE — PROGRESS NOTES
Occupational Therapy Daily Note:    Today's date: 2023  Patient name: Raina Clark  : 1970  MRN: [id number]  Referring provider: Leni Ballard DO   Dx:   Encounter Diagnoses   Name Primary? • History of concussion Yes   • Memory problem    • Anxiety and depression    • PTSD (post-traumatic stress disorder)        Subjective: "Im having a very bad day."    Objective: Pt engaged in skilled OT treatment session with focus on fxnl cognition, short term memory, healthy coping education and leisure pursuits to increase engagement, endurance, tolerance, and independence with daily ADL and IADL tasks. CPT Code Minutes                                           Task Details        Therapeutic Activity  Pt came for outpatient services upset regarding a banking/money situation happening in his life. Pt presented with a flat affect and disengaged during the session. Pt with decreased processing and observable cognititve overload. Discussed implementation of anxiety/stress management strategies/techniques to decrease his stress. Pt had not used any of his strategies prior to the session. Pt engaged in 5 minutes of box breathing in order to decrease anxiety and stress. Pt reported his mantra is not effective for him at this time. Pt reported that he is stuck replaying negative thoughts to himself. Discussed the placement of his mantras on his new journal for increased use. Discussed adding something into his day that will make him happy and that he can look forward to; pt with mod/max difficulty coming up with something that will make him happy. Pt cued to recall the list generated in a prior session. Pt to work on grounding himself after today's session. Habit Tracker:   Pt provided with habit tracker.  Pt identified 6 daily habits; pt to continue to add to habit tracker and place in new bullet journal.     RTW:   Pt wanted to discuss the modifications/accomodations he needs to go return back to work. Discussed with pt the feasibility of him being able to manage multiple tasks along with stress/anxiety symptoms. Discussed that OT does not recommend that he return back to work yet due to his decreased ability to mange anxiety/stress, as well as tasks that require multi-tasking. Pt to see Neuro PT after OT session; Pt engaged in a 5 minute grounding meditation at the end of the session to decrease cognitive overload. Neuro Re-Ed               Therapeutic Exercise               Homework   6/22/23  Pt to utilize calendar to note the following through the week:   Decreased concentration/motivation when trying to engage in a task   fatigue levels   Emotional responses   Self-care activities   UPDATE: Pt obtained bullet journal; SLP working on.     6/8/2023  Continue to add meditation into weekly schedule 3-4x/week           Self Care           Assessment: Tolerated treatment well. Pt presenting with max cognitive overload and decreased processing due to high emotional responses. Check in with pt regarding use of "self-care" check-in alarms. Patient would benefit from continued skilled OT. Plan: Continued skilled OT per POC          INTERVENTION COMMENTS:  Diagnosis: History of concussion [Z87.820]  Precautions:   Insurance: Payor: Gilson Syed / Plan: Gilson Syed PPO / Product Type: PPO /   Visits: 15  PN due 7/20/23

## 2023-07-26 ENCOUNTER — OFFICE VISIT (OUTPATIENT)
Dept: SPEECH THERAPY | Facility: CLINIC | Age: 53
End: 2023-07-26
Payer: COMMERCIAL

## 2023-07-26 ENCOUNTER — OFFICE VISIT (OUTPATIENT)
Dept: PHYSICAL THERAPY | Facility: CLINIC | Age: 53
End: 2023-07-26
Payer: COMMERCIAL

## 2023-07-26 DIAGNOSIS — R41.3 MEMORY PROBLEM: ICD-10-CM

## 2023-07-26 DIAGNOSIS — M76.31 ILIOTIBIAL BAND SYNDROME, RIGHT LEG: Primary | ICD-10-CM

## 2023-07-26 DIAGNOSIS — Z87.820 HISTORY OF CONCUSSION: Primary | ICD-10-CM

## 2023-07-26 DIAGNOSIS — R41.841 COGNITIVE COMMUNICATION DEFICIT: ICD-10-CM

## 2023-07-26 PROCEDURE — 92507 TX SP LANG VOICE COMM INDIV: CPT

## 2023-07-26 PROCEDURE — 97140 MANUAL THERAPY 1/> REGIONS: CPT

## 2023-07-26 PROCEDURE — 97112 NEUROMUSCULAR REEDUCATION: CPT

## 2023-07-26 PROCEDURE — 97110 THERAPEUTIC EXERCISES: CPT

## 2023-07-26 NOTE — PROGRESS NOTES
Reevaluation    Today's date: 2023  Patient’s name: Benjamin aSndhu  : 1970  MRN: [id number]  Safety measures:   Referring provider: Endy Soares DO    Encounter Diagnosis     ICD-10-CM    1. History of concussion  Z87.820       2. Memory problem  R41.3       3. Cognitive communication deficit  R41. 841           Visit tracking:  10    Subjective/Behavioral:  - Pleasant/Cooperative        Objective/Assessment:  Completed structured activities with patient to target goals below. Results as stated below. : Completed FAVRE - Making schedule with 100% accuracy. Patient was able to analyze, interpret and organize steps and multiple events/ tasks into priority and place into time order with 100% accuracy. Short-term goals:       Complete executive function task list.  : Provided list. Patient circled most of tasks that he has difficulty with with high priorities being: ACHIEVED- Patient feels has improved in taking down phone messages, adjusting well to changes in plans and changes in routines and when unforeseen event occurs, tracking down and locating information when needed. Patient also feels has improved in distraction by noises, activity, sights and completing tasks with multiple steps, underestimating time needed to complete tasks, trouble organizing tasks, planning ahead and putting brakes on his actions. Patient feels the same on areas of : recovering quickly after a set back, changing the mood quickly, easily distracted, reminded to complete the task, starting tasks at last minute, trouble remembering things, distractions via media, losing keys, cell phone, [place]. NEW GOAL: Patient will continue to decrease symptoms on executive function lists.            Complete falls, risk, harm assessment. 23: Completed. ACHIEVED      Patient will be educated on word finding strategies (i.e., circumlocution) for improved generative naming and verbal expression skills.   6/15: Provided word retrieval strategies, expressed understanding. DISCONTINUE GOAL     Patient will complete complex auditory attention processing tasks (e.g., sentence unscramble, ranking numbers/words, etc.) in increased distractible environment to improve working memory with 80% accuracy. 6/23: Working memory tasks: word order 3 words: 100%, 4 words increased time and occasional rep 100% Able to also complete reverse order at 3 and 4 words, immediate recall of 1st column answer after completing distracting column/activity: 63% activity, increased to 100% with minimal cue. Patient with difficulty / frustrated with this task, cued for him to use stress reduction techniques. 7/17: Recall of first column answer recall today with earlier activity presented on 6/23- attained 100% accuracy. ACHIEVED.     Patient will facilitate planning by completing thought organization tasks (e.g., sequencing, deduction puzzles, etc.) with 80% accuracy to facilitate increased executive functioning, working memory, problem solving, and processing skills. 6/19: Completed organizational tasks given information: 100% easier /scheduling tasks and higher level tasks with min cues- 100% accuracy. Provided more for homework. ACHIEVED     Patient will complete auditory immediate and short term memory tasks to 80% accuracy to facilitate increased ability to retell narratives and recall information within functional living environment. 7/17: Recall of % of details following short paragraphs. Independently recalled all events from earlier today. ACHIEVED     To target mental manipulation and working memory, patient will participate in word finding activity (i.e., anagrams) with 80% accuracy. 6/15: Completed anagrams with 80% accuracy. Provided packet for home. Also, started work with opposites and synonyms, completed with increased time - 80% accuracy.  ACHIEVED     Patient will answer questions regarding story read aloud in quiet and distractible environment with 80% accuracy to facilitate improved auditory comprehension and recall. 7/17: % recall of short paragraphs  7/24: Answering questions after being read a long paragraph: Shannan Nevarez paragraph: 80% accuracy. ACHIEVED     Patient will complete reading comprehension tasks (e.g., answering questions, following complex written directions, etc.) to 80% accuracy to facilitate carryover of comprehension of functional reading materials. 7/17: Answered questions following reading of paragraph, 100% accuracy. ACHIEVED     Patient will demonstrate divided attention by responding to multiple tasks or details within tasks at the same time with min cues in a distracting environment with 80% accuracy. 7/24: Jocelin Tinsley: Planning events: Initially scored [address], earning 3/5 points because patient with decreased attention, able to perfect answer with min cues. Able to attend and focus and complete rest of questions with good accuracy, 90% occasional min cues. Ready to progress to next task, Scheduling Tasks. Will check with OT to see if they are writing out accomodations for work. 7/26: Completed FAVRE - Making schedule with 100% accuracy. ACHIEVED, CONTINUE GOAL.     Patient will complete semi complex & complex tasks of increasing difficulty with multiple steps with fading cues to increase organization and accuracy with 80% accuracy. 7/24: Patient set up bullet journal for personal use and organizing ideas and thoughts and plans accordingly and effectively, suggested to use a full page for daily rapid log and planning and also to work on prioritizing. CONTINUE     Patient will express understanding of ways to maximize attention/focus, problem solving and recall in work place tasks with independence. 6/6: Educated on ways to maximize attention and focus, provided handout. Patient expressed understanding.  ? [employer] indira. 6/19: Provided lists of apps, workbooks, games to maximize attention/focus, word retrieval.  CONTINUE WITH USE OF APPS.       Patient will be educated on the use of internal and external memory aids and compensatory strategies to maximize attention/focus with 80% accuracy to facilitate increased recall of routine, personal information, and recent events. 6/15: Provided information on helpful indira for anxiety, insight timer - body scan relaxation, sleep and timer meditation assistance. 6/29: Patient very angry, upset, crying initial part of session. Unable to problem solve for redirection, use positive self talk. Ultimately, patient agreed to going for a walk / boxed breathing, calmed down. Then, I put on a body scan meditation for patient for 20 minutes. Patient able to sustain attention/focus and complete breathing throughout this task x 20 minutes. Encouraged patient to utilize these techniques when feeling slightly agitated, tense. 7/12: Provided list of memory strategies to promote attention/focusand recall. Encouraged patient to utilize morning steady routine of meditations, out loud as doing things and to obtain/ start using bullet journal in helping organize information and duties in a time efficient and organized manner. ACHIEVED        Long Term     Patient will complete cognitive-linguistic therapy that addresses patient's specific deficits in processing speed, short-term working memory, attention to detail, monitoring, sequencing, and organization skills, with instruction, to alleviate effects of executive functioning disorder deficits by discharge. CONTINUE     Patient will complete higher-level expressive language tasks (e.g., word definitions, idioms, synonym/antonyms, etc) with 80% accuracy to improve functional communication skills by discharge.  DISCONTINUE     Patient will improve ability to facilitate cognitive function and communication skills including use of compensatory strategies in a variety of functional living tasks to improve quality of like and to maximize level of independence. CONTINUE     Impressions/Recommendations     Impressions:   -Patient completed x 10 sessions of cognitive services. Patient  with mild cognitive deficits (mild) in areas of recall and executive functioning as a result of vast history of many concussions. Patient's psychological status and reactions/ emotional regulation issues negatively impact cognition. Patient making gains as noted above and would continue to benefit from cognitive services.       Recommendations:  -Patient would benefit from outpatient skilled Speech Therapy services: Cognitive-linguistic therapy     -Frequency: 1-2x weekly  -Duration: 6-8 weeks     -Intervention certification from: 8/67/8426  -Intervention certification to: 2/13/9570     -Intervention comments:   [place], unable to do taxes, strategies for word retrieval, recall, attention, anxiety/stress, coping mechanisms, workplace tasks list and ways to maximize cognition, executive function list, word retrieval tasks, executive function tasks of increased difficulty in increased distractible environments, deduction puzzles, memory tasks with increased distraction, ? Melodic intonation therapy            Plan:  -Continue with current plan of care. [place], unable to do taxes,  recall, anxiety/stress, coping mechanisms, workplace tasks list and ways to maximize cognition, executive function list, word retrieval tasks, executive function tasks of increased difficulty in increased distractible environments, memory tasks with increased distraction, ?  IM therapy

## 2023-07-26 NOTE — PROGRESS NOTES
Daily Note     Today's date: 2023  Patient name: Bette Castanon  : 1970  MRN: [id number]  Referring provider: Wisam Lopez DO  Dx:   Encounter Diagnosis     ICD-10-CM    1. Iliotibial band syndrome, right leg  M76.31                      Subjective: Patient reports that Sherri Tejada continues to improve with PT.     Objective: See treatment diary below      Assessment: Pt tolerated stationary bike better than previous visits. Continue to address tissue restriction/proximal hip strength, as able. Plan: Continue per plan of care.       Precautions: cluster HA, PTSD     POC EXPIRES On:  23  PRECAUTIONS:  None  CO-MORBIDITES:  Concussion  PERSONAL FACTORS:  None      Manuals HEP 7/10 7/12 7/17 7/19 7/24 7/26   TFM R distal ITB supine  20 IASTM 10' IASTM/cupping 20' 15 15 15   Patellar mobs R  5 5' 5' 5 5 5                       Neuro Re-Ed       Bridges  5"x20 5" 20 nv 5" 20 5" 20 5" 20   S/L abduction R  20 20 nv 20 20 20             FWD Lunge          LAT Lunge                              Ther Ex      Bike  L5  10 min L5 12' L5 12' L5 12' L5 12' L5 12'   Supine strap R ITB stretch  20"x5 20" 5 5x20" c cups 5" 20 5" 20 5" 20   Supine LTR to L only  10"x10 10" 10 10x10" c cups 10x10 10" 10 10" 10   Cross leg stretch to L in supine  20"x5 20" 5 nv 20" 5 20" 5 20" 5                                           Ther Activity                          Gait Training                          Modalities

## 2023-07-27 ENCOUNTER — OFFICE VISIT (OUTPATIENT)
Facility: CLINIC | Age: 53
End: 2023-07-27
Payer: COMMERCIAL

## 2023-07-27 DIAGNOSIS — Z87.820 HISTORY OF CONCUSSION: Primary | ICD-10-CM

## 2023-07-27 DIAGNOSIS — F32.A ANXIETY AND DEPRESSION: ICD-10-CM

## 2023-07-27 DIAGNOSIS — F43.10 PTSD (POST-TRAUMATIC STRESS DISORDER): ICD-10-CM

## 2023-07-27 DIAGNOSIS — F41.9 ANXIETY AND DEPRESSION: ICD-10-CM

## 2023-07-27 DIAGNOSIS — R41.3 MEMORY PROBLEM: ICD-10-CM

## 2023-07-27 PROCEDURE — 97530 THERAPEUTIC ACTIVITIES: CPT

## 2023-07-27 NOTE — PROGRESS NOTES
Occupational Therapy Daily Note:    Today's date: 2023  Patient name: Benjamin Sandhu  : 1970  MRN: [id number]  Referring provider: Endy Soares DO   Dx:   Encounter Diagnoses   Name Primary? • History of concussion Yes   • Memory problem    • Anxiety and depression    • PTSD (post-traumatic stress disorder)        Subjective: "I cant make any decisions." when pt was asked what we could do during today's session to make him feel better. Objective: Pt engaged in skilled OT treatment session with focus on fxnl cognition, short term memory, healthy coping education and leisure pursuits to increase engagement, endurance, tolerance, and independence with daily ADL and IADL tasks. CPT Code Minutes                                           Task Details        Therapeutic Activity  Pt came for outpatient OT services presenting with continued flat affect and sense of disengagement due to the increased life stressors. Mental Health Management:   Pt with P mental health management. Pt with increased signs of depression; pt reporting a lack of motivation. Pt reported that his mantra continues to not be affective at this time and he has reverted back to saying negative/hate mantras. Discussed the following mental health strategies:   1. Saying 5 things he is grateful for in the AM and PM  2. Continuing to repeat positive mantras  3. Guided meditation to relax his body   4. Using his sleep indira to calm his mind   5. Continued participation in leisure activities    Pt reports that he has been disengaged from his [employer] journal. During the session, pt engaged with his [employer] journal. Pt added space in his journal for the rest of the month. Pt added section for daily gratitude to his [employer] journal. Pt placed habit tracker into [employer] journal for improved use of the tracker. Pt to continue adding to his daily habit tracker.      RTW:   Pt engaged in RTW simulation focused on organization and multi-tasking. Pt instructed to complete a cognitive scavenger hunt to retrieve information from specified therapists within the clinic. Pt with G+ communication and ability to retrieve information. Pt with G task organization. Pt was unable to finish the simulation due to time; plan  continue next session. Neuro Re-Ed               Therapeutic Exercise               Homework   6/22/23  Pt to utilize calendar to note the following through the week:   Decreased concentration/motivation when trying to engage in a task   fatigue levels   Emotional responses   Self-care activities   UPDATE: Pt obtained bullet journal; SLP working on.     6/8/2023  Continue to add meditation into weekly schedule 3-4x/week           Self Care           Assessment: Tolerated treatment well. Pt presenting with mod cognitive overload and flat affect. Pt presenting with symptoms of depression; worked on developing mental health strategies with pt. Pt responded well to strategies and G engagement in discussion. Patient would benefit from continued skilled OT. Plan: Continued skilled OT per POC          INTERVENTION COMMENTS:  Diagnosis: History of concussion [Z87.820]  Precautions:   Insurance: Payor: Rose Mary Dumas / Plan: Rose Mary Dumas PPO / Product Type: PPO /   Visits: 16  PN due 7/20/23

## 2023-07-28 ENCOUNTER — TELEPHONE (OUTPATIENT)
Dept: SLEEP CENTER | Facility: CLINIC | Age: 53
End: 2023-07-28

## 2023-07-28 ENCOUNTER — OFFICE VISIT (OUTPATIENT)
Facility: CLINIC | Age: 53
End: 2023-07-28
Payer: COMMERCIAL

## 2023-07-28 DIAGNOSIS — G44.86 CERVICOGENIC HEADACHE: ICD-10-CM

## 2023-07-28 DIAGNOSIS — R26.89 IMBALANCE: ICD-10-CM

## 2023-07-28 DIAGNOSIS — Z87.820 HISTORY OF CONCUSSION: Primary | ICD-10-CM

## 2023-07-28 PROCEDURE — 97110 THERAPEUTIC EXERCISES: CPT

## 2023-07-28 PROCEDURE — 97140 MANUAL THERAPY 1/> REGIONS: CPT

## 2023-07-28 NOTE — TELEPHONE ENCOUNTER
Sleep study results were provided to patient by ordering physician Dr. Melanie Gamboa on 7/24/23. Dr. Melanie Gamboa requests follow up with sleep specialist.     Called patient to schedule sleep consultation and left call back message. Provided office number in message.

## 2023-07-28 NOTE — PROGRESS NOTES
Daily Note     Today's date: 2023  Patient name: Arvin Gallegos  : 1970  MRN: [id number]  Referring provider: Claudine Callaway DO  Dx:   Encounter Diagnosis     ICD-10-CM    1. History of concussion  Z87.820       2. Imbalance  R26.89       3. Cervicogenic headache  G44.86                      Subjective: Continues w/ HA, L neck and shoulder blade pain. Does note improvements since starting therapy. Has referral for orthopedic specialist for L shoulder now. Objective: See treatment diary below      Assessment: Tolerated treatment well. Continues w/ soft tissue restrictions and responses well to manual techniques; reports decreased pain and increased motion post. Largely independent w/ active exercises. Patient demonstrated fatigue post treatment, exhibited good technique with therapeutic exercises and would benefit from continued PT      Plan: Continue per plan of care.       Precautions: cluster HA, PTSD     POC EXPIRES On:  23  PRECAUTIONS:  None  CO-MORBIDITES:  Concussion  PERSONAL FACTORS:  None      Manuals 7/28 6/6 6/8 6/13 6/15 6/20 6/22 6/26 6/27 6/29 7/10 7/14 7/19 7/21 7/25   SOR VR AF AF AF  AF 6min AF 6min AF 6min AF 8min AF 8min AF 8min AF 10min AF 10min AF 10min AF 10min   Manual cervical traction VR AF AF AF  AF AF AF AF AF AF AF AF AF AF   1st rib mob VR                 PA mobs (C3-C5) VR        AF AF AF AF AF AF AF   MWM L rot VR                 Unilateral PA pressure L side VR                 Periscapular TrP VR     AF 4min total L scap AF 5min total L scap AF 5min total L scap AF 5min L scap   AF 5min L scap + MET for LS AF5min L scap + MET for LS AF8min L + MET for LS AF 8min L + MET for LS   Neuro Re-Ed          FGA, mCTSIB        Walking over hurdles w/ foam between                  Tandem amb                  Walking on foam w/ cone taps                  Walking over hidden objects                  HT amb  20ft x4, x2 bwd Foam beam 8 laps               Foam  Mod tandem EC 30" x1 ea Cone  cog task x4 Foam tandem cone  4 laps              Agility ladder     Fwd 2 laps                 Ther Ex                  Pt education                  UT stretch                  LS stretch      scap hold 20" x3 Scap hold 30" x3 scap hold 30" x3 Into flexion 20" x3 ea gentle ROM         Cervical rotation SNAGs x10 5' ea x5 ea 5'    x8 ea 5' x8 5' ea x8 5' ea   x10 5' ea x10 5' ea x10 5' ea x10 5' ea  x10 5' ea   Scap protraction/retraction      x8 ea x8 x8          Extension/flexion cervical Towel resistance x20      Towel resist x20 ea    Towel resistance x20 Towel resistance x20 Towel resistance x10     Thoracic rot thoracic ext seated over bolster + rot x10    Dowel OH thoracic rot + sidebend x6 ea    Open book 1/2 kneel x8 ea     x5 ea + x5 t/s side bend x5 ea L lat stretch thoracic ext x10 thoracic ext x5    Open book x10 1/2 kneel position on foam  thoracic ext in standing x8 unilat OH // bar    Seated thoracic ext x10 + rot thoracic ext seated over bolster + rot x10    Dowel OH thoracic rot + sidebend x6 ea    Open book 1/2 kneel x8 ea thoracic ext seated over bolster + rot x10    Open book 1/2 kneel x8 ea    L lat stretch standing x5 10" BL lat stertch standing // bar x10    Thoracic ext seated over bolster + rotation x10    Standing rotation against cubby wall x10 ea BL lat stretch standing // bar x10    Supine thoracic ext  x10 + rot   Cervical ext SNAGs      x5 5'            Lumbar flexion  x8                Lumbar side bend  x8 R                Aerobic   lvl 10 6min RPE 5-6/10 Lvl 6 recumbent RPE 4/10 5min              Sled push   10plates + 2 44NA 99UN 2x2 10 plates + 41ZJ 63CI x4, x2              Ther Activity                                                      Gait Training                                                      Modalities

## 2023-07-31 ENCOUNTER — OFFICE VISIT (OUTPATIENT)
Facility: CLINIC | Age: 53
End: 2023-07-31
Payer: COMMERCIAL

## 2023-07-31 DIAGNOSIS — Z87.820 HISTORY OF CONCUSSION: Primary | ICD-10-CM

## 2023-07-31 DIAGNOSIS — G44.86 CERVICOGENIC HEADACHE: ICD-10-CM

## 2023-07-31 DIAGNOSIS — M76.31 ILIOTIBIAL BAND SYNDROME, RIGHT LEG: ICD-10-CM

## 2023-07-31 DIAGNOSIS — R26.89 IMBALANCE: ICD-10-CM

## 2023-07-31 PROCEDURE — 97110 THERAPEUTIC EXERCISES: CPT

## 2023-07-31 PROCEDURE — 97140 MANUAL THERAPY 1/> REGIONS: CPT

## 2023-07-31 NOTE — PROGRESS NOTES
Daily Note     Today's date: 2023  Patient name: Krystal Rosado  : 1970  MRN: [id number]  Referring provider: Arley Chavez DO  Dx:   Encounter Diagnosis     ICD-10-CM    1. History of concussion  Z87.820       2. Imbalance  R26.89       3. Cervicogenic headache  G44.86       4. Iliotibial band syndrome, right leg  M76.31                      Subjective: Patient noted feeling off balance more over the weekend noted happened three or four times over the weekend. Patient noted  headache R side patient noted went away post stretching and performing exercises. Patient also noted "panic attacks consistently worse for the past couple of weeks."        Objective: See treatment diary below      Assessment: Tolerated treatment fair. Patient was able to perform listed exercises. STM/ TrP helped to decrease fascia restrictions in periscap. Patient noted decreased pressure L side cervical region post manual treatment. Patient would benefit from continued PT      Plan: Continue per plan of care.       Precautions: cluster HA, PTSD     POC EXPIRES On:  23  PRECAUTIONS:  None  CO-MORBIDITES:  Concussion  PERSONAL FACTORS:  None      Manuals 7/28 7/31  6/13 6/15 6/20 6/22 6/26 6/27 6/29 7/10 7/14 7/19 7/21 7/25   SOR VR AC   AF  AF 6min AF 6min AF 6min AF 8min AF 8min AF 8min AF 10min AF 10min AF 10min AF 10min   Manual cervical traction VR AC  AF  AF AF AF AF AF AF AF AF AF AF   1st rib mob VR                 PA mobs (C3-C5) VR        AF AF AF AF AF AF AF   MWM L rot VR                 Unilateral PA pressure L side VR                 Periscapular TrP VR AC TrP/ STM 10'    AF 4min total L scap AF 5min total L scap AF 5min total L scap AF 5min L scap   AF 5min L scap + MET for LS AF5min L scap + MET for LS AF8min L + MET for LS AF 8min L + MET for LS   Neuro Re-Ed          FGA, mCTSIB        Walking over hurdles w/ foam between                  Tandem amb                  Walking on foam w/ cone taps Walking over hidden objects                  HT amb                  Foam    Foam tandem cone  4 laps              Agility ladder     Fwd 2 laps                 Ther Ex                  Pt education                  UT stretch                  LS stretch      scap hold 20" x3 Scap hold 30" x3 scap hold 30" x3 Into flexion 20" x3 ea gentle ROM         Cervical rotation SNAGs x10 5' ea x10 5'' ea    x8 ea 5' x8 5' ea x8 5' ea   x10 5' ea x10 5' ea x10 5' ea x10 5' ea  x10 5' ea   Scap protraction/retraction      x8 ea x8 x8          Extension/flexion cervical Towel resistance x20      Towel resist x20 ea    Towel resistance x20 Towel resistance x20 Towel resistance x10     Thoracic rot thoracic ext seated over bolster + rot x10    Dowel OH thoracic rot + sidebend x6 ea    Open book 1/2 kneel x8 ea BL lat stretch standing // bar x10    Supine thoracic ext  x10 + rot       x5 ea + x5 t/s side bend x5 ea L lat stretch thoracic ext x10 thoracic ext x5    Open book x10 1/2 kneel position on foam  thoracic ext in standing x8 unilat OH // bar    Seated thoracic ext x10 + rot thoracic ext seated over bolster + rot x10    Dowel OH thoracic rot + sidebend x6 ea    Open book 1/2 kneel x8 ea thoracic ext seated over bolster + rot x10    Open book 1/2 kneel x8 ea    L lat stretch standing x5 10" BL lat stertch standing // bar x10    Thoracic ext seated over bolster + rotation x10    Standing rotation against cubby wall x10 ea BL lat stretch standing // bar x10    Supine thoracic ext  x10 + rot   Cervical ext SNAGs      x5 5'            Lumbar flexion                  Lumbar side bend                  Aerobic    Lvl 6 recumbent RPE 4/10 5min              Sled push    10 plates + 06VV 97BT x4, x2              Ther Activity                                                      Gait Training                                                      Modalities

## 2023-08-02 ENCOUNTER — OFFICE VISIT (OUTPATIENT)
Dept: SPEECH THERAPY | Facility: CLINIC | Age: 53
End: 2023-08-02
Payer: COMMERCIAL

## 2023-08-02 ENCOUNTER — OFFICE VISIT (OUTPATIENT)
Facility: CLINIC | Age: 53
End: 2023-08-02
Payer: COMMERCIAL

## 2023-08-02 DIAGNOSIS — R41.841 COGNITIVE COMMUNICATION DEFICIT: ICD-10-CM

## 2023-08-02 DIAGNOSIS — F43.10 PTSD (POST-TRAUMATIC STRESS DISORDER): ICD-10-CM

## 2023-08-02 DIAGNOSIS — Z87.820 HISTORY OF CONCUSSION: Primary | ICD-10-CM

## 2023-08-02 DIAGNOSIS — Z87.820 HISTORY OF CONCUSSION: ICD-10-CM

## 2023-08-02 DIAGNOSIS — F32.A ANXIETY AND DEPRESSION: ICD-10-CM

## 2023-08-02 DIAGNOSIS — R41.3 MEMORY PROBLEM: ICD-10-CM

## 2023-08-02 DIAGNOSIS — F41.9 ANXIETY AND DEPRESSION: ICD-10-CM

## 2023-08-02 DIAGNOSIS — R41.3 MEMORY PROBLEM: Primary | ICD-10-CM

## 2023-08-02 PROCEDURE — 97530 THERAPEUTIC ACTIVITIES: CPT

## 2023-08-02 PROCEDURE — 92507 TX SP LANG VOICE COMM INDIV: CPT
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
## 2023-08-03 ENCOUNTER — OFFICE VISIT (OUTPATIENT)
Dept: PHYSICAL THERAPY | Facility: CLINIC | Age: 53
End: 2023-08-03
Payer: COMMERCIAL

## 2023-08-03 DIAGNOSIS — M76.31 ILIOTIBIAL BAND SYNDROME, RIGHT LEG: Primary | ICD-10-CM

## 2023-08-03 PROCEDURE — 97110 THERAPEUTIC EXERCISES: CPT

## 2023-08-03 PROCEDURE — 97112 NEUROMUSCULAR REEDUCATION: CPT

## 2023-08-03 PROCEDURE — 97140 MANUAL THERAPY 1/> REGIONS: CPT

## 2023-08-03 NOTE — PROGRESS NOTES
Daily Note     Today's date: 8/3/2023  Patient name: Kymberly Chavez  : 1970  MRN: [id number]  Referring provider: Sherry Erazo DO  Dx:   Encounter Diagnosis     ICD-10-CM    1. Iliotibial band syndrome, right leg  M76.31                      Subjective: Patient reports that Mya Perez continues to improve with PT.     Objective: See treatment diary below      Assessment: Pt Patella ROM has improved significantly compared to previous visits. Plan: Continue per plan of care.       Precautions: cluster HA, PTSD     POC EXPIRES On:  23  PRECAUTIONS:  None  CO-MORBIDITES:  Concussion  PERSONAL FACTORS:  None      Manuals HEP 7/19 7/24 7/26 8/3   TFM R distal ITB supine  15 15 15 15   Patellar mobs R  5 5 5 5 rom                   Neuro Re-Ed     Bridges  5" 20 5" 20 5" 20 5" 20   S/L abduction R  20 20 20 20           FWD Lunge        LAT Lunge                        Ther Ex    Bike  L5 12' L5 12' L5 12' L5 12   Supine strap R ITB stretch  5" 20 5" 20 5" 20 5" 20   Supine LTR to L only  10x10 10" 10 10" 10 10" 10   Cross leg stretch to L in supine  20" 5 20" 5 20" 5 20" 5                                   Ther Activity                    Gait Training                    Modalities

## 2023-08-04 ENCOUNTER — OFFICE VISIT (OUTPATIENT)
Dept: SPEECH THERAPY | Facility: CLINIC | Age: 53
End: 2023-08-04
Payer: COMMERCIAL

## 2023-08-04 ENCOUNTER — OFFICE VISIT (OUTPATIENT)
Facility: CLINIC | Age: 53
End: 2023-08-04
Payer: COMMERCIAL

## 2023-08-04 DIAGNOSIS — F41.9 ANXIETY AND DEPRESSION: ICD-10-CM

## 2023-08-04 DIAGNOSIS — F32.A ANXIETY AND DEPRESSION: ICD-10-CM

## 2023-08-04 DIAGNOSIS — R41.3 MEMORY PROBLEM: Primary | ICD-10-CM

## 2023-08-04 DIAGNOSIS — R41.3 MEMORY PROBLEM: ICD-10-CM

## 2023-08-04 DIAGNOSIS — Z87.820 HISTORY OF CONCUSSION: Primary | ICD-10-CM

## 2023-08-04 DIAGNOSIS — R41.841 COGNITIVE COMMUNICATION DEFICIT: ICD-10-CM

## 2023-08-04 DIAGNOSIS — Z87.820 HISTORY OF CONCUSSION: ICD-10-CM

## 2023-08-04 DIAGNOSIS — F43.10 PTSD (POST-TRAUMATIC STRESS DISORDER): ICD-10-CM

## 2023-08-04 PROCEDURE — 92507 TX SP LANG VOICE COMM INDIV: CPT

## 2023-08-04 PROCEDURE — 97530 THERAPEUTIC ACTIVITIES: CPT
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
## 2023-08-07 ENCOUNTER — OFFICE VISIT (OUTPATIENT)
Dept: PHYSICAL THERAPY | Facility: CLINIC | Age: 53
End: 2023-08-07
Payer: COMMERCIAL

## 2023-08-07 DIAGNOSIS — M76.31 ILIOTIBIAL BAND SYNDROME, RIGHT LEG: Primary | ICD-10-CM

## 2023-08-07 PROCEDURE — 97112 NEUROMUSCULAR REEDUCATION: CPT

## 2023-08-07 PROCEDURE — 97110 THERAPEUTIC EXERCISES: CPT

## 2023-08-07 PROCEDURE — 97140 MANUAL THERAPY 1/> REGIONS: CPT

## 2023-08-07 NOTE — PROGRESS NOTES
Daily Note     Today's date: 2023  Patient name: Marcel Pinto  : 1970  MRN: [id number]  Referring provider: Mike Servin DO  Dx:   Encounter Diagnosis     ICD-10-CM    1. Iliotibial band syndrome, right leg  M76.31                      Subjective: Patient reports that Miky Elm continues to improve with PT.     Objective: See treatment diary below  FOTO given ()      Assessment: Pt Patella ROM has improved significantly compared to previous visits. Plan: Continue per plan of care.       Precautions: cluster HA, PTSD     POC EXPIRES On:  23  PRECAUTIONS:  None  CO-MORBIDITES:  Concussion  PERSONAL FACTORS:  None      Manuals HEP 7/26 8/3 8/7   TFM R distal ITB supine  15 15 15   Patellar mobs R  5 5 rom 5rom                 Neuro Re-Ed     Bridges  5" 20 5" 20 5" 20   S/L abduction R  20 20 20          FWD Lunge       LAT Lunge                     Ther Ex    Bike  L5 12' L5 12 L5 12   Supine strap R ITB stretch  5" 20 5" 20 5" 20   Supine LTR to L only  10" 10 10" 10 10" 10   Cross leg stretch to L in supine  20" 5 20" 5 20" 5                               Ther Activity                  Gait Training                  Modalities

## 2023-08-08 ENCOUNTER — APPOINTMENT (OUTPATIENT)
Dept: SPEECH THERAPY | Facility: CLINIC | Age: 53
End: 2023-08-08
Payer: COMMERCIAL

## 2023-08-08 ENCOUNTER — APPOINTMENT (OUTPATIENT)
Dept: OCCUPATIONAL THERAPY | Facility: CLINIC | Age: 53
End: 2023-08-08
Payer: COMMERCIAL

## 2023-08-09 ENCOUNTER — OFFICE VISIT (OUTPATIENT)
Dept: PHYSICAL THERAPY | Facility: CLINIC | Age: 53
End: 2023-08-09
Payer: COMMERCIAL

## 2023-08-09 ENCOUNTER — OFFICE VISIT (OUTPATIENT)
Dept: SPEECH THERAPY | Facility: CLINIC | Age: 53
End: 2023-08-09
Payer: COMMERCIAL

## 2023-08-09 ENCOUNTER — OFFICE VISIT (OUTPATIENT)
Facility: CLINIC | Age: 53
End: 2023-08-09
Payer: COMMERCIAL

## 2023-08-09 DIAGNOSIS — F43.10 PTSD (POST-TRAUMATIC STRESS DISORDER): ICD-10-CM

## 2023-08-09 DIAGNOSIS — M76.31 ILIOTIBIAL BAND SYNDROME, RIGHT LEG: Primary | ICD-10-CM

## 2023-08-09 DIAGNOSIS — R41.841 COGNITIVE COMMUNICATION DEFICIT: ICD-10-CM

## 2023-08-09 DIAGNOSIS — Z87.820 HISTORY OF CONCUSSION: ICD-10-CM

## 2023-08-09 DIAGNOSIS — R41.3 MEMORY PROBLEM: ICD-10-CM

## 2023-08-09 DIAGNOSIS — F32.A ANXIETY AND DEPRESSION: ICD-10-CM

## 2023-08-09 DIAGNOSIS — F41.9 ANXIETY AND DEPRESSION: ICD-10-CM

## 2023-08-09 DIAGNOSIS — Z87.820 HISTORY OF CONCUSSION: Primary | ICD-10-CM

## 2023-08-09 DIAGNOSIS — R41.3 MEMORY PROBLEM: Primary | ICD-10-CM

## 2023-08-09 PROCEDURE — 92507 TX SP LANG VOICE COMM INDIV: CPT

## 2023-08-09 PROCEDURE — 97140 MANUAL THERAPY 1/> REGIONS: CPT

## 2023-08-09 PROCEDURE — 97110 THERAPEUTIC EXERCISES: CPT

## 2023-08-09 PROCEDURE — 97112 NEUROMUSCULAR REEDUCATION: CPT

## 2023-08-09 PROCEDURE — 97530 THERAPEUTIC ACTIVITIES: CPT

## 2023-08-09 NOTE — PROGRESS NOTES
Daily Note     Today's date: 2023  Patient name: Collin Patel  : 1970  MRN: [id number]  Referring provider: Anurag Arias DO  Dx:   Encounter Diagnosis     ICD-10-CM    1. Iliotibial band syndrome, right leg  M76.31                      Subjective: Patient reports that Theola Jared continues to improve with PT.     Objective: See treatment diary below  FOTO given ()      Assessment: Pt Patella ROM has improved significantly compared to previous visits. However, pt is now experiencing L ITBand pain. Plan: Continue per plan of care.       Precautions: cluster HA, PTSD     POC EXPIRES On:  23  PRECAUTIONS:  None  CO-MORBIDITES:  Concussion  PERSONAL FACTORS:  None      Manuals HEP 7/26 8/3 8/7 8/9   TFM R distal ITB supine  15 15 15 15 +L   Patellar mobs R  5 5 rom 5rom 5rom                   Neuro Re-Ed      Bridges  5" 20 5" 20 5" 20 5" 20   S/L abduction R  20 20 20 20           FWD Lunge        LAT Lunge                        Ther Ex     Bike  L5 12' L5 12 L5 12 L5 12   Supine strap R ITB stretch  5" 20 5" 20 5" 20 5" 20   Supine LTR to L only  10" 10 10" 10 10" 10 10" 10   Cross leg stretch to L in supine  20" 5 20" 5 20" 5 20" 5                                   Ther Activity                     Gait Training                     Modalities
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
## 2023-08-09 NOTE — PROGRESS NOTES
Occupational Therapy Daily Note:    Today's date: 2023  Patient name: Gayathri Mckeon  : 1970  MRN: [id number]  Referring provider: Derk Paget, DO   Dx:   Encounter Diagnoses   Name Primary? • Memory problem Yes   • History of concussion    • Anxiety and depression    • PTSD (post-traumatic stress disorder)        Subjective: "I don't feel like I have control but I have better recognition and use of strategies"   "I feel more hopeful"     Objective: Pt engaged in skilled OT treatment session with focus on fxnl cognition, short term memory, healthy coping education and leisure pursuits to increase engagement, endurance, tolerance, and independence with daily ADL and IADL tasks. CPT Code Minutes                                           Task Details        Therapeutic Activity  Mental Health Management to prepare for RTW:    1. Pt reporting that, when he was in a situation that was overwhelming and would have caused increased anxiety and depression, he was able to identify his needs, communicate effectively his needs with those he was with, set boundaries and adhered to his boundaries, which enabled him to effectively handle a situation that normally would have caused his emotions to go out of control. 2. Discussed strategies he is currently trying to implement:  -altering morning routine, spending more time with his puppy in the beginning of the day to increase state of peace and calm  -Breathing to control his anxiety when feels anxiety levels rising  -Adjusting sleep schedule. Pt reporting he has weaned his goat to being milked just in the am; no longer has late pm milking time; working on adjusting his body to sleep earlier  -identifying times when his anxiety is rising, recognizing the situation, using breathing to lessen the panic, and trying to motivate himself to continue engagement in next functional task instead of letting the anxiety control his thoughts.      3. Discussed progression towards goals. Pt stating that he does not feel like he has any control over his anxiety or the panic attacks but does feel he is doing better at recognition and using his strategies to manage the effects of the anxiety and depression and better influence the outcomes. Working on consistency with actions after recognition. 4. Discussed symptoms. Pt stating he has had a tension headache for the last few days, level 5/10; pt also with increased life stressors over the last few days, having lost his sister a couple days ago. Pt reporting continued brain fog, continued depression and anxiety. Pt stating that while the depression thoughts still dominate, he feels more hopeful; feels he is better managing his impulses; has better use of strategies to manage the panic attacks; is able to calm down quicker after a panic attack and re-engage in strategies to manage the outcome. Reports decreased outbursts. Pt also reporting a regression with auto-stim behaviors such as rocking, leg shaking, fidgeting    5. Pt reporting he is covered by short term disability until Nov 13, which will allow much needed time to cont to progress towards RTW. Neuro Re-Ed               Therapeutic Exercise               HEP  Developed the following mental health strategies:   1. Saying 5 things he is grateful for in the AM and PM  2. Continuing to repeat positive mantras  3. Guided meditation to relax his body   4. Using his sleep indira to calm his mind   5. Continued participation in leisure activities    6. Bullet journaling    7. Meditation 3-4x/ week       6/22/23  Pt to utilize calendar to note the following through the week:   Decreased concentration/motivation when trying to engage in a task   fatigue levels   Emotional responses   Self-care activities             Self Care           Assessment: Tolerated treatment well.  Pt with improving use of strategies to manage anxiety and panic attacks, noted increased consistency allowing for improved management of behavior and thoughts. Patient would benefit from continued skilled OT.     Plan: Continued skilled OT per POC          INTERVENTION COMMENTS:  Diagnosis: History of concussion [Z87.820]  Precautions: PTSD, anxiety   Insurance: Payor: [employer] De La [employer]re / Plan: [employer] De La Torre PPO / Product Type: PPO /   Visits: 19  PN due 8/15/23

## 2023-08-10 ENCOUNTER — OFFICE VISIT (OUTPATIENT)
Facility: CLINIC | Age: 53
End: 2023-08-10
Payer: COMMERCIAL

## 2023-08-10 DIAGNOSIS — G44.86 CERVICOGENIC HEADACHE: ICD-10-CM

## 2023-08-10 DIAGNOSIS — Z87.820 HISTORY OF CONCUSSION: Primary | ICD-10-CM

## 2023-08-10 DIAGNOSIS — R26.89 IMBALANCE: ICD-10-CM

## 2023-08-10 PROCEDURE — 97112 NEUROMUSCULAR REEDUCATION: CPT

## 2023-08-10 PROCEDURE — 97110 THERAPEUTIC EXERCISES: CPT

## 2023-08-10 PROCEDURE — 97140 MANUAL THERAPY 1/> REGIONS: CPT

## 2023-08-10 NOTE — PROGRESS NOTES
Daily Note     Today's date: 8/10/2023  Patient name: Nina Sparrow  : 1970  MRN: [id number]  Referring provider: Aurora Kim DO  Dx:   Encounter Diagnosis     ICD-10-CM    1. History of concussion  Z87.820       2. Imbalance  R26.89       3. Cervicogenic headache  G44.86           Start Time: 1230  Stop Time: 1315  Total time in clinic (min): 45 minutes    Subjective: More imbalance recently and heaaches due to inc stress anxiety. Had situation where walking around path to the L having more path deviation, not so much dizziness. Has not been performing his usual stretching and exercise routine because everything going on. Objective: See treatment diary below      Assessment: Fogginess slightly lifted in head, feels better mobility wise after cervical manuals. Showing better cervical and thoracic mobility post stretches. He does exhibit staggering + mild path deviation with head movement at todays visit with reports of increased fogginess. Instructed patient to return to usual stretching routine and add in walking with head movement on flat surface. Patient would benefit from continued PT      Plan: Continue per plan of care. cervical manuals. Re assess nv.      Precautions: cluster HA, PTSD     POC EXPIRES On:  23  PRECAUTIONS:  None  CO-MORBIDITES:  Concussion  PERSONAL FACTORS:  None      Manuals 7/28 7/31 8/10 6/13 6/15 6/20 6/22 6/26 6/27 6/29 7/10 7/14 7/19 7/21 7/25   SOR VR AC  AF AF  AF 6min AF 6min AF 6min AF 8min AF 8min AF 8min AF 10min AF 10min AF 10min AF 10min   Manual cervical traction VR AC AF AF  AF AF AF AF AF AF AF AF AF AF   1st rib mob VR                 PA mobs (C3-C5) VR        AF AF AF AF AF AF AF   MWM L rot VR                 Unilateral PA pressure L side VR                 Periscapular TrP VR AC TrP/ STM 10' AF 5'   AF 4min total L scap AF 5min total L scap AF 5min total L scap AF 5min L scap   AF 5min L scap + MET for LS AF5min L scap + MET for LS AF8min L + MET for LS AF 8min L + MET for LS   Neuro Re-Ed          FGA, mCTSIB        Walking over hurdles w/ foam between                  Tandem amb                  Walking on foam w/ cone taps                  Walking over hidden objects                  HT amb   20ft x6               Foam    Foam tandem cone  4 laps              Agility ladder     Fwd 2 laps                 Ther Ex                  Pt education                  UT stretch                  LS stretch   30" x2   scap hold 20" x3 Scap hold 30" x3 scap hold 30" x3 Into flexion 20" x3 ea gentle ROM         Cervical rotation SNAGs x10 5' ea x10 5'' ea x10 5'    x8 ea 5' x8 5' ea x8 5' ea   x10 5' ea x10 5' ea x10 5' ea x10 5' ea  x10 5' ea   Scap protraction/retraction      x8 ea x8 x8          Extension/flexion cervical Towel resistance x20      Towel resist x20 ea    Towel resistance x20 Towel resistance x20 Towel resistance x10     Thoracic rot thoracic ext seated over bolster + rot x10    Dowel OH thoracic rot + sidebend x6 ea    Open book 1/2 kneel x8 ea BL lat stretch standing // bar x10    Supine thoracic ext  x10 + rot    Supine x10 thoracic ext over bolster    BL lat stretch standing // bar x10   x5 ea + x5 t/s side bend x5 ea L lat stretch thoracic ext x10 thoracic ext x5    Open book x10 1/2 kneel position on foam  thoracic ext in standing x8 unilat OH // bar    Seated thoracic ext x10 + rot thoracic ext seated over bolster + rot x10    Dowel OH thoracic rot + sidebend x6 ea    Open book 1/2 kneel x8 ea thoracic ext seated over bolster + rot x10    Open book 1/2 kneel x8 ea    L lat stretch standing x5 10" BL lat stertch standing // bar x10    Thoracic ext seated over bolster + rotation x10    Standing rotation against cubby wall x10 ea BL lat stretch standing // bar x10    Supine thoracic ext  x10 + rot   Cervical ext SNAGs      x5 5'            Lumbar flexion                  Lumbar side bend                  Aerobic    Lvl 6 recumbent RPE 4/10 5min              Sled push    10 plates + 42EN 37JL x4, x2              Ther Activity                                                      Gait Training                                                      Modalities

## 2023-08-11 ENCOUNTER — OFFICE VISIT (OUTPATIENT)
Facility: CLINIC | Age: 53
End: 2023-08-11
Payer: COMMERCIAL

## 2023-08-11 ENCOUNTER — EVALUATION (OUTPATIENT)
Dept: PHYSICAL THERAPY | Facility: CLINIC | Age: 53
End: 2023-08-11
Payer: COMMERCIAL

## 2023-08-11 ENCOUNTER — OFFICE VISIT (OUTPATIENT)
Dept: SPEECH THERAPY | Facility: CLINIC | Age: 53
End: 2023-08-11
Payer: COMMERCIAL

## 2023-08-11 DIAGNOSIS — F41.9 ANXIETY AND DEPRESSION: ICD-10-CM

## 2023-08-11 DIAGNOSIS — R41.3 MEMORY PROBLEM: ICD-10-CM

## 2023-08-11 DIAGNOSIS — R41.841 COGNITIVE COMMUNICATION DEFICIT: ICD-10-CM

## 2023-08-11 DIAGNOSIS — Z87.820 HISTORY OF CONCUSSION: ICD-10-CM

## 2023-08-11 DIAGNOSIS — R41.3 MEMORY PROBLEM: Primary | ICD-10-CM

## 2023-08-11 DIAGNOSIS — F43.10 PTSD (POST-TRAUMATIC STRESS DISORDER): ICD-10-CM

## 2023-08-11 DIAGNOSIS — F32.A ANXIETY AND DEPRESSION: ICD-10-CM

## 2023-08-11 DIAGNOSIS — Z87.820 HISTORY OF CONCUSSION: Primary | ICD-10-CM

## 2023-08-11 DIAGNOSIS — M76.31 ILIOTIBIAL BAND SYNDROME, RIGHT LEG: Primary | ICD-10-CM

## 2023-08-11 PROCEDURE — 92507 TX SP LANG VOICE COMM INDIV: CPT

## 2023-08-11 PROCEDURE — 97110 THERAPEUTIC EXERCISES: CPT | Performed by: PHYSICAL THERAPIST

## 2023-08-11 PROCEDURE — 97530 THERAPEUTIC ACTIVITIES: CPT

## 2023-08-11 PROCEDURE — 97140 MANUAL THERAPY 1/> REGIONS: CPT | Performed by: PHYSICAL THERAPIST

## 2023-08-11 NOTE — PROGRESS NOTES
PT Re-evaluation    Today's date: 2023  Patient name: Krystal Rosado  : 1970  MRN: [id number]  Referring provider: Arley Chavez DO  Dx:   Encounter Diagnosis     ICD-10-CM    1. Iliotibial band syndrome, right leg  M76.31                      Assessment  Assessment details: Krystal Rosado is a 47 y.o. male who presented to outpatient physical therapy at [facility] with complaints of R posterior hip, lateral thigh, knee and leg pain. He presented with decreased R ITB flexibility, poor R LE balance, poor R patellar mobility, decreased tolerance to activity and decreased functional mobility due to Iliotibial band syndrome, right leg (primary encounter diagnosis). His progression has been very good in PT with much better L LE flexibility and endurance. He was only able to ambulate x 100 yards when he started PT and can now walk 3x as far without stopping. He will continue to benefit from skilled PT services in order to address these deficits and reach maximum level of function. Thank you for the referral!  Impairments: abnormal or restricted ROM, activity intolerance, impaired balance, impaired physical strength, lacks appropriate home exercise program and pain with function  Barriers to therapy: None  Understanding of Dx/Px/POC: good  Goals  ST. Independent with HEP in 2 weeks - Met  2. Increase R ITB flexibility to WNL in 3 weeks - Mostly Met     LT. Achieve FOTO score of 54/100 in 8 weeks - Mostly Met  2. Able to descend steps and hills without R LE pain in 8 weeks - Met  3. Good R patellar mobility in 6 weeks - Mostly Met  4.   No L LE tenderness in 6 weeks (23)    Plan  Patient would benefit from: skilled PT  Planned modality interventions: cryotherapy, TENS and thermotherapy: hydrocollator packs  Planned therapy interventions: abdominal trunk stabilization, ADL retraining, balance/weight bearing training, body mechanics training, flexibility, functional ROM exercises, home exercise program, joint mobilization, manual therapy, neuromuscular re-education, postural training, strengthening, stretching, therapeutic activities and therapeutic exercise  Frequency: 2x week  Duration in weeks: 6  Treatment plan discussed with: patient        Subjective Evaluation    History of Present Illness  Mechanism of injury: Pt reports having R posterior hip, lateral thigh, knee and lateral leg pain since about 30 years ago that started to worsen about 2 months ago. He has hx of a concussion that still limits him. Pt has anxiety. On disability for 3 months from desk work. Was unable to walk down gradual hills or down steps due to pain but that is much better now. Was sleeping poorly due to pain but also sleeping well now. Still having some difficulty walking for long periods of time. Pt is obese. Recurrent probem    Quality of life: fair    Patient Goals  Patient goals for therapy: return to sport/leisure activities, increased motion, decreased pain, return to work and improved balance    Pain  Current pain rating: 3  At best pain ratin  At worst pain ratin  Quality: tight and dull ache  Aggravating factors: walking and stair climbing  Progression: improved    Social Support  Steps to enter house: yes  Stairs in house: yes   Lives in: multiple-level home  Lives with: significant other and young children      Diagnostic Tests  No diagnostic tests performed  Treatments  Previous treatment: physical therapy (accupuncture)  Current treatment: physical therapy        Objective     Palpation     Right   Hypertonic in the TFL. Tenderness of the TFL. Right Hip Palpation Comments   TFL: Slight distally aspect. Tenderness     Right Hip   No tenderness in the greater trochanter.      Active Range of Motion   Left Hip   Normal active range of motion    Right Hip   Normal active range of motion  Left Knee   Normal active range of motion    Right Knee   Normal active range of motion    Mobility   Patellar Mobility:     Right Knee   Hypomobile: medial, lateral, superior and inferior     Strength/Myotome Testing     Left Hip   Normal muscle strength    Right Hip   Normal muscle strength    Left Knee   Normal strength    Right Knee   Normal strength    Ambulation     Ambulation: Level Surfaces   Ambulation with assistive device: independent    Ambulation: Stairs   Ascend stairs: independent  Pattern: reciprocal  Railings: one rail  Descend stairs: independent  Pattern: non-reciprocal  Railings: one rail  Curbs: independent    Observational Gait   Gait: within functional limits       POC EXPIRES On:  9/22/23  PRECAUTIONS:  None  CO-MORBIDITES:  Concussion  PERSONAL FACTORS:  None      Manuals HEP 7/26 8/3 8/7 8/9 8/11   TFM R distal ITB supine  15 15 15 15 +L 12'   Patellar mobs R  5 5 rom 5rom 5rom 5 min                     Neuro Re-Ed       Bridges  5" 20 5" 20 5" 20 5" 20 5" 20   S/L abduction R  20 20 20 20 20            FWD Lunge         LAT Lunge                           Ther Ex      Bike  L5 12' L5 12 L5 12 L5 12 L5 12'   Supine strap R ITB stretch 6/16 5" 20 5" 20 5" 20 5" 20 5" 20   Supine LTR to L only 6/16 10" 10 10" 10 10" 10 10" 10 10" 10   Cross leg stretch to L in supine  20" 5 20" 5 20" 5 20" 5 20" 5   Figure 4 hip stretch L/R                                    Ther Activity                        Gait Training                        Modalities

## 2023-08-11 NOTE — PROGRESS NOTES
Daily Speech Treatment Note    Today's date: 2023  Patient’s name: Benjamin Sandhu  : 1970  MRN: [id number]  Safety measures:   Referring provider: Endy Soares DO    No diagnosis found. Visit trackin    Subjective/Behavioral:  - " Patient very clear minded today and clearly expressing his thoughts and status in regards to his physical and mental well being    Objective/Assessment:  Completed structured activities with patient to target goals below. Results as stated below. Short-term goals:       Patient will continue to decrease symptoms on executive function lists.              Patient will demonstrate divided attention by responding to multiple tasks or details within tasks at the same time with min cues in a distracting environment with 80% accuracy. : Making a decision: Analysis of phone call conversation on paper and made decision with good accuracy 100% accuracy. Patient showed game on indira called [employer]. Patient noted hidden areas of cognitive strengths and able to complete this indira, 100% accuracy. : Building a Case- Patient read a whole page case and wrote a paragraph to state his case, completing and answering all questions with 100% accuracy.       Patient will complete semi complex & complex tasks of increasing difficulty with multiple steps with fading cues to increase organization and accuracy with 80% accuracy. 8/2: Patient given time this AM to organize bullet journal for the rest of the week, patient completed independently.      Patient will express understanding of ways to maximize attention/focus, problem solving and recall in work place tasks with independence with apps.  82: Reviewed goals with patient. Patient noted his continual issues with reducing internal  Distractions and reducing negative self talk.   Provided patient large packet of countering negative self talk and homework for patient is to write counteractive positive statements after reading packet. Provided list of positive affirmations. Encouragement and support provided. 8/4: Used active listening with patient and educated on negative thought work and process of affirmations. Patient's negative thoughts appears to be negatively impacting patients daily activities on continual daily basis. Patient will continue to work on this.      Patient will be educated on the use of internal and external memory aids and compensatory strategies to maximize attention/focus with 80% accuracy to facilitate increased recall of routine, personal information, and recent events.  8/9: Patient noting he was implementing self advocacy during recent moments of distress. Supported patient in this.       Long Term     Patient will complete cognitive-linguistic therapy that addresses patient's specific deficits in processing speed, short-term working memory, attention to detail, monitoring, sequencing, and organization skills, with instruction, to alleviate effects of executive functioning disorder deficits by discharge.         Patient will improve ability to facilitate cognitive function and communication skills including use of compensatory strategies in a variety of functional living tasks to improve quality of like and to maximize level of independence.     Plan:  -Continue with current plan of care. Val Bernabe, unable to do taxes, strategies for word retrieval, recall, attention, anxiety/stress, coping mechanisms, workplace tasks list and ways to maximize cognition, memory tasks with increased distraction, ?  Melodic intonation therapy

## 2023-08-11 NOTE — PROGRESS NOTES
Occupational Therapy Daily Note:    Today's date: 2023  Patient name: Su Fuentes  : 1970  MRN: [id number]  Referring provider: Vasu Perea DO   Dx:   Encounter Diagnoses   Name Primary? • Memory problem Yes   • History of concussion    • Anxiety and depression    • PTSD (post-traumatic stress disorder)        Subjective: "It has been a crazy couple of days."     Objective: Pt engaged in skilled OT treatment session with focus on fxnl cognition, short term memory, healthy coping education and leisure pursuits to increase engagement, endurance, tolerance, and independence with daily ADL and IADL tasks. CPT Code Minutes                                           Task Details        Therapeutic Activity  Pt engaged in session focused on anxiety/stress management and RTW simulation. Pt reporting that since last session he has had continued tension headaches due to on going life stressors. Pt reporting he decided to engage in self-care by resting and relaxing his body. Pt engaged in 5 minutes of stretching/meditation to calm his mind/body. Pt reporting the self-check in alarms are continuing to benefit him. Pt reports the alarms are going off frequently enough that he doesn't dismiss them; pt has alarms set for every 3 hours. Pt with G implementation of anxiety management strategies when alarms occur. Discussed changing alarms to times when pt is more free once he establishes more of a daily routine. Pt reporting due to life stressor he has not been engaging with his bullet journal. Pt was able to I implement a plan for when he will set aside time to organize his bullet journal. Pt given handout for habit tracking; discussed use of handout for beginning to build habits into his daily routine; pt with G understanding. RTW Simulation:   Pt continued with RTW simulation from previous session.  Pt retrieved specified information from the Internet; pt with G task organization and completion. Pt reported the retrieved information to specified therapists within the clinic; pt with G communication. Neuro Re-Ed               Therapeutic Exercise               HEP  Developed the following mental health strategies:   1. Saying 5 things he is grateful for in the AM and PM  2. Continuing to repeat positive mantras  3. Guided meditation to relax his body   4. Using his sleep indira to calm his mind   5. Continued participation in leisure activities    6. Bullet journaling    7. Meditation 3-4x/ week       6/22/23  Pt to utilize calendar to note the following through the week:   Decreased concentration/motivation when trying to engage in a task   fatigue levels   Emotional responses   Self-care activities             Self Care           Assessment: Tolerated treatment well. Pt has increased awareness of his thoughts and feelings; Pt improving with anxiety/stress management strategy implementation. Pt with ÁNGELA completion of RTW simulation. Patient would benefit from continued skilled OT.     Plan: Continued skilled OT per POC          INTERVENTION COMMENTS:  Diagnosis: History of concussion [Z87.820]  Precautions: PTSD, anxiety   Insurance: Payor: [employer] / Plan: [employer] PPO / Product Type: PPO /   Visits: 20  PN due 8/15/23

## 2023-08-16 ENCOUNTER — APPOINTMENT (OUTPATIENT)
Dept: SPEECH THERAPY | Facility: CLINIC | Age: 53
End: 2023-08-16
Payer: COMMERCIAL

## 2023-08-16 ENCOUNTER — OFFICE VISIT (OUTPATIENT)
Dept: PHYSICAL THERAPY | Facility: CLINIC | Age: 53
End: 2023-08-16
Payer: COMMERCIAL

## 2023-08-16 ENCOUNTER — OFFICE VISIT (OUTPATIENT)
Facility: CLINIC | Age: 53
End: 2023-08-16
Payer: COMMERCIAL

## 2023-08-16 DIAGNOSIS — R26.89 IMBALANCE: ICD-10-CM

## 2023-08-16 DIAGNOSIS — G44.86 CERVICOGENIC HEADACHE: ICD-10-CM

## 2023-08-16 DIAGNOSIS — Z87.820 HISTORY OF CONCUSSION: Primary | ICD-10-CM

## 2023-08-16 DIAGNOSIS — R41.3 MEMORY PROBLEM: Primary | ICD-10-CM

## 2023-08-16 DIAGNOSIS — M76.31 ILIOTIBIAL BAND SYNDROME, RIGHT LEG: Primary | ICD-10-CM

## 2023-08-16 DIAGNOSIS — F43.10 PTSD (POST-TRAUMATIC STRESS DISORDER): ICD-10-CM

## 2023-08-16 DIAGNOSIS — F32.A ANXIETY AND DEPRESSION: ICD-10-CM

## 2023-08-16 DIAGNOSIS — F41.9 ANXIETY AND DEPRESSION: ICD-10-CM

## 2023-08-16 DIAGNOSIS — Z87.820 HISTORY OF CONCUSSION: ICD-10-CM

## 2023-08-16 PROCEDURE — 97110 THERAPEUTIC EXERCISES: CPT

## 2023-08-16 PROCEDURE — 97140 MANUAL THERAPY 1/> REGIONS: CPT

## 2023-08-16 PROCEDURE — 97530 THERAPEUTIC ACTIVITIES: CPT

## 2023-08-16 PROCEDURE — 97164 PT RE-EVAL EST PLAN CARE: CPT
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
## 2023-08-16 NOTE — PROGRESS NOTES
Daily Note     Today's date: 2023  Patient name: Roge Callejas  : 1970  MRN: [id number]  Referring provider: Angeles Catalan DO  Dx:   Encounter Diagnosis     ICD-10-CM    1. Iliotibial band syndrome, right leg  M76.31                      Subjective: Pt states L side is bothering him more. Objective: See treatment diary below  FOTO given (_____)      Assessment: Pt presented to outpatient physical therapy at [facility] with complaints of R posterior hip, lateral thigh, knee and leg pain. He presented with decreased R ITB flexibility, poor R LE balance, poor R patellar mobility, decreased tolerance to activity and decreased functional mobility due to Iliotibial band syndrome, right leg (primary encounter diagnosis). His progression has been very good in PT with much better L LE flexibility and endurance. He was only able to ambulate x 100 yards when he started PT and can now walk 3x as far without stopping. He will continue to benefit from skilled PT services in order to address these deficits and reach maximum level of function. Pt performed below TE with great tolerance, pt responded well to new stretches well.     Plan: Continue plan of care     POC EXPIRES On:  23  PRECAUTIONS:  None  CO-MORBIDITES:  Concussion  PERSONAL FACTORS:  None      Manuals HEP    TFM R distal ITB supine  15 +L 12' 15' +L   Patellar mobs R  5rom 5 min 5min                 Neuro Re-Ed       Bridges  5" 20 5" 20 5" 20   S/L abduction R  20 20 20 +L          FWD Lunge       LAT Lunge                     Ther Ex      Bike  L5 12 L5 12' L5 12'   Supine strap R ITB stretch  5" 20 5" 20 manual   Supine LTR to L only  10" 10 10" 10 10" 10   Cross leg stretch to L in supine  20" 5 20" 5 20" 5   Seated Low Back flexion    10" 5   Seated HS stretch L/R    10" 5 ea                 Ther Activity                    Gait Training                    Modalities

## 2023-08-16 NOTE — PROGRESS NOTES
PT Re-Evaluation /progress note    Today's date: 2023  Patient name: Meseret Banegas  : 1970  MRN: [id number]  Referring provider: Komal Nathan DO  Dx:   Encounter Diagnosis     ICD-10-CM    1. History of concussion  Z87.820       2. Imbalance  R26.89       3. Cervicogenic headache  G44.86           Start Time: 1100  Stop Time: 1150  Total time in clinic (min): 50 minutes    Assessment  Assessment details: Pt is a 47 y.o. male who presents to OP PT for IE following referral for h/o concussion. Melissa Rodriguez reports improvement in his headaches over the past month. He feels he has regressed in his cervical ROM + pain due to the stressors in his life and he has not been able to perform his HEP as much as he would prefer. Upon testing, he continues to show cervical stiffness and pain with movement. He is also continuing to experience imbalance with head movement while ambulating, this is improved while static and when bending over where previously he he would have imbalance. New goals added to goals he is progressing toward to reflect updated plan of care. Impairments: abnormal or restricted ROM, abnormal movement, activity intolerance, impaired balance, impaired physical strength, lacks appropriate home exercise program, pain with function and poor posture     Symptom irritability: moderateUnderstanding of Dx/Px/POC: good   Prognosis: good    Goals  STGs (to be achieved w/i 2-4 weeks)  1. Will obtain referral for ortho PT for R hip/knee pain. - met    LTGs (to be achieved within 6-8 weeks)   1. Pt will be I with HEP at d/c to promote PT carry-over. 2. Pt will meet FOTO predicted score. 3. Pt will report improvements in "head pressure", no greater than 2/10 1-2x week. - met  4. Pt will report increased confidence in balance abilities. - progressing    In 4 weeks pt will:  1. Improve FGA to 29/30 showing improved balance with head turns -  progressing  2.  Demonstrate improved cervical ROM by at least 25% to assist with functional tasks - partially met, progressing    In 4 weeks pt will:  1. Improve cervical pain at worst by 2 points to help QoL. 2. Report improved path deviation during walks with head movement      Plan  Patient would benefit from: skilled physical therapy  Planned therapy interventions: joint mobilization, therapeutic activities, therapeutic exercise, neuromuscular re-education and gait training  Frequency: 2x week  Duration in weeks: 8  Plan of Care beginning date: 8/16/2023  Plan of Care expiration date: 10/15/2023  Treatment plan discussed with: patient (SO)        Subjective Evaluation    History of Present Illness  Mechanism of injury: Pt with history of multiple concussions/ head traumas from various mechanisms, with the most recent being approx 6 years ago, when he had a coughing spell, leaned forward, passed out and fell on his head. Pt reports cont progression of symptoms over the last 5 years, with worsening symptoms over the last 6 months. Symptoms include: imbalance, L hand tremor, numbness in R 3 digit of hand, sensitivity to light, and dull pressure of L frontal and parietal regions of head. Also c/o non-PT related symptoms including confusion, memory and executive function deficits, and increased anxiety. Pt is no longer driving 2/2 symptoms. HA/head pressure is worsened by lack of sleep, working on computer, and hard/new tasks. H/o neck and B shoulder issues including subluxing at shoulders. Balance issues began several years ago and are worsening. Nearly falls at least once a week. Denies neuropathy or LE radiculopathy. Does have R hip and knee issues and has to stop and stretch IT several times during 1.5 mile walks he does on several times per week. 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 8/16: Neck + shoulder pain. Headaches not as prominent, sometimes pressure, but can manipulate it/massage to improve headaches.  Stiff painful neck, feels 2 steps fwd 1 step back. More brain fog and "blurry". Path deviation when walking especially when turning his head. Not as many issues balance wise but is still frustrated with his walking. Can traverse slopes. No issues getting in/out car balance wise. Feels PT is helpful for his headaches and neck pain. Pain  Current pain rating: 3  At best pain rating: 3  At worst pain ratin  Location: L posterior neck  Quality: throbbing, sharp and pressure          Objective     Palpation   Left   Hypertonic in the cervical paraspinals, scalenes, sternocleidomastoid, suboccipitals and upper trapezius. Tenderness of the cervical paraspinals, scalenes, sternocleidomastoid, suboccipitals and upper trapezius. Trigger point to scalenes and upper trapezius. Right   Hypertonic in the cervical paraspinals, scalenes, sternocleidomastoid, suboccipitals and upper trapezius. Tenderness of the cervical paraspinals, scalenes, sternocleidomastoid, suboccipitals and upper trapezius. Trigger point to scalenes.      Active Range of Motion   Cervical/Thoracic Spine     Normal active range of motion    Tests   Cervical     Left   Positive cervical flexion-rotation test .   Neuro Exam:     Oculomotor exam   Oculomotor ROM: WNL  Smooth pursuits: within normal limits  Vertical saccades: hypometric  Horizontal saccades: hypometric   Convergence: normal  Dynamic head: VOR testing: blurriness of target @ 1 Hz    Coordination   Heel to shin: left WNL and right WNL  Finger to nose: left dysmetria and right dysmetria (R>L)  Rapid alternating movements: UE WNL and LE impaired         Cervical Range of Motion     Flexion 50% limited    Extension 50% limited     Left Right   Lateral Flexion     Rotation 25% limited 25% limited                       Outcome measure IE    FGA  28/30 deviation with L head movement   Kay 50/56                      MCTSIB Number of Seconds    Feet Together, Eyes Open 30 30   Feet Together, Eyes Closed 30 30 R drift   Feet Together, Eyes Open Foam 30 30   Feet Together, Eyes Closed Foam 30 w/ minimal sway 30 R drift mod sway            POC EXPIRES On:  8/11/23  PRECAUTIONS:  None  CO-MORBIDITES:  Concussion  PERSONAL FACTORS:  None      Manuals 7/28 7/31 8/10 8/16 6/15 6/20 6/22 6/26 6/27 6/29 7/10 7/14 7/19 7/21 7/25   SOR VR AC  AF AF  AF 6min AF 6min AF 6min AF 8min AF 8min AF 8min AF 10min AF 10min AF 10min AF 10min   Manual cervical traction VR AC AF AF  AF AF AF AF AF AF AF AF AF AF   1st rib mob VR                 PA mobs (C3-C5) VR        AF AF AF AF AF AF AF   MWM L rot VR                 Unilateral PA pressure L side VR                 Periscapular TrP VR AC TrP/ STM 10' AF 5' AF 5'  AF 4min total L scap AF 5min total L scap AF 5min total L scap AF 5min L scap   AF 5min L scap + MET for LS AF5min L scap + MET for LS AF8min L + MET for LS AF 8min L + MET for LS   Neuro Re-Ed          FGA, mCTSIB        Walking over hurdles w/ foam between                  Tandem amb                  Walking on foam w/ cone taps                  Walking over hidden objects                  HT amb   20ft x6               Foam                  Agility ladder     Fwd 2 laps                 Ther Ex                  Pt education                  UT stretch                  LS stretch   30" x2   scap hold 20" x3 Scap hold 30" x3 scap hold 30" x3 Into flexion 20" x3 ea gentle ROM         Cervical rotation SNAGs x10 5' ea x10 5'' ea x10 5'  x10 5'  x8 ea 5' x8 5' ea x8 5' ea   x10 5' ea x10 5' ea x10 5' ea x10 5' ea  x10 5' ea   Scap protraction/retraction      x8 ea x8 x8          Extension/flexion cervical Towel resistance x20      Towel resist x20 ea    Towel resistance x20 Towel resistance x20 Towel resistance x10     Thoracic rot thoracic ext seated over bolster + rot x10    Dowel OH thoracic rot + sidebend x6 ea    Open book 1/2 kneel x8 ea BL lat stretch standing // bar x10    Supine thoracic ext  x10 + rot    Supine x10 thoracic

## 2023-08-18 ENCOUNTER — OFFICE VISIT (OUTPATIENT)
Facility: CLINIC | Age: 53
End: 2023-08-18
Payer: COMMERCIAL

## 2023-08-18 ENCOUNTER — OFFICE VISIT (OUTPATIENT)
Dept: SPEECH THERAPY | Facility: CLINIC | Age: 53
End: 2023-08-18
Payer: COMMERCIAL

## 2023-08-18 ENCOUNTER — OFFICE VISIT (OUTPATIENT)
Dept: PHYSICAL THERAPY | Facility: CLINIC | Age: 53
End: 2023-08-18
Payer: COMMERCIAL

## 2023-08-18 DIAGNOSIS — Z87.820 HISTORY OF CONCUSSION: Primary | ICD-10-CM

## 2023-08-18 DIAGNOSIS — R41.841 COGNITIVE COMMUNICATION DEFICIT: ICD-10-CM

## 2023-08-18 DIAGNOSIS — M76.31 ILIOTIBIAL BAND SYNDROME, RIGHT LEG: Primary | ICD-10-CM

## 2023-08-18 DIAGNOSIS — F43.10 PTSD (POST-TRAUMATIC STRESS DISORDER): ICD-10-CM

## 2023-08-18 DIAGNOSIS — R41.3 MEMORY PROBLEM: Primary | ICD-10-CM

## 2023-08-18 DIAGNOSIS — F41.9 ANXIETY AND DEPRESSION: ICD-10-CM

## 2023-08-18 DIAGNOSIS — Z87.820 HISTORY OF CONCUSSION: ICD-10-CM

## 2023-08-18 DIAGNOSIS — R41.3 MEMORY PROBLEM: ICD-10-CM

## 2023-08-18 DIAGNOSIS — F32.A ANXIETY AND DEPRESSION: ICD-10-CM

## 2023-08-18 PROCEDURE — 92507 TX SP LANG VOICE COMM INDIV: CPT

## 2023-08-18 PROCEDURE — 97140 MANUAL THERAPY 1/> REGIONS: CPT

## 2023-08-18 PROCEDURE — 97112 NEUROMUSCULAR REEDUCATION: CPT

## 2023-08-18 PROCEDURE — 97530 THERAPEUTIC ACTIVITIES: CPT

## 2023-08-18 PROCEDURE — 97110 THERAPEUTIC EXERCISES: CPT
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
## 2023-08-18 NOTE — PROGRESS NOTES
Daily Note     Today's date: 2023  Patient name: Su Fuentes  : 1970  MRN: [id number]  Referring provider: Vasu Perea DO  Dx:   Encounter Diagnosis     ICD-10-CM    1. Iliotibial band syndrome, right leg  M76.31                   Subjective: Pt states L side is still bothering him. Objective: See treatment diary below  FOTO given (_____)      Assessment: Pt presented to outpatient physical therapy at [facility] with complaints of R posterior hip, lateral thigh, knee and leg pain. He presented with decreased R ITB flexibility, poor R LE balance, poor R patellar mobility, decreased tolerance to activity and decreased functional mobility due to Iliotibial band syndrome, right leg (primary encounter diagnosis). His progression has been very good in PT with much better L LE flexibility and endurance. He was only able to ambulate x 100 yards when he started PT and can now walk 3x as far without stopping. He will continue to benefit from skilled PT services in order to address these deficits and reach maximum level of function. Pt tolerated stationary bike much better post manuals compared to previous visits.      Plan: Continue plan of care     POC EXPIRES On:  23  PRECAUTIONS:  None  CO-MORBIDITES:  Concussion  PERSONAL FACTORS:  None      Manuals HEP    TFM R distal ITB supine  15 +L 12' 15' +L 15' +L   Patellar mobs R  5rom 5 min 5min 5min                   Neuro Re-Ed        Bridges  5" 20 5" 20 5" 20 5" 20   S/L abduction R  20 20 20 +L 20+ L           FWD Lunge        LAT Lunge                        Ther Ex       Bike  L5 12 L5 12' L5 12' L5 12'   Supine strap R ITB stretch  5" 20 5" 20 manual manual   Supine LTR to L only  10" 10 10" 10 10" 10 10" 10   Cross leg stretch to L in supine  20" 5 20" 5 20" 5 20" 5   Seated Low Back flexion    10" 5 10" 5    Seated HS stretch L/R    10" 5 ea 10" 5 ea                   Ther Activity Gait Training                       Modalities

## 2023-08-21 ENCOUNTER — OFFICE VISIT (OUTPATIENT)
Facility: CLINIC | Age: 53
End: 2023-08-21
Payer: COMMERCIAL

## 2023-08-21 DIAGNOSIS — R26.89 IMBALANCE: ICD-10-CM

## 2023-08-21 DIAGNOSIS — G44.86 CERVICOGENIC HEADACHE: ICD-10-CM

## 2023-08-21 DIAGNOSIS — Z87.820 HISTORY OF CONCUSSION: Primary | ICD-10-CM

## 2023-08-21 PROCEDURE — 97140 MANUAL THERAPY 1/> REGIONS: CPT

## 2023-08-21 PROCEDURE — 97112 NEUROMUSCULAR REEDUCATION: CPT

## 2023-08-21 PROCEDURE — 97110 THERAPEUTIC EXERCISES: CPT

## 2023-08-21 NOTE — PROGRESS NOTES
Daily Note     Today's date: 2023  Patient name: Uzma Stallworth  : 1970  MRN: [id number]  Referring provider: Francesca Ventura DO  Dx:   Encounter Diagnosis     ICD-10-CM    1. History of concussion  Z87.820       2. Imbalance  R26.89       3. Cervicogenic headache  G44.86           Start Time: 1523  Stop Time: 3444  Total time in clinic (min): 41 minutes    Subjective: Neck has been more bothersome recently. Feels more stiff turning to the L.       Objective: See treatment diary below      Assessment:  Pt reports slightly improved neck stiffness post manuals. Some reproduction of dizziness or imbalance with optokinetic videos. Audible cavitation of cervical and thoracic spine with therapeutic exercises. Patient would benefit from continued PT to improve neck pain and headaches. Plan: Continue per plan of care. optokinetic training. Cervical manuals and postural exercises.       POC EXPIRES On:  23  PRECAUTIONS:  None  CO-MORBIDITES:  Concussion  PERSONAL FACTORS:  None      Manuals 7/28 7/31 8/10 8/16 8/21 6/20 6/22 6/26 6/27 6/29 7/10 7/14 7/19 7/21 7/25   SOR VR AC  AF AF AF AF 6min AF 6min AF 6min AF 8min AF 8min AF 8min AF 10min AF 10min AF 10min AF 10min   Manual cervical traction VR AC AF AF AF AF AF AF AF AF AF AF AF AF AF   1st rib mob VR                 PA mobs (C3-C5) VR        AF AF AF AF AF AF AF   MWM L rot VR                 Unilateral PA pressure L side VR                 Periscapular TrP VR AC TrP/ STM 10' AF 5' AF 5'  AF 4min total L scap AF 5min total L scap AF 5min total L scap AF 5min L scap   AF 5min L scap + MET for LS AF5min L scap + MET for LS AF8min L + MET for LS AF 8min L + MET for LS   Neuro Re-Ed          FGA, mCTSIB        Walking over hurdles w/ foam between                  Tandem amb                  Walking on foam w/ cone taps                  Walking over hidden objects                  HT amb   20ft x6               Foam                  Agility ladder Optokinetic training     Driving videos 10 min total             Ther Ex                  Pt education                  UT stretch                  LS stretch   30" x2   scap hold 20" x3 Scap hold 30" x3 scap hold 30" x3 Into flexion 20" x3 ea gentle ROM         Cervical rotation SNAGs x10 5' ea x10 5'' ea x10 5'  x10 5' x10 5'  x8 ea 5' x8 5' ea x8 5' ea   x10 5' ea x10 5' ea x10 5' ea x10 5' ea  x10 5' ea   Scap protraction/retraction      x8 ea x8 x8          Extension/flexion cervical Towel resistance x20    x10 5'   Towel resist x20 ea    Towel resistance x20 Towel resistance x20 Towel resistance x10     Thoracic rot thoracic ext seated over bolster + rot x10    Dowel OH thoracic rot + sidebend x6 ea    Open book 1/2 kneel x8 ea BL lat stretch standing // bar x10    Supine thoracic ext  x10 + rot    Supine x10 thoracic ext over bolster    BL lat stretch standing // bar x10 thoracic ext x10 supine over bolster Pec stretch 20" x3    Thoracic ext x10 supine over bolster x5 ea + x5 t/s side bend x5 ea L lat stretch thoracic ext x10 thoracic ext x5    Open book x10 1/2 kneel position on foam  thoracic ext in standing x8 unilat OH // bar    Seated thoracic ext x10 + rot thoracic ext seated over bolster + rot x10    Dowel OH thoracic rot + sidebend x6 ea    Open book 1/2 kneel x8 ea thoracic ext seated over bolster + rot x10    Open book 1/2 kneel x8 ea    L lat stretch standing x5 10" BL lat stertch standing // bar x10    Thoracic ext seated over bolster + rotation x10    Standing rotation against cubby wall x10 ea BL lat stretch standing // bar x10    Supine thoracic ext  x10 + rot   Cervical ext SNAGs      x5 5'            Lumbar flexion                  Lumbar side bend                  Aerobic                  Sled push                  Ther Activity                                                      Gait Training                                                      Modalities

## 2023-08-23 ENCOUNTER — OFFICE VISIT (OUTPATIENT)
Facility: CLINIC | Age: 53
End: 2023-08-23
Payer: COMMERCIAL

## 2023-08-23 ENCOUNTER — OFFICE VISIT (OUTPATIENT)
Dept: SPEECH THERAPY | Facility: CLINIC | Age: 53
End: 2023-08-23
Payer: COMMERCIAL

## 2023-08-23 ENCOUNTER — OFFICE VISIT (OUTPATIENT)
Dept: PHYSICAL THERAPY | Facility: CLINIC | Age: 53
End: 2023-08-23
Payer: COMMERCIAL

## 2023-08-23 DIAGNOSIS — R41.3 MEMORY PROBLEM: ICD-10-CM

## 2023-08-23 DIAGNOSIS — Z87.820 HISTORY OF CONCUSSION: ICD-10-CM

## 2023-08-23 DIAGNOSIS — F41.9 ANXIETY AND DEPRESSION: ICD-10-CM

## 2023-08-23 DIAGNOSIS — G44.86 CERVICOGENIC HEADACHE: ICD-10-CM

## 2023-08-23 DIAGNOSIS — F43.10 PTSD (POST-TRAUMATIC STRESS DISORDER): ICD-10-CM

## 2023-08-23 DIAGNOSIS — R41.841 COGNITIVE COMMUNICATION DEFICIT: ICD-10-CM

## 2023-08-23 DIAGNOSIS — R26.89 IMBALANCE: ICD-10-CM

## 2023-08-23 DIAGNOSIS — F32.A ANXIETY AND DEPRESSION: ICD-10-CM

## 2023-08-23 DIAGNOSIS — R41.3 MEMORY PROBLEM: Primary | ICD-10-CM

## 2023-08-23 DIAGNOSIS — M76.31 ILIOTIBIAL BAND SYNDROME, RIGHT LEG: Primary | ICD-10-CM

## 2023-08-23 DIAGNOSIS — Z87.820 HISTORY OF CONCUSSION: Primary | ICD-10-CM

## 2023-08-23 PROCEDURE — 97530 THERAPEUTIC ACTIVITIES: CPT

## 2023-08-23 PROCEDURE — 97140 MANUAL THERAPY 1/> REGIONS: CPT

## 2023-08-23 PROCEDURE — 97112 NEUROMUSCULAR REEDUCATION: CPT

## 2023-08-23 PROCEDURE — 97110 THERAPEUTIC EXERCISES: CPT

## 2023-08-23 PROCEDURE — 92507 TX SP LANG VOICE COMM INDIV: CPT

## 2023-08-23 NOTE — PROGRESS NOTES
Daily Note     Today's date: 2023  Patient name: Rito Alcaraz  : 1970  MRN: [id number]  Referring provider: Trudy Hernández DO  Dx:   Encounter Diagnosis     ICD-10-CM    1. Iliotibial band syndrome, right leg  M76.31                   Subjective: Pt states L side is bothering him less now. Objective: See treatment diary below  FOTO given (_____)      Assessment: Pt presented to outpatient physical therapy at [facility] with complaints of R posterior hip, lateral thigh, knee and leg pain. He presented with decreased R ITB flexibility, poor R LE balance, poor R patellar mobility, decreased tolerance to activity and decreased functional mobility due to Iliotibial band syndrome, right leg (primary encounter diagnosis). His progression has been very good in PT with much better L LE flexibility and endurance. He was only able to ambulate x 100 yards when he started PT and can now walk 3x as far without stopping. He will continue to benefit from skilled PT services in order to address these deficits and reach maximum level of function. Pt tolerated stationary bike much better post manuals compared to previous visits and resistance on bike has increased as well.     Plan: Continue plan of care     POC EXPIRES On:  23  PRECAUTIONS:  None  CO-MORBIDITES:  Concussion  PERSONAL FACTORS:  None      Manuals HEP    TFM R distal ITB supine  15 +L 12' 15' +L 15' +L 15' +L   Patellar mobs R  5rom 5 min 5min 5min 5min                     Neuro Re-Ed         Bridges  5" 20 5" 20 5" 20 5" 20 5" 20   S/L abduction R  20 20 20 +L 20+ L 20+ L            FWD Lunge         LAT Lunge                           Ther Ex        Bike  L5 12 L5 12' L5 12' L5 12' L5 12'   Supine strap R ITB stretch  5" 20 5" 20 manual manual manual   Supine LTR to L only  10" 10 10" 10 10" 10 10" 10 10" 10   Cross leg stretch to L in supine  20" 5 20" 5 20" 5 20" 5 20" 5   Seated Low Back flexion    10" 5 10" 5  10" 5    Seated HS stretch L/R    10" 5 ea 10" 5 ea 10" 5 ea                     Ther Activity                          Gait Training                          Modalities

## 2023-08-23 NOTE — PROGRESS NOTES
Daily Note     Today's date: 2023  Patient name: Joseph Erazo  : 1970  MRN: [id number]  Referring provider: Lala Eid DO  Dx:   Encounter Diagnosis     ICD-10-CM    1. History of concussion  Z87.820       2. Imbalance  R26.89       3. Cervicogenic headache  G44.86           Start Time: 1230  Stop Time: 1315  Total time in clinic (min): 45 minutes    Subjective: Neck has been more bothersome recently. Feels more stiff turning to the L. Trialing the driving videos, down and to the L always gives him dizziness. Objective: See treatment diary below      Assessment:  Pt demonstrates improved L rotation post manuals. Still pain and pinching at end range. Shows good form with exercises, some cavitations with rolling over bolster and with thoracic extension exercises. With head rotation, no dizziness present while not ambulating. Patient would benefit from continued PT to improve neck pain and headaches. Plan: Continue per plan of care. optokinetic training. Cervical manuals and postural exercises.       POC EXPIRES On:  23  PRECAUTIONS:  None  CO-MORBIDITES:  Concussion  PERSONAL FACTORS:  None      Manuals 7/28 7/31 8/10 8/16 8/21 8/23            SOR VR AC  AF AF AF AF             Manual cervical traction VR AC AF AF AF AF            1st rib mob VR                 PA mobs (C3-C5) VR                 MWM L rot VR                 Unilateral PA pressure L side VR                 Periscapular TrP VR AC TrP/ STM 10' AF 5' AF 5'  AF 6min total L scap            Neuro Re-Ed                  Walking over hurdles w/ foam between                  Tandem amb                  Walking on foam w/ cone taps                  Walking over hidden objects                  HT amb   20ft x6               Foam                  Agility ladder                  Optokinetic training     Driving videos 10 min total             Ther Ex                  Pt education                  UT stretch                  LS stretch   30" x2               Cervical rotation SNAGs x10 5' ea x10 5'' ea x10 5'  x10 5' x10 5'              Scap protraction/retraction                  Extension/flexion cervical Towel resistance x20    x10 5'              Thoracic rot thoracic ext seated over bolster + rot x10    Dowel OH thoracic rot + sidebend x6 ea    Open book 1/2 kneel x8 ea BL lat stretch standing // bar x10    Supine thoracic ext  x10 + rot    Supine x10 thoracic ext over bolster    BL lat stretch standing // bar x10 thoracic ext x10 supine over bolster Pec stretch 20" x3    Thoracic ext x10 supine over bolster Thoracic ext x10 supine over bolster    Lat stretch unilat 20" x3 ea     Open book 1/2 kneel x8 ea             Cervical ext SNAGs                  Lumbar flexion                  Lumbar side bend                  Aerobic                  Sled push                  Ther Activity                                                      Gait Training                                                      Modalities

## 2023-08-23 NOTE — PROGRESS NOTES
Occupational Therapy Daily Note:    Today's date: 2023  Patient name: Rupinder Sanabria  : 1970  MRN: [id number]  Referring provider: Carmella Jara, DO  Dx:   Encounter Diagnoses   Name Primary? • Memory problem Yes   • History of concussion    • Anxiety and depression    • PTSD (post-traumatic stress disorder)      Subjective: "There has been a lot less negative self talk"    Objective: Pt engaged in skilled OT treatment session with focus on fxnl cognition, short term memory, healthy coping education and leisure pursuits to increase engagement, endurance, tolerance, and independence with daily ADL and IADL tasks. CPT Code Minutes                                           Task Details        Therapeutic Activity  Mental Health Management:  Discussed pt's mental health since last session. pt reporting decreased periods of negative self-talk and being better able to handle reactions during times of increased stress. Pt using mantras, self checks and setting boundaries with his interactions to manage levels of stress and anxiety. Cognitive Functioning:   Pt completed dual tasks focused on use of memory strategies and cognitive organization. pt presented with a busy picture containing 28 items, given 2 min to review picture, utilize strategies of association, chunking, and grouping for recall later in the session. Pt then completed a cognitive scavenger hunt, was able to locate 4 different therapists, obtain specified information and use memory strategy of writing down information for later recall. Pt completed task with 100% accuracy. Pt then quizzed on picture; was able to recall 24/28 items; with G identifying the memory strategies he utilized to recall the items. At end of session, pt quizzed on recall of information gathered during cognitive scavenger hunt. Pt able to recall 90% of information, without using notes, after 20 min delay and a distraction.                        Neuro Re-Ed               Therapeutic Exercise               Manual          Self Care           Assessment: Tolerated treatment well. Pt cont to progress with implementation of anxiety and mental health strategies. Pt with decreased negative self talk and decreased negative emotional downward spiral. Pt with G progress with use of memory strategies for recall. Pt with improved carryover of strategies from clinic into his daily life. Patient would benefit from continued skilled OT.     Plan: Continued skilled OT per POC     INTERVENTION COMMENTS:  Diagnosis: Memory problem [R41.3]  Precautions: anxiety   Insurance: Payor: [employer] / Plan: [employer] PPO / Product Type: PPO /   Visits: 23  PN due: 9/27/23
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
## 2023-08-25 ENCOUNTER — OFFICE VISIT (OUTPATIENT)
Dept: PHYSICAL THERAPY | Facility: CLINIC | Age: 53
End: 2023-08-25
Payer: COMMERCIAL

## 2023-08-25 ENCOUNTER — APPOINTMENT (OUTPATIENT)
Dept: SPEECH THERAPY | Facility: CLINIC | Age: 53
End: 2023-08-25
Payer: COMMERCIAL

## 2023-08-25 ENCOUNTER — OFFICE VISIT (OUTPATIENT)
Facility: CLINIC | Age: 53
End: 2023-08-25
Payer: COMMERCIAL

## 2023-08-25 DIAGNOSIS — F43.10 PTSD (POST-TRAUMATIC STRESS DISORDER): ICD-10-CM

## 2023-08-25 DIAGNOSIS — M76.31 ILIOTIBIAL BAND SYNDROME, RIGHT LEG: Primary | ICD-10-CM

## 2023-08-25 DIAGNOSIS — F41.9 ANXIETY AND DEPRESSION: ICD-10-CM

## 2023-08-25 DIAGNOSIS — R41.3 MEMORY PROBLEM: ICD-10-CM

## 2023-08-25 DIAGNOSIS — F32.A ANXIETY AND DEPRESSION: ICD-10-CM

## 2023-08-25 DIAGNOSIS — Z87.820 HISTORY OF CONCUSSION: Primary | ICD-10-CM

## 2023-08-25 PROCEDURE — 97110 THERAPEUTIC EXERCISES: CPT

## 2023-08-25 PROCEDURE — 97140 MANUAL THERAPY 1/> REGIONS: CPT

## 2023-08-25 PROCEDURE — 97530 THERAPEUTIC ACTIVITIES: CPT

## 2023-08-25 NOTE — PROGRESS NOTES
Daily Note     Today's date: 2023  Patient name: Andra Ferguson  : 1970  MRN: [id number]  Referring provider: Dayana Aguilar DO  Dx:   Encounter Diagnosis     ICD-10-CM    1. Iliotibial band syndrome, right leg  M76.31                   Subjective: Pt states L side is bothering him less now. Objective: See treatment diary below  FOTO given (_____)      Assessment: Pt presented to outpatient physical therapy at [facility] with complaints of R posterior hip, lateral thigh, knee and leg pain. He presented with decreased R ITB flexibility, poor R LE balance, poor R patellar mobility, decreased tolerance to activity and decreased functional mobility due to Iliotibial band syndrome, right leg (primary encounter diagnosis). His progression has been very good in PT with much better L LE flexibility and endurance. He was only able to ambulate x 100 yards when he started PT and can now walk 3x as far without stopping. He will continue to benefit from skilled PT services in order to address these deficits and reach maximum level of function. Pt tolerated stationary bike much better post manuals compared to previous visits and resistance on bike has increased as well.     Plan: Continue plan of care     POC EXPIRES On:  23  PRECAUTIONS:  None  CO-MORBIDITES:  Concussion  PERSONAL FACTORS:  None      Manuals HEP    TFM R distal ITB supine  15' +L 15' +L 15' +L 15' +L   Patellar mobs R  5min 5min 5min 5min                   Neuro Re-Ed        Bridges  5" 20 5" 20 5" 20 5" 20   S/L abduction R  20 +L 20+ L 20+ L 20+ L           FWD Lunge        LAT Lunge                        Ther Ex       Bike  L5 12' L5 12' L5 12' L5 12'   Supine strap R ITB stretch  manual manual manual manual   Supine LTR to L only  10" 10 10" 10 10" 10 10" 10   Cross leg stretch to L in supine  20" 5 20" 5 20" 5 20" 5   Seated Low Back flexion  10" 5 10" 5  10" 5  10" 5   Seated HS stretch L/R  10" 5 ea 10" 5 ea 10" 5 ea 10" 5 ea                   Ther Activity                       Gait Training                       Modalities

## 2023-08-25 NOTE — PROGRESS NOTES
Occupational Therapy Daily Note:    Today's date: 2023  Patient name: Abhishek Gay  : 1970  MRN: [id number]  Referring provider: Chares Lombard, DO  Dx:   Encounter Diagnoses   Name Primary? • History of concussion Yes   • Memory problem    • Anxiety and depression    • PTSD (post-traumatic stress disorder)      Subjective: pt without complaints     Objective: Pt engaged in skilled OT treatment session with focus on fxnl cognition, short term memory, healthy coping education and leisure pursuits to increase engagement, endurance, tolerance, and independence with daily ADL and IADL tasks. CPT Code Minutes                                           Task Details        Therapeutic Activity  Cognitive functioning:  Pt engaged in functional task focused on sustained attn and divided attn. Pt with G attn to task and G multi-tasking in a high stim environment. Pt with G ability to complete a tabletop worksheet, following 4 different task directions. Pt completed with 95% accuracy. STM/ recall:  Pt asked to recall detailed picture from last session. Pt able to recall 24/28 items; when provided min cues, pt was able to recall 2 more items. Pt with G utilization of memory strategies of visualization, association, chunking and repetition                            Neuro Re-Ed               Therapeutic Exercise               Manual          Self Care           Assessment: Tolerated treatment well. Pt with G utilization of memory strategies, improving sustained attn and divided attn. Pt with improved carryover of strategies from clinic into his daily life. Patient would benefit from continued skilled OT.     Plan: Continued skilled OT per POC     INTERVENTION COMMENTS:  Diagnosis: Memory problem [R41.3]  Precautions: anxiety   Insurance: Payor: Sunitha Mccarty / Plan: Sunitha Mccarty PPO / Product Type: PPO /   Visits: 24  PN due: 23

## 2023-08-30 ENCOUNTER — APPOINTMENT (OUTPATIENT)
Dept: SPEECH THERAPY | Facility: CLINIC | Age: 53
End: 2023-08-30
Payer: COMMERCIAL

## 2023-08-30 ENCOUNTER — OFFICE VISIT (OUTPATIENT)
Facility: CLINIC | Age: 53
End: 2023-08-30
Payer: COMMERCIAL

## 2023-08-30 DIAGNOSIS — R41.3 MEMORY PROBLEM: ICD-10-CM

## 2023-08-30 DIAGNOSIS — F43.10 PTSD (POST-TRAUMATIC STRESS DISORDER): ICD-10-CM

## 2023-08-30 DIAGNOSIS — R26.89 IMBALANCE: ICD-10-CM

## 2023-08-30 DIAGNOSIS — G44.86 CERVICOGENIC HEADACHE: ICD-10-CM

## 2023-08-30 DIAGNOSIS — F41.9 ANXIETY AND DEPRESSION: ICD-10-CM

## 2023-08-30 DIAGNOSIS — Z87.820 HISTORY OF CONCUSSION: Primary | ICD-10-CM

## 2023-08-30 DIAGNOSIS — F32.A ANXIETY AND DEPRESSION: ICD-10-CM

## 2023-08-30 PROCEDURE — 97530 THERAPEUTIC ACTIVITIES: CPT

## 2023-08-30 PROCEDURE — 97110 THERAPEUTIC EXERCISES: CPT

## 2023-08-30 PROCEDURE — 97140 MANUAL THERAPY 1/> REGIONS: CPT

## 2023-08-30 NOTE — PROGRESS NOTES
Occupational Therapy Daily Note:    Today's date: 2023  Patient name: Luisa Kumar  : 1970  MRN: [id number]  Referring provider: Amber Jessica DO  Dx:   Encounter Diagnoses   Name Primary? • History of concussion Yes   • Memory problem    • Anxiety and depression    • PTSD (post-traumatic stress disorder)      Subjective: "it's been up and down"     Objective: Pt engaged in skilled OT treatment session with focus on fxnl cognition, short term memory, healthy coping education and leisure pursuits to increase engagement, endurance, tolerance, and independence with daily ADL and IADL tasks. CPT Code Minutes                                           Task Details        Therapeutic Activity  Mental health management:   Pt completed an emotional wellness worksheet, consisting of the followin. grading severity of deficits of memory & attn, executive skills and emotional stability. Pt identifying improvement from severe deficits to mod/severe in all areas. 2. Identifying situations that challenge memory & attn, executive skills and emotional stability. Pt with G insight. 3. Identifying strategies to improve memory & attn, executive skills and emotional stability. Pt I with identifying 2 or 3 strategies in each area. Pt then completed brainstorming sheet identifying the followin. Today's self care activities  2. Stress management techniques  3. Ways he feels successful                         Cognitive Functioning:  Pt engaged in functional task focused on executive functions, planning, organizing and sustained attn to task. Pt completed a tabletop worksheet with G sustained attn to task amid a distracting environment. Pt with G organization of multiple tasks and planning of a day using specified restrictions. Neuro Re-Ed               Therapeutic Exercise               Manual          HEP           Assessment: Tolerated treatment well.  Pt with improving executive functions, improving attn to task and improving carryover of strategies. Pt making G progress towards goals. Patient would benefit from continued skilled OT.     Plan: Continued skilled OT per POC     INTERVENTION COMMENTS:  Diagnosis: Memory problem [R41.3]  Precautions: anxiety   Insurance: Payor: Levy Host / Plan: Norvel Host PPO / Product Type: PPO /   Visits: 25  PN due: 9/27/23

## 2023-08-30 NOTE — PROGRESS NOTES
Daily Note     Today's date: 2023  Patient name: Rajat Abernathy  : 1970  MRN: [id number]  Referring provider: Braulio Macias DO  Dx:   Encounter Diagnosis     ICD-10-CM    1. History of concussion  Z87.820       2. Imbalance  R26.89       3. Cervicogenic headache  G44.86           Start Time: 1020  Stop Time: 1100  Total time in clinic (min): 40 minutes    Subjective: Neck pain has been worse more recently. To the L feels blocked. Feels more fogginess and pain/nausea when turning to the L.        Objective: See treatment diary below      Assessment:  Pt reports dull ache of referred pain into R side of head. Manuals followed by cervical ROM exercises with fair response in ROM, less so toward the L side. Seems to respond best to bolster rolling and including flexion into rotation to gain L rotation ROM. Discussed obtaining foam roller for home as he seems to respond well to this in gaining ROM and reducing pain, pt in agreement. Patient would benefit from continued PT to improve neck pain and headaches. Plan: Continue per plan of care. optokinetic training. Cervical manuals and postural exercises.       POC EXPIRES On:  23  PRECAUTIONS:  None  CO-MORBIDITES:  Concussion  PERSONAL FACTORS:  None      Manuals 7/28 7/31 8/10 8/16 8/21 8/23 8/30           SOR VR AC  AF AF AF AF  AF           Manual cervical traction VR AC AF AF AF AF AF           1st rib mob VR                 PA mobs (C3-C5) VR                 MWM L rot VR                 Unilateral PA pressure L side VR                 Periscapular TrP VR AC TrP/ STM 10' AF 5' AF 5'  AF 6min total L scap Af 6min total L periscap TrP           Neuro Re-Ed                  Walking over hurdles w/ foam between                  Tandem amb                  Walking on foam w/ cone taps                  Walking over hidden objects                  HT amb   20ft x6               Foam                  Agility ladder                  Optokinetic training Driving videos 10 min total             Ther Ex                  Pt education                  UT stretch                  LS stretch   30" x2               Cervical rotation SNAGs x10 5' ea x10 5'' ea x10 5'  x10 5' x10 5'   x10 5'           Scap protraction/retraction                  Extension/flexion cervical Towel resistance x20    x10 5'   Flexion + rotation x20           Thoracic rot thoracic ext seated over bolster + rot x10    Dowel OH thoracic rot + sidebend x6 ea    Open book 1/2 kneel x8 ea BL lat stretch standing // bar x10    Supine thoracic ext  x10 + rot    Supine x10 thoracic ext over bolster    BL lat stretch standing // bar x10 thoracic ext x10 supine over bolster Pec stretch 20" x3    Thoracic ext x10 supine over bolster Thoracic ext x10 supine over bolster    Lat stretch unilat 20" x3 ea     Open book 1/2 kneel x8 ea  Lat stretch bilat x10    Bolster thoracic ext x10           Cervical ext SNAGs       x10 5'           Lumbar flexion                  Lumbar side bend                  Aerobic                  Sled push                  Ther Activity                                                      Gait Training                                                      Modalities

## 2023-09-06 ENCOUNTER — APPOINTMENT (OUTPATIENT)
Dept: SPEECH THERAPY | Facility: CLINIC | Age: 53
End: 2023-09-06
Payer: COMMERCIAL

## 2023-09-06 ENCOUNTER — OFFICE VISIT (OUTPATIENT)
Dept: PHYSICAL THERAPY | Facility: CLINIC | Age: 53
End: 2023-09-06
Payer: COMMERCIAL

## 2023-09-06 ENCOUNTER — OFFICE VISIT (OUTPATIENT)
Facility: CLINIC | Age: 53
End: 2023-09-06
Payer: COMMERCIAL

## 2023-09-06 DIAGNOSIS — R26.89 IMBALANCE: ICD-10-CM

## 2023-09-06 DIAGNOSIS — M76.31 ILIOTIBIAL BAND SYNDROME, RIGHT LEG: Primary | ICD-10-CM

## 2023-09-06 DIAGNOSIS — G44.86 CERVICOGENIC HEADACHE: ICD-10-CM

## 2023-09-06 DIAGNOSIS — Z87.820 HISTORY OF CONCUSSION: Primary | ICD-10-CM

## 2023-09-06 PROCEDURE — 97110 THERAPEUTIC EXERCISES: CPT

## 2023-09-06 PROCEDURE — 97140 MANUAL THERAPY 1/> REGIONS: CPT

## 2023-09-06 NOTE — PROGRESS NOTES
Daily Note     Today's date: 2023  Patient name: Rupinder Sanabria  : 1970  MRN: [id number]  Referring provider: Carmella Jara DO  Dx:   Encounter Diagnosis     ICD-10-CM    1. Iliotibial band syndrome, right leg  M76.31           Start Time: 1445  Stop Time: 1530    Subjective: Pt states L side is bothering him less now. Objective: See treatment diary below  FOTO given (_____)      Assessment: Pt presented to outpatient physical therapy at [facility] with complaints of R posterior hip, lateral thigh, knee and leg pain. He presented with decreased R ITB flexibility, poor R LE balance, poor R patellar mobility, decreased tolerance to activity and decreased functional mobility due to Iliotibial band syndrome, right leg (primary encounter diagnosis). His progression has been very good in PT with much better L LE flexibility and endurance. He was only able to ambulate x 100 yards when he started PT and can now walk 3x as far without stopping. He will continue to benefit from skilled PT services in order to address these deficits and reach maximum level of function. Pt tolerated stationary bike much better post manuals compared to previous visits and resistance on bike has increased as well.     Plan: Continue plan of care     POC EXPIRES On:  23  PRECAUTIONS:  None  CO-MORBIDITES:  Concussion  PERSONAL FACTORS:  None      Manuals HEP    TFM R distal ITB supine  15' +L 15' +L 15' + L   Patellar mobs R  5min 5min 5min                 Neuro Re-Ed       Bridges  5" 20 5" 20 5" 20   S/L abduction R  20+ L 20+ L 20+L          FWD Lunge       LAT Lunge                     Ther Ex      Bike  L5 12' L5 12' L5 12'   Supine strap R ITB stretch  manual manual manual   Supine LTR to L only  10" 10 10" 10 10" 10   Cross leg stretch to L in supine  20" 5 20" 5 20" 5   Seated Low Back flexion  10" 5  10" 5 10" 5   Seated HS stretch L/R  10" 5 ea 10" 5 ea 10" 5 ea                 Ther Activity                    Gait Training                    Modalities

## 2023-09-06 NOTE — PROGRESS NOTES
Daily Note     Today's date: 2023  Patient name: Polo Armenta  : 1970  MRN: [id number]  Referring provider: Kelsy Becker DO  Dx:   Encounter Diagnosis     ICD-10-CM    1. History of concussion  Z87.820       2. Imbalance  R26.89       3. Cervicogenic headache  G44.86           Start Time: 1545  Stop Time: 1626  Total time in clinic (min): 41 minutes    Subjective: Pt reports he feels he is "a disaster". Moved sister out did not have to carry much out. During [place] neck was killing him, on the way back home he was crying from pain. Today is slowly recovering, ROM is returning but is painful and restricted. Objective: See treatment diary below      Assessment:  Pt shows reduced rotational cervical ROM from prior sessions since recent flare up of pain/stiffness. Improved ROM post manuals and exercise. Pt shows good knowledge of program, min cues required for completion. Seemed to respond fairly to addition of isometric rotation, slight increase in dizziness with this but instructed that some is okay. Still reports some feeling of imbalance/dizziness with neck movements. Instructed to trial isometrics performed today for cervical rotation. Patient would benefit from continued PT to improve neck pain and headaches. Plan: Continue per plan of care. optokinetic training. Cervical manuals and postural exercises.       POC EXPIRES On:  23  PRECAUTIONS:  None  CO-MORBIDITES:  Concussion  PERSONAL FACTORS:  None      Manuals 7/28 7/31 8/10 8/16 8/21 8/23 8/30 9/6          SOR VR AC  AF AF AF AF  AF AF          Manual cervical traction VR AC AF AF AF AF AF AF          1st rib mob VR                 PA mobs (C3-C5) VR                 MWM L rot VR                 Unilateral PA pressure L side VR                 Periscapular TrP VR AC TrP/ STM 10' AF 5' AF 5'  AF 6min total L scap Af 6min total L periscap TrP AF 6min total L periscap TrP          Neuro Re-Ed                  Walking over hurdles w/ foam between                  Tandem amb                  Walking on foam w/ cone taps                  Walking over hidden objects                  HT amb   20ft x6               Foam                  Agility ladder                  Optokinetic training     Driving videos 10 min total             Ther Ex                  Pt education                  UT stretch                  LS stretch   30" x2               Cervical rotation SNAGs x10 5' ea x10 5'' ea x10 5'  x10 5' x10 5'   x10 5'           Scap protraction/retraction                  Extension/flexion cervical Towel resistance x20    x10 5'   Flexion + rotation x20           Thoracic rot thoracic ext seated over bolster + rot x10    Dowel OH thoracic rot + sidebend x6 ea    Open book 1/2 kneel x8 ea BL lat stretch standing // bar x10    Supine thoracic ext  x10 + rot    Supine x10 thoracic ext over bolster    BL lat stretch standing // bar x10 thoracic ext x10 supine over bolster Pec stretch 20" x3    Thoracic ext x10 supine over bolster Thoracic ext x10 supine over bolster    Lat stretch unilat 20" x3 ea     Open book 1/2 kneel x8 ea  Lat stretch bilat x10    Bolster thoracic ext x10           Cervical ext SNAGs       x10 5'           Lumbar flexion                  Lumbar side bend                  Aerobic                  Sled push                  Ther Activity                                                      Gait Training                                                      Modalities

## 2023-09-08 ENCOUNTER — OFFICE VISIT (OUTPATIENT)
Dept: PHYSICAL THERAPY | Facility: CLINIC | Age: 53
End: 2023-09-08
Payer: COMMERCIAL

## 2023-09-08 ENCOUNTER — OFFICE VISIT (OUTPATIENT)
Facility: CLINIC | Age: 53
End: 2023-09-08
Payer: COMMERCIAL

## 2023-09-08 ENCOUNTER — APPOINTMENT (OUTPATIENT)
Dept: SPEECH THERAPY | Facility: CLINIC | Age: 53
End: 2023-09-08
Payer: COMMERCIAL

## 2023-09-08 DIAGNOSIS — F41.9 ANXIETY AND DEPRESSION: ICD-10-CM

## 2023-09-08 DIAGNOSIS — M76.31 ILIOTIBIAL BAND SYNDROME, RIGHT LEG: Primary | ICD-10-CM

## 2023-09-08 DIAGNOSIS — F43.10 PTSD (POST-TRAUMATIC STRESS DISORDER): ICD-10-CM

## 2023-09-08 DIAGNOSIS — R41.3 MEMORY PROBLEM: ICD-10-CM

## 2023-09-08 DIAGNOSIS — Z87.820 HISTORY OF CONCUSSION: Primary | ICD-10-CM

## 2023-09-08 DIAGNOSIS — F32.A ANXIETY AND DEPRESSION: ICD-10-CM

## 2023-09-08 PROCEDURE — 97530 THERAPEUTIC ACTIVITIES: CPT

## 2023-09-08 PROCEDURE — 97140 MANUAL THERAPY 1/> REGIONS: CPT

## 2023-09-08 PROCEDURE — 97110 THERAPEUTIC EXERCISES: CPT

## 2023-09-08 NOTE — PROGRESS NOTES
Occupational Therapy Daily Note:    Today's date: 2023  Patient name: Lisa Stringer  : 1970  MRN: [id number]  Referring provider: Marie Murillo DO  Dx:   Encounter Diagnoses   Name Primary? • History of concussion Yes   • Memory problem    • Anxiety and depression    • PTSD (post-traumatic stress disorder)      Subjective: "the last few days have been a mess"     Objective: Pt engaged in skilled OT treatment session with focus on fxnl cognition, short term memory, healthy coping education and leisure pursuits to increase engagement, endurance, tolerance, and independence with daily ADL and IADL tasks. CPT Code Minutes                                           Task Details        Therapeutic Activity  Mental Health Management:  Discussed pt's mental health since last session. Pt reporting that the last few days have been difficult. Pt reporting he has increased awareness of triggers and does implement strategies to better manage his mental health. Pt continues to use  mantras, self checks and setting boundaries with his interactions to manage levels of stress and anxiety                     Cognitive Re-training:   Pt engaged in functional task focused on executive functions, planning, organizing and sustained attn to task. Pt completed a tabletop worksheet with G+ sustained attn to task amid a distracting environment and with pointed distractions. Pt with G organization of multiple tasks and planning of a day using specified restrictions. Neuro Re-Ed               Therapeutic Exercise               Manual          HEP           Assessment: Tolerated treatment well. Pt with improving executive functions, improving attn to task and improving carryover of strategies. Pt making G progress towards goals. Patient would benefit from continued skilled OT.     Plan: Continued skilled OT per POC     INTERVENTION COMMENTS:  Diagnosis: Memory problem [R41.3]  Precautions: anxiety   Insurance: Payor:  Gil Singh / Plan: Gil Singh PPO / Product Type: PPO /   Visits: 26  PN due: 9/27/23

## 2023-09-08 NOTE — PROGRESS NOTES
Daily Note     Today's date: 2023  Patient name: Cyndi Rao  : 1970  MRN: [id number]  Referring provider: Skip Kim DO  Dx:   Encounter Diagnosis     ICD-10-CM    1. Iliotibial band syndrome, right leg  M76.31                   Subjective: Pt states L side is bothering him less now. Objective: See treatment diary below  FOTO given (_____)      Assessment: Pt presented to outpatient physical therapy at [facility] with complaints of R posterior hip, lateral thigh, knee and leg pain. He presented with decreased R ITB flexibility, poor R LE balance, poor R patellar mobility, decreased tolerance to activity and decreased functional mobility due to Iliotibial band syndrome, right leg (primary encounter diagnosis). His progression has been very good in PT with much better L LE flexibility and endurance. He was only able to ambulate x 100 yards when he started PT and can now walk 3x as far without stopping. He will continue to benefit from skilled PT services in order to address these deficits and reach maximum level of function. Pt tolerated stationary bike much better post manuals compared to previous visits and resistance on bike has increased as well.     Plan: Continue plan of care     POC EXPIRES On:  23  PRECAUTIONS:  None  CO-MORBIDITES:  Concussion  PERSONAL FACTORS:  None      Manuals HEP    TFM R distal ITB supine  15' +L 15' +L 15' + L 15' + L   Patellar mobs R  5min 5min 5min 5min   Quad stretch R/L     5min           Neuro Re-Ed        Bridges  5" 20 5" 20 5" 20 5" 20   S/L abduction R  20+ L 20+ L 20+L 20+ L           FWD Lunge        LAT Lunge                        Ther Ex       Bike  L5 12' L5 12' L5 12' L5 10'   Supine strap R ITB stretch  manual manual manual manual   Supine LTR to L only  10" 10 10" 10 10" 10 10" 10   Cross leg stretch to L in supine  20" 5 20" 5 20" 5 20" 5   Seated Low Back flexion  10" 5  10" 5 10" 5 10" 5   Seated HS stretch L/R  10" 5 ea 10" 5 ea 10" 5 ea 10" 5 ea                   Ther Activity                       Gait Training                       Modalities

## 2023-09-12 ENCOUNTER — OFFICE VISIT (OUTPATIENT)
Dept: PHYSICAL THERAPY | Facility: CLINIC | Age: 53
End: 2023-09-12
Payer: COMMERCIAL

## 2023-09-12 ENCOUNTER — APPOINTMENT (OUTPATIENT)
Facility: CLINIC | Age: 53
End: 2023-09-12
Payer: COMMERCIAL

## 2023-09-12 DIAGNOSIS — M76.31 ILIOTIBIAL BAND SYNDROME, RIGHT LEG: Primary | ICD-10-CM

## 2023-09-12 PROCEDURE — 97140 MANUAL THERAPY 1/> REGIONS: CPT

## 2023-09-12 PROCEDURE — 97110 THERAPEUTIC EXERCISES: CPT

## 2023-09-12 PROCEDURE — 97112 NEUROMUSCULAR REEDUCATION: CPT

## 2023-09-12 NOTE — PROGRESS NOTES
Daily Note     Today's date: 2023  Patient name: Lisa Stringer  : 1970  MRN: [id number]  Referring provider: Marie Murillo DO  Dx:   Encounter Diagnosis     ICD-10-CM    1. Iliotibial band syndrome, right leg  M76.31                      Subjective: stretching continues to help with the knee pain. Objective: See treatment diary below  FOTO given (_____)      Assessment: Pt presented to outpatient physical therapy at [facility] with complaints of R posterior hip, lateral thigh, knee and leg pain. He presented with decreased R ITB flexibility, poor R LE balance, poor R patellar mobility, decreased tolerance to activity and decreased functional mobility due to Iliotibial band syndrome, right leg (primary encounter diagnosis). His progression has been very good in PT with much better L LE flexibility and endurance. He was only able to ambulate x 100 yards when he started PT and can now walk 3x as far without stopping. He will continue to benefit from skilled PT services in order to address these deficits and reach maximum level of function. Pt continues to respond well to manual interventions. Added EOT ITB LLLD stretching to program and HEP, favorable response. Plan: Continue per plan of care.        POC EXPIRES On:  23  PRECAUTIONS:  None  CO-MORBIDITES:  Concussion  PERSONAL FACTORS:  None      Manuals HEP    TFM R distal ITB supine  15' +L 15' +L 15' + L 15' + L 10' + L   Patellar mobs R  5min 5min 5min 5min 4'   Quad stretch R/L     5min 4'            Neuro Re-Ed         Bridges  5" 20 5" 20 5" 20 5" 20 5" 20   S/L abduction R  20+ L 20+ L 20+L 20+ L 20+ L            FWD Lunge         LAT Lunge                           Ther Ex        Bike  L5 12' L5 12' L5 12' L5 10' L5 10'   Supine strap R ITB stretch  manual manual manual manual S/L EOT   Supine LTR to L only  10" 10 10" 10 10" 10 10" 10 10" 10   Cross leg stretch to L in supine  20" 5 20" 5 20" 5 20" 5 20" 5   Seated Low Back flexion  10" 5  10" 5 10" 5 10" 5 10" 5   Seated HS stretch L/R  10" 5 ea 10" 5 ea 10" 5 ea 10" 5 ea 10" 5 ea                     Ther Activity                          Gait Training                          Modalities

## 2023-09-13 ENCOUNTER — OFFICE VISIT (OUTPATIENT)
Facility: CLINIC | Age: 53
End: 2023-09-13
Payer: COMMERCIAL

## 2023-09-13 ENCOUNTER — APPOINTMENT (OUTPATIENT)
Dept: SPEECH THERAPY | Facility: CLINIC | Age: 53
End: 2023-09-13
Payer: COMMERCIAL

## 2023-09-13 ENCOUNTER — APPOINTMENT (OUTPATIENT)
Facility: CLINIC | Age: 53
End: 2023-09-13
Payer: COMMERCIAL

## 2023-09-13 DIAGNOSIS — G44.86 CERVICOGENIC HEADACHE: ICD-10-CM

## 2023-09-13 DIAGNOSIS — R26.89 IMBALANCE: ICD-10-CM

## 2023-09-13 DIAGNOSIS — Z87.820 HISTORY OF CONCUSSION: Primary | ICD-10-CM

## 2023-09-13 PROCEDURE — 97110 THERAPEUTIC EXERCISES: CPT

## 2023-09-13 PROCEDURE — 97140 MANUAL THERAPY 1/> REGIONS: CPT

## 2023-09-13 NOTE — PROGRESS NOTES
Daily Note     Today's date: 2023  Patient name: Janet Vásquez  : 1970  MRN: [id number]  Referring provider: Cary Call DO  Dx:   Encounter Diagnosis     ICD-10-CM    1. History of concussion  Z87.820       2. Imbalance  R26.89       3. Cervicogenic headache  G44.86           Start Time: 1038  Stop Time: 1116  Total time in clinic (min): 38 minutes    Subjective: Neck has been feeling better recently. Using his inversion table, has not gotten bolster yet but he wants to. Drove for 2 hours with minimal inc in neck discomfort. Now no response with driving videos. Objective: See treatment diary below      Assessment:  Reported soreness post session but feels like better ROM. Appears to have better ROM since last session, more so post manuals and ROM exercises. Some cavitations present with neck movment after manuals, however, none during manuals. Does have some triggers points up to R side of head. Verbal cues for cervical rotation exercises. Otherwise good form with mobility exercises. Discussed titrating down PT, reducing to 1x/week or 1x/bilweekly - pt in agreement with plan. Patient would benefit from continued PT to improve neck pain and headaches. Plan: Continue per plan of care. Cervical manuals and postural exercises.       POC EXPIRES On:  23  PRECAUTIONS:  None  CO-MORBIDITES:  Concussion  PERSONAL FACTORS:  None      Manuals 7/28 7/31 8/10 8/16 8/21 8/23 8/30 9/6 9/15         SOR VR AC  AF AF AF AF  AF AF AF         Manual cervical traction VR AC AF AF AF AF AF AF AF         1st rib mob VR        AF         PA mobs (C3-C5) VR        AF         MWM L rot VR                 Unilateral PA pressure L side VR                 Periscapular TrP VR AC TrP/ STM 10' AF 5' AF 5'  AF 6min total L scap Af 6min total L periscap TrP AF 6min total L periscap TrP AF 5min total L periscap TrP         Neuro Re-Ed                  Walking over hurdles w/ foam between                  Tandem amb Walking on foam w/ cone taps                  Walking over hidden objects                  HT amb   20ft x6               Foam                  Agility ladder                  Optokinetic training     Driving videos 10 min total             Ther Ex                  Pt education                  UT stretch                  LS stretch   30" x2               Cervical rotation SNAGs x10 5' ea x10 5'' ea x10 5'  x10 5' x10 5'   x10 5'           Scap protraction/retraction                  Extension/flexion cervical Towel resistance x20    x10 5'   Flexion + rotation x20  Flexion + rotation x10         Thoracic rot thoracic ext seated over bolster + rot x10    Dowel OH thoracic rot + sidebend x6 ea    Open book 1/2 kneel x8 ea BL lat stretch standing // bar x10    Supine thoracic ext  x10 + rot    Supine x10 thoracic ext over bolster    BL lat stretch standing // bar x10 thoracic ext x10 supine over bolster Pec stretch 20" x3    Thoracic ext x10 supine over bolster Thoracic ext x10 supine over bolster    Lat stretch unilat 20" x3 ea     Open book 1/2 kneel x8 ea  Lat stretch bilat x10    Bolster thoracic ext x10  Lat stretch unilateral x10, bilat x10    Bolster thoracic ext x10         Cervical ext SNAGs       x10 5'           Lumbar flexion                  Lumbar side bend                  Aerobic                  Sled push                  Ther Activity                                                      Gait Training                                                      Modalities

## 2023-09-14 ENCOUNTER — OFFICE VISIT (OUTPATIENT)
Dept: PHYSICAL THERAPY | Facility: CLINIC | Age: 53
End: 2023-09-14
Payer: COMMERCIAL

## 2023-09-14 DIAGNOSIS — M76.31 ILIOTIBIAL BAND SYNDROME, RIGHT LEG: Primary | ICD-10-CM

## 2023-09-14 PROCEDURE — 97110 THERAPEUTIC EXERCISES: CPT

## 2023-09-14 PROCEDURE — 97140 MANUAL THERAPY 1/> REGIONS: CPT

## 2023-09-14 NOTE — PROGRESS NOTES
Daily Note     Today's date: 2023  Patient name: Andra Ferguson  : 1970  MRN: [id number]  Referring provider: Dayana Aguilar DO  Dx:   Encounter Diagnosis     ICD-10-CM    1. Iliotibial band syndrome, right leg  M76.31                      Subjective: stretching continues to help with the knee pain. Objective: See treatment diary below  FOTO given (_____)      Assessment: Pt presented to outpatient physical therapy at [facility] with complaints of R posterior hip, lateral thigh, knee and leg pain. He presented with decreased R ITB flexibility, poor R LE balance, poor R patellar mobility, decreased tolerance to activity and decreased functional mobility due to Iliotibial band syndrome, right leg (primary encounter diagnosis). His progression has been very good in PT with much better L LE flexibility and endurance. He was only able to ambulate x 100 yards when he started PT and can now walk 3x as far without stopping. He will continue to benefit from skilled PT services in order to address these deficits and reach maximum level of function. Pt continues to respond well to manual interventions. Plan: Continue per plan of care.        POC EXPIRES On:  23  PRECAUTIONS:  None  CO-MORBIDITES:  Concussion  PERSONAL FACTORS:  None      Manuals HEP    TFM R distal ITB supine  15' + L 15' + L 10' + L 15+L   Patellar mobs R  5min 5min 4' 4'   Quad stretch R/L   5min 4' 4'           Neuro Re-Ed        Bridges  5" 20 5" 20 5" 20 5" 20   S/L abduction R  20+L 20+ L 20+ L 20+L           FWD Lunge        LAT Lunge                        Ther Ex       Bike  L5 12' L5 10' L5 10' L5 10'   Supine strap R ITB stretch  manual manual S/L EOT manual   Supine LTR to L only  10" 10 10" 10 10" 10 10" 10   Cross leg stretch to L in supine  20" 5 20" 5 20" 5 20" 5   Seated Low Back flexion  10" 5 10" 5 10" 5 10" 5   Seated HS stretch L/R  10" 5 ea 10" 5 ea 10" 5 ea 10" 5 Ther Activity                       Gait Training                       Modalities

## 2023-09-15 ENCOUNTER — APPOINTMENT (OUTPATIENT)
Dept: SPEECH THERAPY | Facility: CLINIC | Age: 53
End: 2023-09-15
Payer: COMMERCIAL

## 2023-09-15 ENCOUNTER — OFFICE VISIT (OUTPATIENT)
Facility: CLINIC | Age: 53
End: 2023-09-15
Payer: COMMERCIAL

## 2023-09-15 DIAGNOSIS — R26.89 IMBALANCE: ICD-10-CM

## 2023-09-15 DIAGNOSIS — G44.86 CERVICOGENIC HEADACHE: ICD-10-CM

## 2023-09-15 DIAGNOSIS — Z87.820 HISTORY OF CONCUSSION: Primary | ICD-10-CM

## 2023-09-15 PROCEDURE — 97110 THERAPEUTIC EXERCISES: CPT

## 2023-09-15 PROCEDURE — 97140 MANUAL THERAPY 1/> REGIONS: CPT

## 2023-09-19 ENCOUNTER — OFFICE VISIT (OUTPATIENT)
Facility: CLINIC | Age: 53
End: 2023-09-19
Payer: COMMERCIAL

## 2023-09-19 ENCOUNTER — APPOINTMENT (OUTPATIENT)
Dept: SPEECH THERAPY | Facility: CLINIC | Age: 53
End: 2023-09-19
Payer: COMMERCIAL

## 2023-09-19 DIAGNOSIS — Z87.820 HISTORY OF CONCUSSION: Primary | ICD-10-CM

## 2023-09-19 DIAGNOSIS — R26.89 IMBALANCE: ICD-10-CM

## 2023-09-19 PROCEDURE — 97110 THERAPEUTIC EXERCISES: CPT

## 2023-09-19 PROCEDURE — 97140 MANUAL THERAPY 1/> REGIONS: CPT

## 2023-09-19 NOTE — PROGRESS NOTES
Daily Note     Today's date: 2023  Patient name: Felton Zimmerman  : 1970  MRN: [id number]  Referring provider: Jesu Duran DO  Dx:   Encounter Diagnosis     ICD-10-CM    1. History of concussion  Z87.820       2. Imbalance  R26.89           Start Time: 945  Stop Time: 1030  Total time in clinic (min): 45 minutes    Subjective: Reports feeling good today. Feels the ROM in the neck has been much better. Spoke about ordering foam roller for home. Objective: See treatment diary below      Assessment: Tolerated treatment well today. Continues to have trigger points and soft tissue restrictions in the L UT and LS. Trigger point noted in R UT today. Post manual, reports feeling good with better ROM in the neck and L scapular region. Good mobility with exercises and able to complete independently. Patient will continue to benefit from skilled PT to neck pain and headaches. Plan: Continue per plan of care. cervical manuals, postural exercises, progress HEP for potential discharge.       POC EXPIRES On:  23  PRECAUTIONS:  None  CO-MORBIDITES:  Concussion  PERSONAL FACTORS:  None      Manuals  8/10 8/16 8/21 8/23 8/30 9/6 9/13 9/15 9/19   SOR VR AC  AF AF AF AF  AF AF AF JG JG   Manual cervical traction VR AC AF AF AF AF AF AF AF JG JG   1st rib mob VR        AF     PA mobs (C3-C5) VR        AF     MWM L rot VR             Unilateral PA pressure L side VR             Periscapular TrP VR AC TrP/ STM 10' AF 5' AF 5'  AF 6min total L scap Af 6min total L periscap TrP AF 6min total L periscap TrP AF 5min total L periscap TrP JG 5min total L periscap TrP JG  8min total L periscap TrP   Neuro Re-Ed              Walking over hurdles w/ foam between              Tandem amb              Walking on foam w/ cone taps              Walking over hidden objects              HT amb   20ft x6           Foam              Agility ladder              Optokinetic training     Driving videos 10 min total Ther Ex              Pt education              UT stretch              LS stretch   30" x2           Cervical rotation SNAGs x10 5' ea x10 5'' ea x10 5'  x10 5' x10 5'   x10 5'       Scap protraction/retraction              Extension/flexion cervical Towel resistance x20    x10 5'   Flexion + rotation x20  Flexion + rotation x10 Flexion + rotation x10 flexion+rotation x10   Thoracic rot thoracic ext seated over bolster + rot x10    Dowel OH thoracic rot + sidebend x6 ea    Open book 1/2 kneel x8 ea BL lat stretch standing // bar x10    Supine thoracic ext  x10 + rot    Supine x10 thoracic ext over bolster    BL lat stretch standing // bar x10 thoracic ext x10 supine over bolster Pec stretch 20" x3    Thoracic ext x10 supine over bolster Thoracic ext x10 supine over bolster    Lat stretch unilat 20" x3 ea     Open book 1/2 kneel x8 ea  Lat stretch bilat x10    Bolster thoracic ext x10  Lat stretch unilateral x10, bilat x10    Bolster thoracic ext x10 Lat stretch unilateral x10, bilat x10    Bolster thoracic ext x10 Lat strecth U/L x10, B/L x10    bolster thoracic ext x10   Cervical ext SNAGs       x10 5'       Lumbar flexion              Lumbar side bend              Aerobic              Sled push              Ther Activity                                          Gait Training                                          Modalities

## 2023-09-20 ENCOUNTER — OFFICE VISIT (OUTPATIENT)
Facility: CLINIC | Age: 53
End: 2023-09-20
Payer: COMMERCIAL

## 2023-09-20 ENCOUNTER — OFFICE VISIT (OUTPATIENT)
Dept: PHYSICAL THERAPY | Facility: CLINIC | Age: 53
End: 2023-09-20
Payer: COMMERCIAL

## 2023-09-20 DIAGNOSIS — F32.A ANXIETY AND DEPRESSION: ICD-10-CM

## 2023-09-20 DIAGNOSIS — R41.3 MEMORY PROBLEM: ICD-10-CM

## 2023-09-20 DIAGNOSIS — Z87.820 HISTORY OF CONCUSSION: Primary | ICD-10-CM

## 2023-09-20 DIAGNOSIS — M76.31 ILIOTIBIAL BAND SYNDROME, RIGHT LEG: Primary | ICD-10-CM

## 2023-09-20 DIAGNOSIS — F43.10 PTSD (POST-TRAUMATIC STRESS DISORDER): ICD-10-CM

## 2023-09-20 DIAGNOSIS — F41.9 ANXIETY AND DEPRESSION: ICD-10-CM

## 2023-09-20 PROCEDURE — 97140 MANUAL THERAPY 1/> REGIONS: CPT

## 2023-09-20 PROCEDURE — 97110 THERAPEUTIC EXERCISES: CPT

## 2023-09-20 PROCEDURE — 97530 THERAPEUTIC ACTIVITIES: CPT

## 2023-09-20 NOTE — PROGRESS NOTES
Daily Note     Today's date: 2023  Patient name: Jose Rivera  : 1970  MRN: [id number]  Referring provider: Tiera Kerr DO  Dx:   Encounter Diagnosis     ICD-10-CM    1. Iliotibial band syndrome, right leg  M76.31                      Subjective: stretching continues to help with the knee pain. Believes PT has helped him a lot especially the manuals being performed. Objective: See treatment diary below  FOTO given (_____)      Assessment: Pt presented to outpatient physical therapy at [facility] with complaints of R posterior hip, lateral thigh, knee and leg pain. He presented with decreased R ITB flexibility, poor R LE balance, poor R patellar mobility, decreased tolerance to activity and decreased functional mobility due to Iliotibial band syndrome, right leg (primary encounter diagnosis). His progression has been very good in PT with much better L LE flexibility and endurance. He was only able to ambulate x 100 yards when he started PT and can now walk 3x as far without stopping. He will continue to benefit from skilled PT services in order to address these deficits and reach maximum level of function. Pt continues to respond well to manual interventions. Plan: Continue per plan of care.        POC EXPIRES On:  23  PRECAUTIONS:  None  CO-MORBIDITES:  Concussion  PERSONAL FACTORS:  None      Manuals HEP    TFM R distal ITB supine  15' + L 15' + L 10' + L 15+L 15+L   Patellar mobs R  5min 5min 4' 4' 5'   Quad stretch R/L   5min 4' 4' 5'            Neuro Re-Ed         Bridges  5" 20 5" 20 5" 20 5" 20 5" 20   S/L abduction R  20+L 20+ L 20+ L 20+L 20 +L            FWD Lunge         LAT Lunge                           Ther Ex        Bike  L5 12' L5 10' L5 10' L5 10' L5 10'   Supine strap R ITB stretch  manual manual S/L EOT manual manual   Supine LTR to L only  10" 10 10" 10 10" 10 10" 10 10" 10   Cross leg stretch to L in supine  20" 5 20" 5 20" 5 20" 5 20" 5   Seated Low Back flexion  10" 5 10" 5 10" 5 10" 5 10" 5   Seated HS stretch L/R  10" 5 ea 10" 5 ea 10" 5 ea 10" 5 10" 5                     Ther Activity                          Gait Training                          Modalities

## 2023-09-20 NOTE — PROGRESS NOTES
Occupational Therapy Daily Note:    Today's date: 2023  Patient name: Loulou Post  : 1970  MRN: [id number]  Referring provider: Kyrie Mcgovern DO  Dx:   Encounter Diagnoses   Name Primary? • History of concussion Yes   • Memory problem    • Anxiety and depression    • PTSD (post-traumatic stress disorder)      Subjective: "things are good"     Objective: Pt engaged in skilled OT treatment session with focus on fxnl cognition, short term memory, healthy coping education and leisure pursuits to increase engagement, endurance, tolerance, and independence with daily ADL and IADL tasks. CPT Code Minutes                                           Task Details        Therapeutic Activity  Mental Health Management:  Pt reporting managing stress better; pt's children have been more consistent with chore completion and caring for the animals, which has helped decrease his stress. Pt cont to have G awareness of triggers and does implement strategies to better manage his mental health. Pt continues to use  mantras, self checks and setting boundaries with his interactions to manage levels of stress and anxiety     RTW: Discussed RTW, job duties and responsibilities. Began discussing methods for organization to assist with anxiety management and memory. Will discuss further once pt returns to work and has clear job expectations. Cognitive Re-training:   Pt engaged in functional task focused on executive functions, planning, and task organization. Pt completed a cognitive scavenger hunt, G task organization and completion. Neuro Re-Ed               Therapeutic Exercise               Manual          HEP           Assessment: Tolerated treatment well. Pt with improving executive functions, improving attn to task and improving carryover of strategies. Pt making G progress towards goals. Patient would benefit from continued skilled OT.     Plan: Continued skilled OT per POC INTERVENTION COMMENTS:  Diagnosis: Memory problem [R41.3]  Precautions: anxiety   Insurance: Payor: Saman Lorenzo / Plan: Saman Lorenzo PPO / Product Type: PPO /   Visits: 27  PN due: 9/27/23

## 2023-09-22 ENCOUNTER — OFFICE VISIT (OUTPATIENT)
Facility: CLINIC | Age: 53
End: 2023-09-22
Payer: COMMERCIAL

## 2023-09-22 ENCOUNTER — APPOINTMENT (OUTPATIENT)
Dept: SPEECH THERAPY | Facility: CLINIC | Age: 53
End: 2023-09-22
Payer: COMMERCIAL

## 2023-09-22 ENCOUNTER — EVALUATION (OUTPATIENT)
Dept: PHYSICAL THERAPY | Facility: CLINIC | Age: 53
End: 2023-09-22
Payer: COMMERCIAL

## 2023-09-22 DIAGNOSIS — Z87.820 HISTORY OF CONCUSSION: Primary | ICD-10-CM

## 2023-09-22 DIAGNOSIS — R26.89 IMBALANCE: ICD-10-CM

## 2023-09-22 DIAGNOSIS — R41.3 MEMORY PROBLEM: ICD-10-CM

## 2023-09-22 DIAGNOSIS — G44.86 CERVICOGENIC HEADACHE: ICD-10-CM

## 2023-09-22 DIAGNOSIS — M76.31 ILIOTIBIAL BAND SYNDROME, RIGHT LEG: Primary | ICD-10-CM

## 2023-09-22 PROCEDURE — 97140 MANUAL THERAPY 1/> REGIONS: CPT | Performed by: PHYSICAL THERAPIST

## 2023-09-22 PROCEDURE — 97110 THERAPEUTIC EXERCISES: CPT

## 2023-09-22 PROCEDURE — 97110 THERAPEUTIC EXERCISES: CPT | Performed by: PHYSICAL THERAPIST

## 2023-09-22 PROCEDURE — 97530 THERAPEUTIC ACTIVITIES: CPT

## 2023-09-22 PROCEDURE — 97140 MANUAL THERAPY 1/> REGIONS: CPT

## 2023-09-22 NOTE — PROGRESS NOTES
Daily Note     Today's date: 2023  Patient name: Felice Zuniga  : 1970  MRN: [id number]  Referring provider: Wendi Jiménez DO  Dx:   Encounter Diagnosis     ICD-10-CM    1. History of concussion  Z87.820       2. Imbalance  R26.89       3. Cervicogenic headache  G44.86           Start Time: 1238  Stop Time: 1315  Total time in clinic (min): 37 minutes    Subjective: Pt reports feeling better, got new job needs to reschedule appts. Feels like he could potentially discontinue therapy soon. Feels like progress is finally sticking. Objective: See treatment diary below      Assessment: Shows improved mobility post manuals as well as pain reduction in neck and scapular region. Continues to demonstrate cervical component into headaches. Less painful cervical and thoracic stretching with improving ROM. Discussed reducing therapy as able as he begins new job. Patient will continue to benefit from skilled PT to neck pain and headaches. Plan: Continue per plan of care. cervical manuals, postural exercises, progress HEP for potential discharge.       POC EXPIRES On:  23  PRECAUTIONS:  None  CO-MORBIDITES:  Concussion  PERSONAL FACTORS:  None      Manuals  8/10 8/16 8/21 8/23 8/30 9/6 9/13 9/15 9/19 9/23   SOR VR AC  AF AF AF AF  AF AF AF JG JG AF   Manual cervical traction VR AC AF AF AF AF AF AF AF JG JG AF   1st rib mob VR        AF      PA mobs (C3-C5) VR        AF      MWM L rot VR              Unilateral PA pressure L side VR              Periscapular TrP VR AC TrP/ STM 10' AF 5' AF 5'  AF 6min total L scap Af 6min total L periscap TrP AF 6min total L periscap TrP AF 5min total L periscap TrP JG 5min total L periscap TrP JG  8min total L periscap TrP AF 6min total L periscap TrP   Neuro Re-Ed               Walking over hurdles w/ foam between               Tandem amb               Walking on foam w/ cone taps               Walking over hidden objects               HT amb   20ft x6 Foam               Agility ladder               Optokinetic training     Driving videos 10 min total          Ther Ex               Pt education               UT stretch               LS stretch   30" x2            Cervical rotation SNAGs x10 5' ea x10 5'' ea x10 5'  x10 5' x10 5'   x10 5'        Scap protraction/retraction               Extension/flexion cervical Towel resistance x20    x10 5'   Flexion + rotation x20  Flexion + rotation x10 Flexion + rotation x10 flexion+rotation x10 Flexion+rot x10   Thoracic rot thoracic ext seated over bolster + rot x10    Dowel OH thoracic rot + sidebend x6 ea    Open book 1/2 kneel x8 ea BL lat stretch standing // bar x10    Supine thoracic ext  x10 + rot    Supine x10 thoracic ext over bolster    BL lat stretch standing // bar x10 thoracic ext x10 supine over bolster Pec stretch 20" x3    Thoracic ext x10 supine over bolster Thoracic ext x10 supine over bolster    Lat stretch unilat 20" x3 ea     Open book 1/2 kneel x8 ea  Lat stretch bilat x10    Bolster thoracic ext x10  Lat stretch unilateral x10, bilat x10    Bolster thoracic ext x10 Lat stretch unilateral x10, bilat x10    Bolster thoracic ext x10 Lat strecth U/L x10, B/L x10    bolster thoracic ext x10 Lat stretch Bilat x20    Bolster thoracic ext x10   Cervical ext SNAGs       x10 5'        Lumbar flexion               Lumbar side bend               Aerobic               Sled push               Ther Activity                                             Gait Training                                             Modalities

## 2023-09-22 NOTE — PROGRESS NOTES
Occupational Therapy Daily Note:    Today's date: 2023  Patient name: Ashish Stevens  : 1970  MRN: [id number]  Referring provider: Lorraine Painter DO  Dx:   Encounter Diagnoses   Name Primary? • History of concussion Yes   • Memory problem      Subjective: pt without complaints     Objective: Pt engaged in skilled OT treatment session with focus on fxnl cognition, short term memory, healthy coping education and leisure pursuits to increase engagement, endurance, tolerance, and independence with daily ADL and IADL tasks. CPT Code Minutes                                           Task Details        Therapeutic Activity  Mental Health Management:  -Pt continues to complete self care checks; noted decreased need for immediate intervention as he is more emotionally stable and better handling daily and unexpected events   - pt with increased awareness of triggers and does implement strategies to better manage his mental health. -Pt continues to use  mantras, self checks and setting boundaries with his interactions to manage levels of stress and anxiety   - better emotional regulation; decreased episodes of agitation and rage; stating he has not had a negative self talk spiral in about a month      Identified the followed continued deficits:  -Anxiety: improving, less intense anxiety episodes, avoiding full blown panic attacks  -Memory: better using strategies; cont with STM and LTM deficits  -Focus: improving but still has difficulty with sustained attn  -Clarity: reports some fogginess with thinking, anabel during times of increased stress or frustration  - Motivation: improving but still challenging  -Organization: improving with use of strategies  -Uncontrollable tics: shaking leg, rubbing eyes; pt with increased awareness of tics to improve management of tics        RTW: pt beginning new job next week.  Discussed early implementation of strategies, anabel organization, to assist with memory and anxiety. Reviewed mental health management and using strategies to manage anxiety during transition into new position. Neuro Re-Ed                 Therapeutic Exercise               Manual          HEP           Assessment: Tolerated treatment well. Pt with G insight to self; improving overall functional cognition and mental health, with G use of strategies. Pt making G progress towards goals. Patient would benefit from continued skilled OT.     Plan: Continued skilled OT per POC     INTERVENTION COMMENTS:  Diagnosis: Memory problem [R41.3]  Precautions: anxiety   Insurance: Payor: Raina Carvajal / Plan: Raina Carvajal PPO / Product Type: PPO /   Visits: 28  PN due: 9/27/23 101.4 F

## 2023-09-22 NOTE — PROGRESS NOTES
PT Re-evaluation    Today's date: 2023  Patient name: Lisa Stringer  : 1970  MRN: [id number]  Referring provider: Marie Murillo DO  Dx:   Encounter Diagnosis     ICD-10-CM    1. Iliotibial band syndrome, right leg  M76.31                      Assessment  Assessment details: Lisa Stringer is a 47 y.o. male who presented to outpatient physical therapy at [facility] with complaints of R posterior hip, lateral thigh, knee and leg pain. He presented with decreased R ITB flexibility, poor R LE balance, poor R patellar mobility, decreased tolerance to activity and decreased functional mobility due to Iliotibial band syndrome, right leg (primary encounter diagnosis). His progression has been very good in PT with much better L LE flexibility and endurance. He was only able to ambulate x 100 yards when he started PT and can now walk up to 15 min. He will continue to benefit from skilled PT services in order to address these deficits and reach maximum level of function but can reduce his frequency to 1x/wk. His main limitation is R patellar mobility. Thank you for the referral!  Impairments: abnormal or restricted ROM, activity intolerance, impaired physical strength and pain with function  Barriers to therapy: None  Understanding of Dx/Px/POC: good  Goals  ST. Independent with HEP in 2 weeks - Met  2. Increase R ITB flexibility to WNL in 3 weeks - Met     LT. Achieve FOTO score of 54/100 in 8 weeks - Met  2. Able to descend steps and hills without R LE pain in 8 weeks - Met  3. Good R patellar mobility in 6 weeks - Mostly Met  4.   No L LE tenderness in 6 weeks (23) - Met    Plan  Patient would benefit from: skilled PT  Planned therapy interventions: ADL retraining, balance/weight bearing training, body mechanics training, flexibility, functional ROM exercises, home exercise program, joint mobilization, manual therapy, neuromuscular re-education, postural training, strengthening, stretching, therapeutic activities, therapeutic exercise and abdominal trunk stabilization  Frequency: 1x week  Duration in weeks: 6  Treatment plan discussed with: patient        Subjective Evaluation    History of Present Illness  Mechanism of injury: Pt reported having R posterior hip, lateral thigh, knee and lateral leg pain since about 30 years ago that started to worsen about 4 months ago. He has hx of a concussion that still limits him. Pt has anxiety. On disability for 5 months from desk work but will be RTW on 23. Was unable to walk down gradual hills or down steps due to pain but that is much better now. Was sleeping poorly due to pain but also sleeping well now. Able to walk x 15 min now vs only 3 min before starting PT. Most pain is R patella. Pt is obese. Recurrent probem    Quality of life: fair    Patient Goals  Patient goals for therapy: return to sport/leisure activities, increased motion, decreased pain and return to work    Pain  Current pain ratin  At best pain ratin  At worst pain ratin  Quality: tight and dull ache  Aggravating factors: walking and stair climbing  Progression: improved    Social Support  Steps to enter house: yes  Stairs in house: yes   Lives in: multiple-level home  Lives with: significant other and young children      Diagnostic Tests  No diagnostic tests performed  Treatments  Previous treatment: physical therapy (accupuncture)  Current treatment: physical therapy        Objective     Palpation   Left   No palpable tenderness to the TFL. Right   No palpable tenderness to the TFL. Right Hip Palpation Comments   TFL: Slight distally aspect. Tenderness     Right Hip   No tenderness in the greater trochanter.      Active Range of Motion   Left Hip   Normal active range of motion    Right Hip   Normal active range of motion  Left Knee   Normal active range of motion    Right Knee   Normal active range of motion    Mobility   Patellar Mobility:     Right Knee   Hypomobile: medial, lateral, superior and inferior     Strength/Myotome Testing     Left Hip   Normal muscle strength    Right Hip   Normal muscle strength    Left Knee   Normal strength    Right Knee   Normal strength    Ambulation     Ambulation: Level Surfaces   Ambulation with assistive device: independent    Ambulation: Stairs   Ascend stairs: independent  Pattern: reciprocal  Railings: without rails  Descend stairs: independent  Pattern: reciprocal  Railings: without rails  Curbs: independent    Observational Gait   Gait: within functional limits       POC EXPIRES On:  11/3/23  PRECAUTIONS:  None  CO-MORBIDITES:  Concussion  PERSONAL FACTORS:  None      Manuals HEP 9/22        TFM L/R distal ITB supine  15'        Patellar mobs R  5'        Quad stretch R  5'                  Neuro Re-Ed          Bridges  5" 20        S/L hip abduction L/R  20 ea                  FWD Lunge          LAT Lunge                              Ther Ex         Bike  L10 10'        Supine LTR to L only 6/16 10" 10        Cross leg stretch to L in supine  20" 5        Seated Low Back flexion  10" 5        Seated HS stretch L/R  10" 5 ea                            Ther Activity                             Gait Training                             Modalities

## 2023-09-26 ENCOUNTER — APPOINTMENT (OUTPATIENT)
Facility: CLINIC | Age: 53
End: 2023-09-26
Payer: COMMERCIAL

## 2023-09-26 ENCOUNTER — APPOINTMENT (OUTPATIENT)
Dept: SPEECH THERAPY | Facility: CLINIC | Age: 53
End: 2023-09-26
Payer: COMMERCIAL

## 2023-09-27 ENCOUNTER — APPOINTMENT (OUTPATIENT)
Dept: PHYSICAL THERAPY | Facility: CLINIC | Age: 53
End: 2023-09-27
Payer: COMMERCIAL

## 2023-09-29 ENCOUNTER — APPOINTMENT (OUTPATIENT)
Dept: SPEECH THERAPY | Facility: CLINIC | Age: 53
End: 2023-09-29
Payer: COMMERCIAL

## 2023-09-29 ENCOUNTER — APPOINTMENT (OUTPATIENT)
Facility: CLINIC | Age: 53
End: 2023-09-29
Payer: COMMERCIAL

## 2023-09-29 ENCOUNTER — APPOINTMENT (OUTPATIENT)
Dept: PHYSICAL THERAPY | Facility: CLINIC | Age: 53
End: 2023-09-29
Payer: COMMERCIAL

## 2023-10-03 ENCOUNTER — APPOINTMENT (OUTPATIENT)
Dept: SPEECH THERAPY | Facility: CLINIC | Age: 53
End: 2023-10-03
Payer: COMMERCIAL

## 2023-10-03 ENCOUNTER — OFFICE VISIT (OUTPATIENT)
Facility: CLINIC | Age: 53
End: 2023-10-03
Payer: COMMERCIAL

## 2023-10-03 ENCOUNTER — OFFICE VISIT (OUTPATIENT)
Dept: PHYSICAL THERAPY | Facility: CLINIC | Age: 53
End: 2023-10-03
Payer: COMMERCIAL

## 2023-10-03 DIAGNOSIS — F32.A ANXIETY AND DEPRESSION: ICD-10-CM

## 2023-10-03 DIAGNOSIS — M76.31 ILIOTIBIAL BAND SYNDROME, RIGHT LEG: Primary | ICD-10-CM

## 2023-10-03 DIAGNOSIS — F41.9 ANXIETY AND DEPRESSION: ICD-10-CM

## 2023-10-03 DIAGNOSIS — F43.10 PTSD (POST-TRAUMATIC STRESS DISORDER): ICD-10-CM

## 2023-10-03 DIAGNOSIS — Z87.820 HISTORY OF CONCUSSION: Primary | ICD-10-CM

## 2023-10-03 DIAGNOSIS — R41.3 MEMORY PROBLEM: ICD-10-CM

## 2023-10-03 DIAGNOSIS — R26.89 IMBALANCE: ICD-10-CM

## 2023-10-03 DIAGNOSIS — G44.86 CERVICOGENIC HEADACHE: ICD-10-CM

## 2023-10-03 PROCEDURE — 97110 THERAPEUTIC EXERCISES: CPT

## 2023-10-03 PROCEDURE — 97530 THERAPEUTIC ACTIVITIES: CPT

## 2023-10-03 PROCEDURE — 97140 MANUAL THERAPY 1/> REGIONS: CPT

## 2023-10-03 NOTE — PROGRESS NOTES
Daily Note     Today's date: 10/3/2023  Patient name: Luz Gomes  : 1970  MRN: [id number]  Referring provider: Tony Yusuf DO  Dx:   Encounter Diagnosis     ICD-10-CM    1. History of concussion  Z87.820       2. Imbalance  R26.89       3. Cervicogenic headache  G44.86           Start Time: 1100  Stop Time: 1145  Total time in clinic (min): 45 minutes    Subjective: Few episodes of dizziness and pressure at end range of rotation. Coming back to midline gives him some dizziness. Feels like overall ROM is good. Objective: See treatment diary below      Assessment:  Pt reports no pain or dizziness going to or from end range cervical ROM. Referral patterns from manuals from shoulder to cervical region as well as posterior head, L > R. Also good response in pain with therapeutic exercises, min cues needed for completion, mostly for proper form. Patient will continue to benefit from skilled PT to neck pain and headaches. Plan: Continue per plan of care. cervical manuals, postural exercises, progress HEP for potential discharge.       POC EXPIRES On:  10/15/23  PRECAUTIONS:  None  CO-MORBIDITES:  Concussion  PERSONAL FACTORS:  None      Manuals 10/3              SOR AF              Manual cervical traction AF              1st rib mob               PA mobs (C3-C5)               MWM L rot               Unilateral PA pressure L side AF supine              Periscapular TrP AF 8 min L periscap prone              Neuro Re-Ed               Walking over hurdles w/ foam between               Tandem amb               Walking on foam w/ cone taps               Walking over hidden objects               HT amb               Foam               Agility ladder               Optokinetic training               Ther Ex               Pt education               UT stretch X10 bolster roll              LS stretch               Cervical rotation SNAGs X10 overpressure              Scap protraction/retraction Extension/flexion cervical Bolster rolls x10              Thoracic rot Extension supine bolster x10              Cervical ext SNAGs                              Lumbar side bend               Aerobic               Sled push               Ther Activity                                             Gait Training                                             Modalities

## 2023-10-03 NOTE — PROGRESS NOTES
Daily Note     Today's date: 10/3/2023  Patient name: Rosana Tirado  : 1970  MRN: [id number]  Referring provider: Jeanne Treadwell DO  Dx:   Encounter Diagnosis     ICD-10-CM    1. Iliotibial band syndrome, right leg  M76.31                      Subjective: Pt reports her R knee is tight and achy. Objective: See treatment diary below  FOTO given (_____)      Assessment: Pt presented to outpatient physical therapy at [facility] with complaints of R posterior hip, lateral thigh, knee and leg pain. He presented with decreased R ITB flexibility, poor R LE balance, poor R patellar mobility, decreased tolerance to activity and decreased functional mobility due to Iliotibial band syndrome, right leg (primary encounter diagnosis). His progression has been very good in PT with much better L LE flexibility and endurance. He was only able to ambulate x 100 yards when he started PT and can now walk up to 15 min. He will continue to benefit from skilled PT services in order to address these deficits and reach maximum level of function but can reduce his frequency to 1x/wk. His main limitation is R patellar mobility. Pt will be ready to be d/c soon, due to significant increased flexibility and comfort. Plan: Continue per plan of care.       POC EXPIRES On:  11/3/23  PRECAUTIONS:  None  CO-MORBIDITES:  Concussion  PERSONAL FACTORS:  None      Manuals HEP  10/3       TFM L/R distal ITB supine  15' 15       Patellar mobs R  5' 5       Quad stretch R  5' 5                 Neuro Re-Ed          Bridges  5" 20 5" 20       S/L hip abduction L/R  20 ea 20 ea                 FWD Lunge          LAT Lunge                              Ther Ex         Bike  L10 10' L10 10'       Supine LTR to L only  10" 10 10" 10       Cross leg stretch to L in supine  20" 5 20" 5       Seated Low Back flexion  10" 5 10" 5       Seated HS stretch L/R  10" 5 ea 10" 5 ea                           Ther Activity Gait Training                             Modalities

## 2023-10-03 NOTE — PROGRESS NOTES
Occupational Therapy Daily Note:    Today's date: 10/3/2023  Patient name: Adeline Georges  : 1970  MRN: [id number]  Referring provider: Judy Perez DO  Dx:   Encounter Diagnoses   Name Primary? History of concussion Yes    Memory problem     Anxiety and depression     PTSD (post-traumatic stress disorder)      Subjective: "I'm using my strategies. It's good"     Objective: Pt engaged in skilled OT treatment session with focus on fxnl cognition, short term memory, healthy coping education and leisure pursuits to increase engagement, endurance, tolerance, and independence with daily ADL and IADL tasks. CPT Code Minutes                                           Task Details        Therapeutic Activity  Mental Health Management:  -Pt reporting increased awareness of reactions and triggers; pt able to implement strategies faster to better manage emotional reactions and anxiety.   -Pt continues to use  mantras, self checks and setting boundaries with his interactions to manage levels of stress and anxiety   - better emotional regulation; decreased episodes of agitation and rage        RTW: Focus of session spent on discussing RTW and strategies for organization and mental health management. pt has started his new job last week. Pt reporting he organized his office and organized his work emails and work responsibilities, so as to best manage work tasks as load increases. Pt reporting he is pre-planning how to manage work and life responsibilities during times of work travel. Pt currently balancing work and home life well. Auditory memory test:   Pt with G note taking; able to recall 100% of details immediately and after a 10 min delay, with use of notes. Neuro Re-Ed                 Therapeutic Exercise               Manual          HEP           Assessment: pt doing very well overall. Pt adjusting well to his new job and is I with implementing strategies to manage mental health. Next OT session set for 3 weeks, to allow time at his new job and to identify any areas of difficulty that may need to be addressed in therapy.      Plan: Continued skilled OT per POC     INTERVENTION COMMENTS:  Diagnosis: Memory problem [R41.3]  Precautions: anxiety   Insurance: Payor: Rafa Dominguez / Plan: Rafa Dominguez PPO / Product Type: PPO /   Visits: 29  PN due: 9/27/23

## 2023-10-05 ENCOUNTER — APPOINTMENT (OUTPATIENT)
Dept: PHYSICAL THERAPY | Facility: CLINIC | Age: 53
End: 2023-10-05
Payer: COMMERCIAL

## 2023-10-09 ENCOUNTER — APPOINTMENT (OUTPATIENT)
Dept: PHYSICAL THERAPY | Facility: CLINIC | Age: 53
End: 2023-10-09
Payer: COMMERCIAL

## 2023-10-11 ENCOUNTER — APPOINTMENT (OUTPATIENT)
Dept: PHYSICAL THERAPY | Facility: CLINIC | Age: 53
End: 2023-10-11
Payer: COMMERCIAL

## 2023-10-11 ENCOUNTER — APPOINTMENT (OUTPATIENT)
Facility: CLINIC | Age: 53
End: 2023-10-11
Payer: COMMERCIAL

## 2023-10-24 ENCOUNTER — APPOINTMENT (OUTPATIENT)
Facility: CLINIC | Age: 53
End: 2023-10-24
Payer: COMMERCIAL

## 2023-10-24 NOTE — PROGRESS NOTES
PT Re-Evaluation /progress note    Today's date: 10/24/2023  Patient name: Joseph Erazo  : 1970  MRN: [id number]  Referring provider: Lala Eid DO  Dx:   No diagnosis found. Assessment  Assessment details: Pt is a 47 y.o. male who presents to OP PT for IE following referral for h/o concussion. Florence Stover reports improvement in his headaches over the past month. He feels he has regressed in his cervical ROM + pain due to the stressors in his life and he has not been able to perform his HEP as much as he would prefer. Upon testing, he continues to show cervical stiffness and pain with movement. He is also continuing to experience imbalance with head movement while ambulating, this is improved while static and when bending over where previously he he would have imbalance. New goals added to goals he is progressing toward to reflect updated plan of care. Impairments: abnormal or restricted ROM, abnormal movement, activity intolerance, impaired balance, impaired physical strength, lacks appropriate home exercise program, pain with function and poor posture     Symptom irritability: moderateUnderstanding of Dx/Px/POC: good   Prognosis: good    Goals  STGs (to be achieved w/i 2-4 weeks)  1. Will obtain referral for ortho PT for R hip/knee pain. - met    LTGs (to be achieved within 6-8 weeks)   1. Pt will be I with HEP at d/c to promote PT carry-over. 2. Pt will meet FOTO predicted score. 3. Pt will report improvements in "head pressure", no greater than 2/10 1-2x week. - met  4. Pt will report increased confidence in balance abilities. - progressing    In 4 weeks pt will:  1. Improve FGA to 29/30 showing improved balance with head turns -  progressing  2. Demonstrate improved cervical ROM by at least 25% to assist with functional tasks - partially met, progressing    In 4 weeks pt will:  1. Improve cervical pain at worst by 2 points to help QoL.   2. Report improved path deviation during walks with head movement      Plan  Patient would benefit from: skilled physical therapy  Planned therapy interventions: joint mobilization, therapeutic activities, therapeutic exercise, neuromuscular re-education and gait training  Frequency: 2x week  Duration in weeks: 8  Plan of Care beginning date: 8/16/2023  Plan of Care expiration date: 10/15/2023  Treatment plan discussed with: patient (SO)      Subjective Evaluation    History of Present Illness  Mechanism of injury: 10/24: Pt returns after hiatus from starting new job. Pt with history of multiple concussions/ head traumas from various mechanisms, with the most recent being approx 6 years ago, when he had a coughing spell, leaned forward, passed out and fell on his head. Pt reports cont progression of symptoms over the last 5 years, with worsening symptoms over the last 6 months. Symptoms include: imbalance, L hand tremor, numbness in R 3 digit of hand, sensitivity to light, and dull pressure of L frontal and parietal regions of head. Also c/o non-PT related symptoms including confusion, memory and executive function deficits, and increased anxiety. Pt is no longer driving 2/2 symptoms. HA/head pressure is worsened by lack of sleep, working on computer, and hard/new tasks. H/o neck and B shoulder issues including subluxing at shoulders. Balance issues began several years ago and are worsening. Nearly falls at least once a week. Denies neuropathy or LE radiculopathy. Does have R hip and knee issues and has to stop and stretch IT several times during 1.5 mile walks he does on several times per week. 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 8/16: Neck + shoulder pain. Headaches not as prominent, sometimes pressure, but can manipulate it/massage to improve headaches. Stiff painful neck, feels 2 steps fwd 1 step back. More brain fog and "blurry". Path deviation when walking especially when turning his head.   Not as many issues balance wise but is still frustrated with his walking. Can traverse slopes. No issues getting in/out car balance wise. Feels PT is helpful for his headaches and neck pain. Pain  Current pain rating: 3  At best pain rating: 3  At worst pain ratin  Location: L posterior neck  Quality: throbbing, sharp and pressure        Objective     Palpation   Left   Hypertonic in the cervical paraspinals, scalenes, sternocleidomastoid, suboccipitals and upper trapezius. Tenderness of the cervical paraspinals, scalenes, sternocleidomastoid, suboccipitals and upper trapezius. Trigger point to scalenes and upper trapezius. Right   Hypertonic in the cervical paraspinals, scalenes, sternocleidomastoid, suboccipitals and upper trapezius. Tenderness of the cervical paraspinals, scalenes, sternocleidomastoid, suboccipitals and upper trapezius. Trigger point to scalenes.      Active Range of Motion   Cervical/Thoracic Spine     Normal active range of motion    Tests   Cervical     Left   Positive cervical flexion-rotation test .   Neuro Exam:     Oculomotor exam   Oculomotor ROM: WNL  Smooth pursuits: within normal limits  Vertical saccades: hypometric  Horizontal saccades: hypometric   Convergence: normal  Dynamic head: VOR testing: blurriness of target @ 1 Hz    Coordination   Heel to shin: left WNL and right WNL  Finger to nose: left dysmetria and right dysmetria (R>L)  Rapid alternating movements: UE WNL and LE impaired         Cervical Range of Motion     Flexion 50% limited    Extension 50% limited     Left Right   Lateral Flexion     Rotation 25% limited 25% limited                       Outcome measure IE    FGA 28/30 28/30 28/30 deviation with L head movement   Kay 50/56                      MCTSIB Number of Seconds    Feet Together, Eyes Open 30 30   Feet Together, Eyes Closed 30 30 R drift   Feet Together, Eyes Open Foam 30 30   Feet Together, Eyes Closed Foam 30 w/ minimal sway 30 R drift mod sway OC EXPIRES On:  10/15/23  PRECAUTIONS:  None  CO-MORBIDITES:  Concussion  PERSONAL FACTORS:  None      Manuals 10/3 10/24             SOR AF              Manual cervical traction AF              1st rib mob               PA mobs (C3-C5)               MWM L rot               Unilateral PA pressure L side AF supine              Periscapular TrP AF 8 min L periscap prone              Neuro Re-Ed               Walking over hurdles w/ foam between               Tandem amb               Walking on foam w/ cone taps               Walking over hidden objects               HT amb               Foam               Agility ladder               Optokinetic training               Ther Ex               Pt education               UT stretch X10 bolster roll              LS stretch               Cervical rotation SNAGs X10 overpressure              Scap protraction/retraction               Extension/flexion cervical Bolster rolls x10              Thoracic rot Extension supine bolster x10              Cervical ext SNAGs                              Lumbar side bend               Aerobic               Sled push               Ther Activity                                             Gait Training                                             Modalities

## 2023-11-01 ENCOUNTER — APPOINTMENT (OUTPATIENT)
Dept: PHYSICAL THERAPY | Facility: CLINIC | Age: 53
End: 2023-11-01
Payer: COMMERCIAL

## 2023-11-06 ENCOUNTER — EVALUATION (OUTPATIENT)
Dept: PHYSICAL THERAPY | Facility: CLINIC | Age: 53
End: 2023-11-06
Payer: COMMERCIAL

## 2023-11-06 DIAGNOSIS — M54.16 LUMBAR RADICULOPATHY: Primary | ICD-10-CM

## 2023-11-06 PROCEDURE — 97110 THERAPEUTIC EXERCISES: CPT

## 2023-11-06 PROCEDURE — 97112 NEUROMUSCULAR REEDUCATION: CPT

## 2023-11-06 PROCEDURE — 97140 MANUAL THERAPY 1/> REGIONS: CPT

## 2023-11-06 NOTE — PROGRESS NOTES
PT Re-evaluation    Today's date: 2023  Patient name: Luz Gomes  : 1970  MRN: [id number]  Referring provider: Tony Yusuf DO  Dx:   Encounter Diagnosis     ICD-10-CM    1. Iliotibial band syndrome, right leg  M76.31                      Assessment  Assessment details: Luz Gomes is a 47 y.o. male who presented to outpatient physical therapy at [facility] with complaints of R posterior hip, lateral thigh, knee and leg pain. He presented with decreased R ITB flexibility, poor R LE balance, poor R patellar mobility, decreased tolerance to activity and decreased functional mobility due to Iliotibial band syndrome, right leg (primary encounter diagnosis). His progression has been very good in PT with much better L LE flexibility and endurance. He was only able to ambulate x 100 yards when he started PT and can now walk up to 30 min. His R patellar mobility is greatly improved. He will continue to benefit from skilled PT services in order to address these deficits and reach maximum level of function but can reduce his frequency to 1x/wk. HE should be ready for D/C within the next few weeks. Thank you for the referral!  Impairments: abnormal or restricted ROM, activity intolerance, impaired physical strength and pain with function  Barriers to therapy: None  Understanding of Dx/Px/POC: good  Goals  ST. Independent with HEP in 2 weeks - Met  2. Increase R ITB flexibility to WNL in 3 weeks - Met     LT. Achieve FOTO score of 54/100 in 8 weeks - Met  2. Able to descend steps and hills without R LE pain in 8 weeks - Met  3. Good R patellar mobility in 6 weeks - Mostly Met  4.   No L LE tenderness in 6 weeks (23) - Met    Plan  Patient would benefit from: skilled PT  Planned therapy interventions: ADL retraining, balance/weight bearing training, body mechanics training, flexibility, functional ROM exercises, home exercise program, joint mobilization, manual therapy, neuromuscular re-education, postural training, strengthening, stretching, therapeutic activities, therapeutic exercise and abdominal trunk stabilization  Frequency: 1x week  Duration in weeks: 4  Treatment plan discussed with: patient        Subjective Evaluation    History of Present Illness  Mechanism of injury: Pt reported having R posterior hip, lateral thigh, knee and lateral leg pain since about 30 years ago that started to worsen about 6 months ago. He has hx of a concussion that still limits him. Pt has anxiety. 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 He missed some time in PT due to job training but was able to maintain his functional status during that time. He was unable to walk down gradual hills or down steps due to pain but that is much better now. Was sleeping poorly due to pain but also sleeping well now. Able to walk x 30 min now vs only 3 min before starting PT and only 15 min 2 months ago. Most pain is R patella. Pt is obese. Recurrent probem    Quality of life: fair    Patient Goals  Patient goals for therapy: return to sport/leisure activities, increased motion, decreased pain and return to work    Pain  Current pain ratin  At best pain ratin  At worst pain rating: 3  Quality: tight and dull ache  Aggravating factors: walking and stair climbing  Progression: improved    Social Support  Steps to enter house: yes  Stairs in house: yes   Lives in: multiple-level home  Lives with: significant other and young children      Diagnostic Tests  No diagnostic tests performed  Treatments  Previous treatment: physical therapy (accupuncture)  Current treatment: physical therapy        Objective     Palpation   Left   No palpable tenderness to the TFL. Right   No palpable tenderness to the TFL. Right Hip Palpation Comments   TFL: Slight distally aspect. Tenderness     Right Hip   No tenderness in the greater trochanter.      Active Range of Motion Left Hip   Normal active range of motion    Right Hip   Normal active range of motion  Left Knee   Normal active range of motion    Right Knee   Normal active range of motion    Mobility   Patellar Mobility:     Right Knee   Hypomobile slightly: medial, lateral, superior and inferior     Strength/Myotome Testing     Left Hip   Normal muscle strength    Right Hip   Normal muscle strength    Left Knee   Normal strength    Right Knee   Normal strength    Ambulation     Ambulation: Level Surfaces   Ambulation with assistive device: independent    Ambulation: Stairs   Ascend stairs: independent  Pattern: reciprocal  Railings: without rails  Descend stairs: independent  Pattern: reciprocal  Railings: without rails  Curbs: independent    Observational Gait   Gait: within functional limits       POC EXPIRES On:  12/4/23  PRECAUTIONS:  None  CO-MORBIDITES:  Concussion  PERSONAL FACTORS:  None      Manuals HEP 9/22 10/3 11/6      TFM L/R distal ITB supine  15' 15 15      Patellar mobs R  5' 5 5      Quad stretch R  5' 5 5                Neuro Re-Ed          Bridges  5" 20 5" 20       S/L hip abduction L/R  20 ea 20 ea                 FWD Lunge          LAT Lunge                              Ther Ex         Bike  L10 10' L10 10' Upright bike 10'      Quad stretch bilateral    10" 5      Supine LTR to L only 6/16 10" 10 10" 10       Cross leg stretch to L in supine  20" 5 20" 5       Seated Low Back flexion  10" 5 10" 5       Seated HS stretch L/R  10" 5 ea 10" 5 ea                           Ther Activity                             Gait Training                             Modalities

## 2023-11-07 ENCOUNTER — APPOINTMENT (OUTPATIENT)
Dept: PHYSICAL THERAPY | Facility: CLINIC | Age: 53
End: 2023-11-07
Payer: COMMERCIAL

## 2023-11-07 ENCOUNTER — OFFICE VISIT (OUTPATIENT)
Facility: CLINIC | Age: 53
End: 2023-11-07
Payer: COMMERCIAL

## 2023-11-07 DIAGNOSIS — R41.3 MEMORY PROBLEM: Primary | ICD-10-CM

## 2023-11-07 DIAGNOSIS — Z87.820 HISTORY OF CONCUSSION: ICD-10-CM

## 2023-11-07 DIAGNOSIS — R26.89 IMBALANCE: ICD-10-CM

## 2023-11-07 DIAGNOSIS — F32.A ANXIETY AND DEPRESSION: ICD-10-CM

## 2023-11-07 DIAGNOSIS — G44.86 CERVICOGENIC HEADACHE: ICD-10-CM

## 2023-11-07 DIAGNOSIS — F41.9 ANXIETY AND DEPRESSION: ICD-10-CM

## 2023-11-07 DIAGNOSIS — Z87.820 HISTORY OF CONCUSSION: Primary | ICD-10-CM

## 2023-11-07 DIAGNOSIS — F43.10 POSTTRAUMATIC STRESS DISORDER: ICD-10-CM

## 2023-11-07 PROCEDURE — 97164 PT RE-EVAL EST PLAN CARE: CPT

## 2023-11-07 PROCEDURE — 97168 OT RE-EVAL EST PLAN CARE: CPT

## 2023-11-07 PROCEDURE — 97140 MANUAL THERAPY 1/> REGIONS: CPT

## 2023-11-07 PROCEDURE — 97530 THERAPEUTIC ACTIVITIES: CPT

## 2023-11-07 NOTE — PROGRESS NOTES
PT Re-Evaluation /progress note    Today's date: 2023  Patient name: Raiza Negrete  : 1970  MRN: [id number]  Referring provider: Elba Rothman DO  Dx:   Encounter Diagnosis     ICD-10-CM    1. History of concussion  Z87.820       2. Imbalance  R26.89       3. Cervicogenic headache  G44.86             Start Time: 1633  Stop Time: 1714  Total time in clinic (min): 41 minutes    Assessment  Assessment details: Pt is a 47 y.o. male who presents to OP PT for IE following referral for h/o concussion & cervicogenic headache. Pt continues to show cervical restrictions as noted with pain completing ADLs and work-related activities. He reports an improvement in his pain, however, there are still motions of his neck that trigger pain. He is now experiencing parasthesias in different areas of his head and neck which was not previously happening. Triggers seem to be movement of the head/neck or bracing. He improved good cervical and thoracic range of motion. He also reports improved balance and ability to navigate different terrains/slopes without dizziness or imbalance. Has neurology follow up next week to determine further intervention. Educated on referral patterns that can happen from cervical spine. Goals updated to reflect updated plan of care. Continue per plan of care. Impairments: abnormal or restricted ROM, abnormal movement, activity intolerance, impaired balance, impaired physical strength, lacks appropriate home exercise program, pain with function and poor posture     Symptom irritability: moderateUnderstanding of Dx/Px/POC: good   Prognosis: good    Goals  Long term goals: In 8 weeks, patient will:  1. Be independent with postural corrections at new work station  2. Report reduced n/t sensation in face/neck/upper trap area  3.  Report ability to independently manage cervical spine    Plan  Patient would benefit from: skilled physical therapy  Planned therapy interventions: joint mobilization, therapeutic activities, therapeutic exercise, neuromuscular re-education and gait training  Frequency: 2x week  Duration in weeks: 8  Plan of Care beginning date: 11/7/2023  Plan of Care expiration date: 1/6/2024  Treatment plan discussed with: patient (SO)      Subjective Evaluation    History of Present Illness  Mechanism of injury: 11/7: Pt returns from hiatus after new job. Feels like he has retained improvement. Feeling good for most part. At times experirences cervical pain and discomfort with prolonged posture. Also experiencing different sensations in head/neck region which was not previously happening. Feels like he needs more stability to stop PT. Working on his exercises and stretches pretty regularly. Pt with history of multiple concussions/ head traumas from various mechanisms, with the most recent being approx 6 years ago, when he had a coughing spell, leaned forward, passed out and fell on his head. Pt reports cont progression of symptoms over the last 5 years, with worsening symptoms over the last 6 months. Symptoms include: imbalance, L hand tremor, numbness in R 3 digit of hand, sensitivity to light, and dull pressure of L frontal and parietal regions of head. Also c/o non-PT related symptoms including confusion, memory and executive function deficits, and increased anxiety. Pt is no longer driving 2/2 symptoms. HA/head pressure is worsened by lack of sleep, working on computer, and hard/new tasks. H/o neck and B shoulder issues including subluxing at shoulders. Balance issues began several years ago and are worsening. Nearly falls at least once a week. Denies neuropathy or LE radiculopathy. Does have R hip and knee issues and has to stop and stretch IT several times during 1.5 mile walks he does on several times per week. 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 8/16: Neck + shoulder pain.  Headaches not as prominent, sometimes pressure, but can manipulate it/massage to improve headaches. Stiff painful neck, feels 2 steps fwd 1 step back. More brain fog and "blurry". Path deviation when walking especially when turning his head. Not as many issues balance wise but is still frustrated with his walking. Can traverse slopes. No issues getting in/out car balance wise. Feels PT is helpful for his headaches and neck pain. Pain  Current pain ratin  At best pain ratin  At worst pain ratin  Location: L posterior neck  Quality: throbbing, sharp and pressure        Objective     Palpation   Left   Hypertonic in the cervical paraspinals, scalenes, sternocleidomastoid, suboccipitals and upper trapezius. Tenderness of the cervical paraspinals, scalenes, sternocleidomastoid, suboccipitals and upper trapezius. Trigger point to scalenes and upper trapezius. Right   Hypertonic in the cervical paraspinals, scalenes, sternocleidomastoid, suboccipitals and upper trapezius. Tenderness of the cervical paraspinals, scalenes, sternocleidomastoid, suboccipitals and upper trapezius. Trigger point to scalenes.      Active Range of Motion   Cervical/Thoracic Spine     Normal active range of motion    Tests   Cervical     Left   Positive cervical flexion-rotation test .   Neuro Exam:     Oculomotor exam   Oculomotor ROM: WNL  Smooth pursuits: within normal limits  Vertical saccades: hypometric  Horizontal saccades: hypometric   Convergence: normal  Dynamic head: VOR testing: blurriness of target @ 1 Hz    Coordination   Heel to shin: left WNL and right WNL  Finger to nose: left dysmetria and right dysmetria (R>L)  Rapid alternating movements: UE WNL and LE impaired         Cervical Range of Motion     Flexion 25% limited    Extension 25% limited     Left Right   Lateral Flexion     Rotation WNL WNL                       Outcome measure IE    FGA  deviation with L head movement  No deviations with head movement   Kay 50/ MCTSIB Number of Seconds    Feet Together, Eyes Open 30 30   Feet Together, Eyes Closed 30 30 R drift   Feet Together, Eyes Open Foam 30 30   Feet Together, Eyes Closed Foam 30 w/ minimal sway 30 R drift mod sway            OC EXPIRES On:  12/7/23  PRECAUTIONS:  None  CO-MORBIDITES:  Concussion  PERSONAL FACTORS:  None      Manuals 10/3 10/24 11/7            SOR AF  AF            Manual cervical traction AF  AF            1st rib mob               PA mobs (C3-C5)               MWM L rot               Unilateral PA pressure L side AF supine  AF supine            Periscapular TrP AF 8 min L periscap prone  AF 8min L periscap            Neuro Re-Ed               Walking over hurdles w/ foam between               Tandem amb               Walking on foam w/ cone taps               Walking over hidden objects               HT amb               Foam               Agility ladder               Optokinetic training               Ther Ex               Pt education               UT stretch X10 bolster roll  X10 bolster roll            LS stretch   X10 bolster roll            Cervical rotation SNAGs X10 overpressure              Scap protraction/retraction               Extension/flexion cervical Bolster rolls x10  Bolster roll x10            Thoracic rot Extension supine bolster x10  Extension supine bolster x10    1/2 kneel wall rotation x10 ea            Cervical ext SNAGs                              Lumbar side bend               Aerobic               Sled push               Ther Activity                                             Gait Training                                             Modalities
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
## 2023-11-09 ENCOUNTER — APPOINTMENT (OUTPATIENT)
Facility: CLINIC | Age: 53
End: 2023-11-09
Payer: COMMERCIAL

## 2023-11-09 ENCOUNTER — OFFICE VISIT (OUTPATIENT)
Facility: CLINIC | Age: 53
End: 2023-11-09
Payer: COMMERCIAL

## 2023-11-09 DIAGNOSIS — Z87.820 HISTORY OF CONCUSSION: Primary | ICD-10-CM

## 2023-11-09 DIAGNOSIS — R26.89 IMBALANCE: ICD-10-CM

## 2023-11-09 DIAGNOSIS — G44.86 CERVICOGENIC HEADACHE: ICD-10-CM

## 2023-11-09 PROCEDURE — 97110 THERAPEUTIC EXERCISES: CPT

## 2023-11-09 PROCEDURE — 97140 MANUAL THERAPY 1/> REGIONS: CPT

## 2023-11-09 NOTE — PROGRESS NOTES
Daily Note     Today's date: 2023  Patient name: Yuliana Wong  : 1970  MRN: [id number]  Referring provider: Pavithra Christianson DO  Dx:   Encounter Diagnosis     ICD-10-CM    1. History of concussion  Z87.820       2. Imbalance  R26.89       3. Cervicogenic headache  G44.86           Start Time: 1508  Stop Time: 1545  Total time in clinic (min): 37 minutes    Subjective: Pt reports nothing new. Still experiencing parasthesias in L side of head and neck. Not so much in shoulder since last here. L sided neck pain present today. Objective: See treatment diary below      Assessment:  Good response to cervical manuals with ROM and pain reduction. No instances of paraesthesias during exercises and manuals. Min cues for form with exercises. Patient would benefit from continued PT to improve cervical symptoms. Plan: Continue per plan of care.       OC EXPIRES On:  23  PRECAUTIONS:  None  CO-MORBIDITES:  Concussion  PERSONAL FACTORS:  None      Manuals 10/3 10/24 11/7 11/9           SOR AF  AF AF           Manual cervical traction AF  AF AF           1st rib mob               PA mobs (C3-C5)               MWM L rot               Unilateral PA pressure L side AF supine  AF supine AF supine           Periscapular TrP AF 8 min L periscap prone  AF 8min L periscap AF 6min L periscap           Neuro Re-Ed               Walking over hurdles w/ foam between               Tandem amb               Walking on foam w/ cone taps               Walking over hidden objects               HT amb               Foam               Agility ladder               Optokinetic training               Ther Ex               Pt education               UT stretch X10 bolster roll  X10 bolster roll            LS stretch   X10 bolster roll            Lat stretch    Bilat x10 ea           Cervical rotation SNAGs X10 overpressure              Scap protraction/retraction               Extension/flexion cervical Bolster rolls x10 Bolster roll x10 Bolster roll x10           Thoracic rot Extension supine bolster x10  Extension supine bolster x10    1/2 kneel wall rotation x10 ea Extension supine bolster 10             Cervical ext SNAGs                              Lumbar side bend               Aerobic               Sled push               Ther Activity                                             Gait Training                                             Modalities

## 2023-11-10 ENCOUNTER — APPOINTMENT (OUTPATIENT)
Dept: PHYSICAL THERAPY | Facility: CLINIC | Age: 53
End: 2023-11-10
Payer: COMMERCIAL

## 2023-11-14 ENCOUNTER — OFFICE VISIT (OUTPATIENT)
Dept: NEUROLOGY | Facility: CLINIC | Age: 53
End: 2023-11-14
Payer: COMMERCIAL

## 2023-11-14 ENCOUNTER — OFFICE VISIT (OUTPATIENT)
Facility: CLINIC | Age: 53
End: 2023-11-14
Payer: COMMERCIAL

## 2023-11-14 VITALS
HEIGHT: 73 IN | OXYGEN SATURATION: 97 % | BODY MASS INDEX: 38.94 KG/M2 | TEMPERATURE: 98.3 F | SYSTOLIC BLOOD PRESSURE: 116 MMHG | DIASTOLIC BLOOD PRESSURE: 88 MMHG | HEART RATE: 72 BPM | WEIGHT: 293.8 LBS

## 2023-11-14 DIAGNOSIS — G44.86 CERVICOGENIC HEADACHE: ICD-10-CM

## 2023-11-14 DIAGNOSIS — R47.89 WORD FINDING DIFFICULTY: ICD-10-CM

## 2023-11-14 DIAGNOSIS — F32.A ANXIETY AND DEPRESSION: ICD-10-CM

## 2023-11-14 DIAGNOSIS — Z87.820 HISTORY OF CONCUSSION: Primary | ICD-10-CM

## 2023-11-14 DIAGNOSIS — Z87.820 HISTORY OF CONCUSSION: ICD-10-CM

## 2023-11-14 DIAGNOSIS — R26.89 IMBALANCE: ICD-10-CM

## 2023-11-14 DIAGNOSIS — F41.9 ANXIETY AND DEPRESSION: ICD-10-CM

## 2023-11-14 DIAGNOSIS — F43.10 PTSD (POST-TRAUMATIC STRESS DISORDER): ICD-10-CM

## 2023-11-14 DIAGNOSIS — G47.33 OBSTRUCTIVE SLEEP APNEA (ADULT) (PEDIATRIC): ICD-10-CM

## 2023-11-14 DIAGNOSIS — E66.9 OBESITY (BMI 30-39.9): ICD-10-CM

## 2023-11-14 DIAGNOSIS — R41.3 MEMORY PROBLEM: Primary | ICD-10-CM

## 2023-11-14 DIAGNOSIS — F43.10 POSTTRAUMATIC STRESS DISORDER: ICD-10-CM

## 2023-11-14 DIAGNOSIS — R41.3 MEMORY PROBLEM: ICD-10-CM

## 2023-11-14 DIAGNOSIS — G43.009 MIGRAINE WITHOUT AURA AND WITHOUT STATUS MIGRAINOSUS, NOT INTRACTABLE: ICD-10-CM

## 2023-11-14 PROCEDURE — 97110 THERAPEUTIC EXERCISES: CPT

## 2023-11-14 PROCEDURE — 99215 OFFICE O/P EST HI 40 MIN: CPT | Performed by: STUDENT IN AN ORGANIZED HEALTH CARE EDUCATION/TRAINING PROGRAM

## 2023-11-14 PROCEDURE — 97530 THERAPEUTIC ACTIVITIES: CPT

## 2023-11-14 PROCEDURE — 97140 MANUAL THERAPY 1/> REGIONS: CPT
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
## 2023-11-14 NOTE — PROGRESS NOTES
Review of Systems   Constitutional:  Negative for appetite change, fatigue and fever. HENT: Negative. Negative for hearing loss, tinnitus, trouble swallowing and voice change. Eyes: Negative. Negative for photophobia, pain and visual disturbance. Respiratory: Negative. Negative for shortness of breath. Cardiovascular: Negative. Negative for palpitations. Gastrointestinal: Negative. Negative for nausea and vomiting. Endocrine: Negative. Negative for cold intolerance. Genitourinary: Negative. Negative for dysuria, frequency and urgency. Musculoskeletal:  Negative for back pain, gait problem, myalgias and neck pain. Skin: Negative. Negative for rash. Allergic/Immunologic: Negative. Neurological:  Positive for speech difficulty (word finding) and numbness (neck). Negative for dizziness, tremors, seizures, syncope, facial asymmetry, weakness, light-headedness and headaches. Hematological: Negative. Does not bruise/bleed easily. Psychiatric/Behavioral: Negative. Negative for confusion, hallucinations and sleep disturbance. Memory   All other systems reviewed and are negative. Since your last visit are your headaches Improved    Any change to the headache type? no    What is your current headache frequency: 0    Are you taking your current medications as prescribed? no    If no, why not?  Topamax - side effects    Do you have any side effects? no    How may days per week do you take an abortive medicine? 0/7

## 2023-11-14 NOTE — PROGRESS NOTES
Daily Note     Today's date: 2023  Patient name: Elias Rivera  : 1970  MRN: [id number]  Referring provider: Joe Shaw DO  Dx:   Encounter Diagnosis     ICD-10-CM    1. History of concussion  Z87.820       2. Imbalance  R26.89       3. Cervicogenic headache  G44.86           Start Time: 1633  Stop Time: 7409  Total time in clinic (min): 41 minutes    Subjective: Pt reports went to neuro today. Possible occipital neuragia causing symptoms of n/t in face and head. Things going fairly. Objective: See treatment diary below      Assessment:  Occasional reproduction of parasthesias but unable to locate consistent trigger. Not reported to be reproduced by cervical manuals but patient reports feeling less stiff. Improved soft tissue mobility post manuals and improved thoracic and cervical rotation. Patient would benefit from continued PT to improve cervical symptoms. Plan: Continue per plan of care.       OC EXPIRES On:  23  PRECAUTIONS:  None  CO-MORBIDITES:  Concussion  PERSONAL FACTORS:  None      Manuals 10/3 10/24 11/7 11/9 11/14          SOR AF  AF AF AF          Manual cervical traction AF  AF AF AF          1st rib mob               PA mobs (C3-C5)               MWM L rot               Unilateral PA pressure L side AF supine  AF supine AF supine AF supine          Periscapular TrP AF 8 min L periscap prone  AF 8min L periscap AF 6min L periscap AF 6min L periscap          Neuro Re-Ed               Walking over hurdles w/ foam between               Tandem amb               Walking on foam w/ cone taps               Walking over hidden objects               HT amb               Foam               Agility ladder               Optokinetic training               Ther Ex               Pt education               UT stretch X10 bolster roll  X10 bolster roll  X2 30"           LS stretch   X10 bolster roll  X2 30"           Modified push up     X10 // bar          Lat stretch    Bilat x10 ea Bilat x10 ea          Cervical rotation SNAGs X10 overpressure              Scap protraction/retraction               Extension/flexion cervical Bolster rolls x10  Bolster roll x10 Bolster roll x10           Thoracic rot Extension supine bolster x10  Extension supine bolster x10    1/2 kneel wall rotation x10 ea Extension supine bolster 10   X10 ea direction standing          Cervical ext SNAGs                              Lumbar side bend               Aerobic               Sled push               Ther Activity                                             Gait Training                                             Modalities 0

## 2023-11-14 NOTE — PATIENT INSTRUCTIONS
Additional Testing or Referrals:   Neuropsychology evaluation     Headache/migraine treatment:   Abortive medications (for immediate treatment of a headache): Ok to take ibuprofen or acetaminophen for headaches, but try to limit the amount and frequency that you are taking to avoid medication overuse/rebound headache. Ideally no more than 3 days per week. Prescription Abortive  Rizatriptan (MAXALT) 10 MG tablet; Take 1 tablet (10 mg total) by mouth once as needed for migraine May repeat in 2 hours if needed. Max 2/24 hours, 10/month. Discussed proper use, possible side effects and risks. Lifestyle Recommendations:  - Maintain good sleep hygiene. Going to bed and waking up at consistent times, avoiding excessive daytime naps, avoiding caffeinated beverages in the evening, avoid excessive stimulation in the evening and generally using bed primarily for sleeping. One hour before bedtime would recommend turning lights down lower, decreasing your activity (may read quietly, listen to music at a low volume). When you get into bed, should eliminate all technology (no texting, emailing, playing with your phone, iPad or tablet in bed). - Maintain good hydration. Drink  2L of fluid a day (4 typical small water bottles)  - Maintain good nutrition. In particular don't skip meals and eat balanced meals regularly. Education and Follow-up  - Please contact us if any questions or concerns arise. Of course, try to protect yourself from head injuries, and if any new concerning symptoms or significant blow to the head or body go to the emergency department.   - Follow up in 6 months

## 2023-11-14 NOTE — PROGRESS NOTES
Occupational Therapy Daily Note:    Today's date: 2023  Patient name: Rupinder Sanabria  : 1970  MRN: [id number]  Referring provider: Carmella Jara DO  Dx:   Encounter Diagnoses   Name Primary? Memory problem Yes    History of concussion     Anxiety and depression     Posttraumatic stress disorder      Subjective: "my memory is still bad but with the strategies, it is manageable"     Objective: Pt engaged in skilled OT treatment session with focus on fxnl cognition, short term memory, healthy coping education and leisure pursuits to increase engagement, endurance, tolerance, and independence with daily ADL and IADL tasks. CPT Code Minutes                                           Task Details        Therapeutic Activity  Trails A: 20 sec, no errors  Trails B: 49 sec, no errors  Mesulam symbol cancellation task: 1 min 35 sec, no missed, no errors         RTW: pt cont to be successful with RTW and is I with using strategies for mental health management and to manage memory deficits. Discussed continuing OT weekly and then every other week to ensure continued success with RTW as work responsibilities cont to increase. Pt currently with good work life balance. Neuro Re-Ed                 Therapeutic Exercise               Manual          HEP           Assessment: pt continues to do well at work; is using strategies and organization to manage memory deficits, as well as mental health. Pt to benefit from cont skilled OT to ensure successful transition back to work as he assumes full job responsibilities.      Plan: Continued skilled OT per POC     INTERVENTION COMMENTS:  Diagnosis: Memory problem [R41.3]  Precautions: anxiety   Insurance: Payor: [employer] / Plan: Humberto Schneider PPO / Product Type: PPO /   Visits: 31  PN due: 23

## 2023-11-16 ENCOUNTER — OFFICE VISIT (OUTPATIENT)
Dept: PHYSICAL THERAPY | Facility: CLINIC | Age: 53
End: 2023-11-16
Payer: COMMERCIAL

## 2023-11-16 ENCOUNTER — OFFICE VISIT (OUTPATIENT)
Facility: CLINIC | Age: 53
End: 2023-11-16
Payer: COMMERCIAL

## 2023-11-16 DIAGNOSIS — M54.16 LUMBAR RADICULOPATHY: Primary | ICD-10-CM

## 2023-11-16 DIAGNOSIS — Z87.820 HISTORY OF CONCUSSION: Primary | ICD-10-CM

## 2023-11-16 DIAGNOSIS — R26.89 IMBALANCE: ICD-10-CM

## 2023-11-16 DIAGNOSIS — G44.86 CERVICOGENIC HEADACHE: ICD-10-CM

## 2023-11-16 PROCEDURE — 97110 THERAPEUTIC EXERCISES: CPT

## 2023-11-16 PROCEDURE — 97140 MANUAL THERAPY 1/> REGIONS: CPT

## 2023-11-16 NOTE — PROGRESS NOTES
Daily Note     Today's date: 2023  Patient name: Lisa Stringer  : 1970  MRN: [id number]  Referring provider: Marie Murillo DO  Dx:   Encounter Diagnosis     ICD-10-CM    1. Lumbar radiculopathy  M54.16                      Subjective: Pt states he wants to keep coming. Objective: See treatment diary below      Assessment: Pt presented to outpatient physical therapy at [facility] with complaints of R posterior hip, lateral thigh, knee and leg pain. He presented with decreased R ITB flexibility, poor R LE balance, poor R patellar mobility, decreased tolerance to activity and decreased functional mobility due to Iliotibial band syndrome, right leg (primary encounter diagnosis). His progression has been very good in PT with much better L LE flexibility and endurance. He was only able to ambulate x 100 yards when he started PT and can now walk up to 30 min. His R patellar mobility is greatly improved. He will continue to benefit from skilled PT services in order to address these deficits and reach maximum level of function but can reduce his frequency to 1x/wk. HE should be ready for D/C within the next few weeks. Pt continues to tolerated very well to manuals. Pt flexibility continues to improved. Plan: Continue per plan of care.     POC EXPIRES On:  23  PRECAUTIONS:  None  CO-MORBIDITES:  Concussion  PERSONAL FACTORS:  None      Manuals HEP 9/22 10/3 11/6 11/16     TFM L/R distal ITB supine  15' 15 15 15     Patellar mobs R  5' 5 5 5     Quad stretch R  5' 5 5 5               Neuro Re-Ed          Bridges  5" 20 5" 20       S/L hip abduction L/R  20 ea 20 ea                 FWD Lunge          LAT Lunge                              Ther Ex         Bike  L10 10' L10 10' Upright bike 10' Upright bike 10'     Quad stretch bilateral    10" 5 10" 5     Supine LTR to L only  10" 10 10" 10  10" 10     Cross leg stretch to L in supine  20" 5 20" 5  20" 5     Seated Low Back flexion 10" 5 10" 5  10" 5     Seated HS stretch L/R  10" 5 ea 10" 5 ea  10" 5                         Ther Activity                             Gait Training                             Modalities

## 2023-11-16 NOTE — PROGRESS NOTES
Daily Note     Today's date: 2023  Patient name: Rosi Mazariegos  : 1970  MRN: [id number]  Referring provider: Mick Bonner DO  Dx:   Encounter Diagnosis     ICD-10-CM    1. History of concussion  Z87.820       2. Imbalance  R26.89       3. Cervicogenic headache  G44.86           Start Time: 1332  Stop Time: 1411  Total time in clinic (min): 39 minutes    Subjective: Pt reports nothing particularly new. Still does not notice rhyme or reason to n/t in head/neck. Objective: See treatment diary below      Assessment:  Reports some parasthesias throughout session during therapeutic exercise. Reproduction during manual therapy with PROM repeated L rotation, reduction with R rotation. Reports good repsonse to stiffness post manuals. Discussed incoporating exercises more into work day to frequently change posture and not stay in prolonged position, pt in agreement. Patient would benefit from continued PT to improve cervical symptoms. Plan: Continue per plan of care.       OC EXPIRES On:  23  PRECAUTIONS:  None  CO-MORBIDITES:  Concussion  PERSONAL FACTORS:  None      Manuals 10/3 10/24 11/7 11/9 11/14 11/16         SOR AF  AF AF AF AF         Manual cervical traction AF  AF AF AF AF         1st rib mob               PA mobs (C3-C5)               MWM L rot               Unilateral PA pressure L side AF supine  AF supine AF supine AF supine AF supine         Periscapular TrP AF 8 min L periscap prone  AF 8min L periscap AF 6min L periscap AF 6min L periscap AF 6min L periscap         Neuro Re-Ed               Walking over hurdles w/ foam between               Tandem amb               Walking on foam w/ cone taps               Walking over hidden objects               HT amb               Foam               Agility ladder               Optokinetic training               Ther Ex               Pt education               UT stretch X10 bolster roll  X10 bolster roll  X2 30"           LS stretch X10 bolster roll  X2 30"           Modified push up     X10 // bar          Lat stretch    Bilat x10 ea Bilat x10 ea Bilat x10 ea         Cervical rotation SNAGs X10 overpressure              Scap protraction/retraction      X10 from // bar         Extension/flexion cervical Bolster rolls x10  Bolster roll x10 Bolster roll x10  Bolster roll x10 ea direction         Thoracic rot Extension supine bolster x10  Extension supine bolster x10    1/2 kneel wall rotation x10 ea Extension supine bolster 10   X10 ea direction standing X10 ea direction         Cervical ext SNAGs                              Lumbar side bend               Aerobic               Sled push               Ther Activity                                             Gait Training                                             Modalities

## 2023-11-21 ENCOUNTER — OFFICE VISIT (OUTPATIENT)
Facility: CLINIC | Age: 53
End: 2023-11-21
Payer: COMMERCIAL

## 2023-11-21 ENCOUNTER — OFFICE VISIT (OUTPATIENT)
Dept: PHYSICAL THERAPY | Facility: CLINIC | Age: 53
End: 2023-11-21
Payer: COMMERCIAL

## 2023-11-21 DIAGNOSIS — M54.16 LUMBAR RADICULOPATHY: Primary | ICD-10-CM

## 2023-11-21 DIAGNOSIS — R26.89 IMBALANCE: ICD-10-CM

## 2023-11-21 DIAGNOSIS — F43.10 POSTTRAUMATIC STRESS DISORDER: ICD-10-CM

## 2023-11-21 DIAGNOSIS — G44.86 CERVICOGENIC HEADACHE: ICD-10-CM

## 2023-11-21 DIAGNOSIS — F41.9 ANXIETY AND DEPRESSION: ICD-10-CM

## 2023-11-21 DIAGNOSIS — Z87.820 HISTORY OF CONCUSSION: Primary | ICD-10-CM

## 2023-11-21 DIAGNOSIS — Z87.820 HISTORY OF CONCUSSION: ICD-10-CM

## 2023-11-21 DIAGNOSIS — R41.3 MEMORY PROBLEM: Primary | ICD-10-CM

## 2023-11-21 DIAGNOSIS — F32.A ANXIETY AND DEPRESSION: ICD-10-CM

## 2023-11-21 PROCEDURE — 97110 THERAPEUTIC EXERCISES: CPT

## 2023-11-21 PROCEDURE — 97140 MANUAL THERAPY 1/> REGIONS: CPT

## 2023-11-21 PROCEDURE — 97530 THERAPEUTIC ACTIVITIES: CPT

## 2023-11-21 PROCEDURE — 97112 NEUROMUSCULAR REEDUCATION: CPT

## 2023-11-21 NOTE — PROGRESS NOTES
Occupational Therapy Daily Note:    Today's date: 2023  Patient name: Candace Gibson  : 1970  MRN: [id number]  Referring provider: Joshua Viveros DO  Dx:   Encounter Diagnoses   Name Primary? Memory problem Yes    History of concussion     Anxiety and depression     Posttraumatic stress disorder      Subjective: "I have to be more consistent with planning"     Objective: Pt engaged in skilled OT treatment session with focus on fxnl cognition, short term memory, healthy coping education and leisure pursuits to increase engagement, endurance, tolerance, and independence with daily ADL and IADL tasks. CPT Code Minutes                                           Task Details        Therapeutic Activity  Completed self-rating questionnaire on executive functioning. Pt able to identify areas of improvement and cont areas of deficit. Pt with improved emotional management and improved use of strategies to assist with impaired memory. Pt cont with difficulties with pre-planning. Discussed setting time at end of each work day to review work tasks, identify outstanding tasks to be completed the next day, and review home/life tasks to better manage work load, work life balance and anxiety/ prevent feeling overwhelmed. Pt set an alarm in his phone for a daily remind to complete a review and plan to improve consistency. RTW: pt is I with identifying strategies but inconsistent with utilizing all strategies for optimal functioning. Discussed focusing on improving consistency of 1 strategy at a time; with using an established daily time to use his bullet journal/ planner for pre-planning and task organization. Neuro Re-Ed                 Therapeutic Exercise               Manual          HEP           Assessment: pt continues to do well at work; is using strategies and organization to manage memory deficits, as well as mental health, working on consistency of use of strategies.  Pt to benefit from cont skilled OT to ensure successful transition back to work as he assumes full job responsibilities.      Plan: Continued skilled OT per POC     INTERVENTION COMMENTS:  Diagnosis: Memory problem [R41.3]  Precautions: anxiety   Insurance: Payor: Saman Lorenzo / Plan: Saman Lorenzo PPO / Product Type: PPO /   Visits: 32  PN due:1/31/24

## 2023-11-21 NOTE — PROGRESS NOTES
Daily Note     Today's date: 2023  Patient name: Polo Armenta  : 1970  MRN: [id number]  Referring provider: Kelsy Becker DO  Dx:   Encounter Diagnosis     ICD-10-CM    1. History of concussion  Z87.820       2. Imbalance  R26.89       3. Cervicogenic headache  G44.86             Start Time: 1377  Stop Time: 928  Total time in clinic (min): 42 minutes    Subjective: Pt reports neck feeling okay today, just stiff. Had bad episode of numbness into neck and jaw last night laying down. Objective: See treatment diary below      Assessment:  Improvement in soft tissue mobility post manuals. Improving ROM with cervical exercises. Occasional reports of parasthesia during session into L sided neck and posterior head, triggers seem to be more L sidebend or R rotation. Discussed opening techniques to alleviate n/t. Patient would benefit from continued PT to improve cervical symptoms. Plan: Continue per plan of care.       OC EXPIRES On:  23  PRECAUTIONS:  None  CO-MORBIDITES:  Concussion  PERSONAL FACTORS:  None      Manuals 10/3 10/24 11/7 11/9 11/14 11/16 11/21        SOR AF  AF AF AF AF AF        Manual cervical traction AF  AF AF AF AF AF        1st rib mob               PA mobs (C3-C5)               MWM L rot               Unilateral PA pressure L side AF supine  AF supine AF supine AF supine AF supine AF supine        Periscapular TrP AF 8 min L periscap prone  AF 8min L periscap AF 6min L periscap AF 6min L periscap AF 6min L periscap AF 6min L periscapular        Neuro Re-Ed               Walking over hurdles w/ foam between               Tandem amb               Walking on foam w/ cone taps               Walking over hidden objects               HT amb               Foam               Agility ladder               Optokinetic training               Ther Ex               Pt education               UT stretch X10 bolster roll  X10 bolster roll  X2 30"           LS stretch   X10 bolster roll  X2 30"           Modified push up     X10 // bar          Lat stretch    Bilat x10 ea Bilat x10 ea Bilat x10 ea Bilaterally x10 ea        Cervical rotation SNAGs X10 overpressure              Scap protraction/retraction      X10 from // bar X10 from // bar        Extension/flexion cervical Bolster rolls x10  Bolster roll x10 Bolster roll x10  Bolster roll x10 ea direction Bolster roll x10 ea directoin        Thoracic rot Extension supine bolster x10  Extension supine bolster x10    1/2 kneel wall rotation x10 ea Extension supine bolster 10   X10 ea direction standing X10 ea direction X10 ea direction        Cervical ext SNAGs       X10 ea direction                       Lumbar side bend               Aerobic               Sled push               Ther Activity                                             Gait Training                                             Modalities

## 2023-11-21 NOTE — PROGRESS NOTES
Daily Note     Today's date: 2023  Patient name: Polo Armenta  : 1970  MRN: [id number]  Referring provider: Kelsy Becker DO  Dx:   Encounter Diagnosis     ICD-10-CM    1. Lumbar radiculopathy  M54.16                      Subjective: Pt states he wants to keep coming. Objective: See treatment diary below  FOTO given (_____)      Assessment: Pt presented to outpatient physical therapy at [facility] with complaints of R posterior hip, lateral thigh, knee and leg pain. He presented with decreased R ITB flexibility, poor R LE balance, poor R patellar mobility, decreased tolerance to activity and decreased functional mobility due to Iliotibial band syndrome, right leg (primary encounter diagnosis). His progression has been very good in PT with much better L LE flexibility and endurance. He was only able to ambulate x 100 yards when he started PT and can now walk up to 30 min. His R patellar mobility is greatly improved. He will continue to benefit from skilled PT services in order to address these deficits and reach maximum level of function but can reduce his frequency to 1x/wk. HE should be ready for D/C within the next few weeks. Pt continues to tolerated very well to manuals. Pt flexibility continues to improved. Plan: Continue per plan of care.     POC EXPIRES On:  23  PRECAUTIONS:  None  CO-MORBIDITES:  Concussion  PERSONAL FACTORS:  None      Manuals HEP     TFM L/R distal ITB supine  15 15 15    Patellar mobs R  5 5 5    Quad stretch R  5 5 5            Neuro Re-Ed         Bridges        S/L hip abduction L/R                FWD Lunge        LAT Lunge                        Ther Ex        Bike  Upright bike 10' Upright bike 10' Upright bike 10'    Quad stretch bilateral  10" 5 10" 5 10" 5    Supine LTR to L only   10" 10 10" 10    Cross leg stretch to L in supine   20" 5 20" 5    Seated Low Back flexion   10" 5 10" 5    Seated HS stretch L/R   10" 5 10" 5                    Ther Activity                        Gait Training                        Modalities

## 2023-11-28 ENCOUNTER — OFFICE VISIT (OUTPATIENT)
Facility: CLINIC | Age: 53
End: 2023-11-28
Payer: COMMERCIAL

## 2023-11-28 DIAGNOSIS — F41.9 ANXIETY AND DEPRESSION: ICD-10-CM

## 2023-11-28 DIAGNOSIS — Z87.820 HISTORY OF CONCUSSION: Primary | ICD-10-CM

## 2023-11-28 DIAGNOSIS — F32.A ANXIETY AND DEPRESSION: ICD-10-CM

## 2023-11-28 DIAGNOSIS — R26.89 IMBALANCE: ICD-10-CM

## 2023-11-28 DIAGNOSIS — R41.3 MEMORY PROBLEM: Primary | ICD-10-CM

## 2023-11-28 DIAGNOSIS — F43.10 POSTTRAUMATIC STRESS DISORDER: ICD-10-CM

## 2023-11-28 DIAGNOSIS — Z87.820 HISTORY OF CONCUSSION: ICD-10-CM

## 2023-11-28 DIAGNOSIS — G44.86 CERVICOGENIC HEADACHE: ICD-10-CM

## 2023-11-28 PROCEDURE — 97110 THERAPEUTIC EXERCISES: CPT

## 2023-11-28 PROCEDURE — 97140 MANUAL THERAPY 1/> REGIONS: CPT

## 2023-11-28 PROCEDURE — 97530 THERAPEUTIC ACTIVITIES: CPT

## 2023-11-28 NOTE — PROGRESS NOTES
Daily Note     Today's date: 2023  Patient name: Deonte Petty  : 1970  MRN: [id number]  Referring provider: Damaris Duong DO  Dx:   Encounter Diagnosis     ICD-10-CM    1. History of concussion  Z87.820       2. Imbalance  R26.89       3. Cervicogenic headache  G44.86             Start Time:   Stop Time:   Total time in clinic (min): 42 minutes    Subjective: Pt states neck mobility is good but having more neck pressure. Feels like it is coming from his shoulder and shoulder blade. Headache was probably a combination of chaos at work and looking at screen. Took shower and laid down for a nap/self care and it improved. Objective: See treatment diary below      Assessment:  Crepitus on shoulder does not appear to limit scapular mobility. Good cervical ROM observed with stretches and muscle energy techniques. Better thoracic ROM post exercises. Patient would benefit from continued PT to improve cervical symptoms. Plan: Continue per plan of care.       OC EXPIRES On:  23  PRECAUTIONS:  None  CO-MORBIDITES:  Concussion  PERSONAL FACTORS:  None      Manuals 10/3 10/24 11/7 11/9 11/14 11/16 11/21 11/28       SOR AF  AF AF AF AF AF AF       Manual cervical traction AF  AF AF AF AF AF AF       1st rib mob               PA mobs (C3-C5)               MWM L rot               Unilateral PA pressure L side AF supine  AF supine AF supine AF supine AF supine AF supine AF supine       Periscapular TrP AF 8 min L periscap prone  AF 8min L periscap AF 6min L periscap AF 6min L periscap AF 6min L periscap AF 6min L periscapular AF 6min L periscapular       Neuro Re-Ed               Walking over hurdles w/ foam between               Tandem amb               Walking on foam w/ cone taps               Walking over hidden objects               HT amb               Foam               Agility ladder               Optokinetic training               Ther Ex               Pt education               UT stretch X10 bolster roll  X10 bolster roll  X2 30"    X2 30"       LS stretch   X10 bolster roll  X2 30"    X2 30"       Modified push up     X10 // bar          BL IR stretch        20" x3       Lat stretch    Bilat x10 ea Bilat x10 ea Bilat x10 ea Bilaterally x10 ea Bilaterally x10 ea       Cervical rotation SNAGs X10 overpressure              Scap protraction/retraction      X10 from // bar X10 from // bar X10 from // bar       Extension/flexion cervical Bolster rolls x10  Bolster roll x10 Bolster roll x10  Bolster roll x10 ea direction Bolster roll x10 ea directoin Bolster roll x10 ea direction       Thoracic rot Extension supine bolster x10  Extension supine bolster x10    1/2 kneel wall rotation x10 ea Extension supine bolster 10   X10 ea direction standing X10 ea direction X10 ea direction X10 + extension/rot       Cervical ext SNAGs       X10 ea direction                       Lumbar side bend               Aerobic               Sled push               Ther Activity                                             Gait Training                                             Modalities

## 2023-11-28 NOTE — PROGRESS NOTES
Occupational Therapy Daily Note:    Today's date: 2023  Patient name: Barry Marshall  : 1970  MRN: [id number]  Referring provider: Clay Cowan DO  Dx:   Encounter Diagnoses   Name Primary? Memory problem Yes    History of concussion     Anxiety and depression     Posttraumatic stress disorder      Subjective: "There are definite improvements"     Objective: Pt engaged in skilled OT treatment session with focus on fxnl cognition, short term memory, healthy coping education and leisure pursuits to increase engagement, endurance, tolerance, and independence with daily ADL and IADL tasks. CPT Code Minutes                                           Task Details        Therapeutic Activity  RTW:  Planning: Identified need to pre-plan at last session. Pt has been utilizing a 430pm wrap up alarm to review tasks from the day and identify what needs to be carried over to the next day. Memory/ organizational strategies: pt is using his work calendar to keep track of all appts to ensure no overlap with outside appts and work tasks. Pt keeps a running to do list, including all work and life tasks. Pt now using the [employer] more as a master to do list and to assist with organizing important papers; does not use on a daily basis. Discussed simplifying and consolidating organizational tools to increase ease of use. Mental Health Management:   Work life balance. Pt reporting he finds himself eating later and later in the day, does not feel hungry. Discussed incorporating a self check alarm in the afternoon to include a food break, to increase consistency of eating on a daily basis. Pt also set a 230pm alarm for a self care check to review emotional and mental health and take a break as needed. Pt demo improved insight to when he needs to take a mental or physical break to prevent decline.    Discussed continuing to include weekly self care routines of stretching, leisure time engagement and mental health breaks. Neuro Re-Ed                 Therapeutic Exercise               Manual          HEP           Assessment: pt continues to do well at work; is using strategies and organization to manage memory deficits, as well as mental health, working on consistency of use of strategies. Pt to benefit from cont skilled OT to ensure successful transition back to work as he assumes full job responsibilities.      Plan: Continued skilled OT per POC     INTERVENTION COMMENTS:  Diagnosis: Memory problem [R41.3]  Precautions: anxiety   Insurance: Payor: Betty Bolden / Plan: Betty Bolden PPO / Product Type: PPO /   Visits: 33  PN due:1/31/24

## 2023-12-01 ENCOUNTER — APPOINTMENT (OUTPATIENT)
Facility: CLINIC | Age: 53
End: 2023-12-01
Payer: COMMERCIAL

## 2023-12-01 ENCOUNTER — OFFICE VISIT (OUTPATIENT)
Dept: PHYSICAL THERAPY | Facility: CLINIC | Age: 53
End: 2023-12-01
Payer: COMMERCIAL

## 2023-12-01 ENCOUNTER — OFFICE VISIT (OUTPATIENT)
Facility: CLINIC | Age: 53
End: 2023-12-01
Payer: COMMERCIAL

## 2023-12-01 DIAGNOSIS — Z87.820 HISTORY OF CONCUSSION: Primary | ICD-10-CM

## 2023-12-01 DIAGNOSIS — M54.16 LUMBAR RADICULOPATHY: Primary | ICD-10-CM

## 2023-12-01 DIAGNOSIS — R26.89 IMBALANCE: ICD-10-CM

## 2023-12-01 DIAGNOSIS — G44.86 CERVICOGENIC HEADACHE: ICD-10-CM

## 2023-12-01 PROCEDURE — 97140 MANUAL THERAPY 1/> REGIONS: CPT

## 2023-12-01 PROCEDURE — 97110 THERAPEUTIC EXERCISES: CPT

## 2023-12-01 NOTE — PROGRESS NOTES
Daily Note     Today's date: 2023  Patient name: Rosi Mazariegos  : 1970  MRN: [id number]  Referring provider: Mick Bonner DO  Dx:   Encounter Diagnosis     ICD-10-CM    1. History of concussion  Z87.820       2. Imbalance  R26.89       3. Cervicogenic headache  G44.86           Start Time: 935  Stop Time: 434  Total time in clinic (min): 40 minutes    Subjective: Reports that he is "doing pretty well actually". Once he has been getting the mobility back and muscle tension to release, he has been feeling better with less headache. "There then is a lot of stiffness and cracking - pain, numbness - down in to the jaw and the neck". Hasn't been able to find an exact cause for this. Objective: See treatment diary below      Assessment: Tolerated treatment well. Continues to benefit from manual therapy. High degrees of tension throughout R UT, L Ut, and L periscapular areas. New source of tension per patient bilateral lower cerv, upper thor paraspinal musculature - benefited from [facility] during session with reduction of tension and tightness. Patient demonstrated fatigue post treatment, exhibited good technique with therapeutic exercises, and would benefit from continued PT      Plan: Continue per plan of care. Progress treatment as tolerated.        OC EXPIRES On:  23  PRECAUTIONS:  None  CO-MORBIDITES:  Concussion  PERSONAL FACTORS:  None      Manuals 10/3 10/24 11/7 11/9 11/14 11/16 11/21 11/28 12/1      SOR AF  AF AF AF AF AF AF Mh       Manual cervical traction AF  AF AF AF AF AF AF MH       1st rib mob               PA mobs (C3-C5)         T/S P-A MH      MWM L rot               Unilateral PA pressure L side AF supine  AF supine AF supine AF supine AF supine AF supine AF supine MH supine      Periscapular TrP AF 8 min L periscap prone  AF 8min L periscap AF 6min L periscap AF 6min L periscap AF 6min L periscap AF 6min L periscapular AF 6min L periscapular MH L periscapular       Neuro Re-Ed Walking over hurdles w/ foam between               Tandem amb               Walking on foam w/ cone taps               Walking over hidden objects               HT amb               Foam               Agility ladder               Optokinetic training               Ther Ex               Pt education               UT stretch X10 bolster roll  X10 bolster roll  X2 30"    X2 30" X2 30"       LS stretch   X10 bolster roll  X2 30"    X2 30" X2 30"       Modified push up     X10 // bar          BL IR stretch        20" x3       Lat stretch    Bilat x10 ea Bilat x10 ea Bilat x10 ea Bilaterally x10 ea Bilaterally x10 ea X10 ea B       Cervical rotation SNAGs X10 overpressure              Scap protraction/retraction      X10 from // bar X10 from // bar X10 from // bar X10 from //bar       Extension/flexion cervical Bolster rolls x10  Bolster roll x10 Bolster roll x10  Bolster roll x10 ea direction Bolster roll x10 ea directoin Bolster roll x10 ea direction Bolster roll x10 ea direction      Thoracic rot Extension supine bolster x10  Extension supine bolster x10    1/2 kneel wall rotation x10 ea Extension supine bolster 10   X10 ea direction standing X10 ea direction X10 ea direction X10 + extension/rot X10 ex rot bolster      Cervical ext SNAGs       X10 ea direction                       Lumbar side bend               Aerobic               Sled push               Ther Activity                                             Gait Training                                             Modalities

## 2023-12-01 NOTE — PROGRESS NOTES
Daily Note     Today's date: 2023  Patient name: Rishabh Patel  : 1970  MRN: [id number]  Referring provider: Aiyln Reno DO  Dx:   Encounter Diagnosis     ICD-10-CM    1. Lumbar radiculopathy  M54.16                      Subjective: Pt states he wants to keep coming. Objective: See treatment diary below  FOTO given (_____)      Assessment: Pt presented to outpatient physical therapy at [facility] with complaints of R posterior hip, lateral thigh, knee and leg pain. He presented with decreased R ITB flexibility, poor R LE balance, poor R patellar mobility, decreased tolerance to activity and decreased functional mobility due to Iliotibial band syndrome, right leg (primary encounter diagnosis). His progression has been very good in PT with much better L LE flexibility and endurance. He was only able to ambulate x 100 yards when he started PT and can now walk up to 30 min. His R patellar mobility is greatly improved. He will continue to benefit from skilled PT services in order to address these deficits and reach maximum level of function but can reduce his frequency to 1x/wk. HE should be ready for D/C within the next few weeks. Pt continues to tolerated very well to manuals. Pt flexibility continues to improved. Pt is ready to be d/c to HEP stretches. Plan: Continue per plan of care.     POC EXPIRES On:  23  PRECAUTIONS:  None  CO-MORBIDITES:  Concussion  PERSONAL FACTORS:  None      Manuals HEP    TFM L/R distal ITB supine  15 15 15 15   Patellar mobs R  5 5 5 5   Quad stretch R  5 5 5 5           Neuro Re-Ed         Bridges        S/L hip abduction L/R                FWD Lunge        LAT Lunge                        Ther Ex        Bike  Upright bike 10' Upright bike 10' Upright bike 10' Upright 10'   Quad stretch bilateral  10" 5 10" 5 10" 5 10" 5   Supine LTR to L only   10" 10 10" 10 10" 10   Cross leg stretch to L in supine   20" 5 20" 5 20" 5   Seated Low Back flexion   10" 5 10" 5 10" 5   Seated HS stretch L/R   10" 5 10" 5 10" 5                   Ther Activity                        Gait Training                        Modalities

## 2023-12-12 ENCOUNTER — OFFICE VISIT (OUTPATIENT)
Dept: SLEEP CENTER | Facility: CLINIC | Age: 53
End: 2023-12-12
Payer: COMMERCIAL

## 2023-12-12 VITALS
SYSTOLIC BLOOD PRESSURE: 180 MMHG | HEIGHT: 73 IN | DIASTOLIC BLOOD PRESSURE: 84 MMHG | BODY MASS INDEX: 38.43 KG/M2 | HEART RATE: 66 BPM | WEIGHT: 290 LBS

## 2023-12-12 DIAGNOSIS — G47.33 OSA (OBSTRUCTIVE SLEEP APNEA): Primary | ICD-10-CM

## 2023-12-12 DIAGNOSIS — F32.A ANXIETY AND DEPRESSION: ICD-10-CM

## 2023-12-12 DIAGNOSIS — F41.9 ANXIETY AND DEPRESSION: ICD-10-CM

## 2023-12-12 PROCEDURE — 99204 OFFICE O/P NEW MOD 45 MIN: CPT | Performed by: PSYCHIATRY & NEUROLOGY

## 2023-12-12 NOTE — PROGRESS NOTES
Sleep Consultation   Jl Carreon 47 y.o. male MRN: [id number]      Reason for consultation: Management of mild VELIA    Requesting physician: Dr. Kristin Garcia    Assessment/Plan  Jl Carreon is a 52-year-old gentleman with PMH of mild VELIA, EDS, obesity, headaches, cervical spine disease, memory issues, cluster headaches, anxiety/depression, PTSD, vitamin D deficiency, history of multiple concussions, and history of COVID in 1/2020, who presents to the sleep medicine clinic for the management of mild VELIA. Home sleep study on 7/18/2023 revealed mild VELIA (GREG 8.4). Reevaluated for VELIA due worsening memory and neurologic issues. Weight of 290 lbs at that time. Poor sleep on the night of the sleep study per patient. Multiple prior in-lab sleep studies per patient (diagnostic and titration studies, last in ~2007 at [place], [place]). Educated the patient regarding the pathophysiology of VELIA. Discussed with patient the anticipated short-term (decreased snoring, nocturnal awakenings, and EDS) and long-term (decreased stroke, MI, and dementia risk) benefits of VELIA treatment. Ordered auto CPAP (5-15 cm H2O) via a full face mask to treat his mild VELIA given comorbidity of EDS. Recommend weight loss in conjunction with PCP. From description his parasomnias are more likely NREM parasomnias rather than REM parasomnia. Management of mild VELIA as above. Recommend patient to continue to follow-up with psychiatry/psychology and wean citalopram 40 mg daily with PCP as appropriate. No indication for pharmacological management such as clonazepam at this time. Would be hesitant to start a benzodiazepine given history of memory issues and imbalance. Family history of possible Parkinson's disease in mother - continue to follow-up with neurology and to monitor for any change in his parasomnias. EDS is likely multifactorial related to untreated VELIA,  pain, history of head trauma, mood disorder, and/or metabolic/nutrient abnormality.  Management of mild VELIA as above. Follow-up with neurology regarding history of head trauma. Follow-up with psychiatry/psychology and PCP regarding mood disorder. Neurology has prior ordered serum vitamin B12, vitamin D, and TSH labs. Follow-up in 1-3 months after initiation of PAP therapy (compliance check). Mild VELIA  -Home sleep study on 7/18/2023 revealed mild VELIA (GREG 8.4). Reevaluated for VELIA due worsening memory and neurologic issues. Weight of 290 lbs at that time. Poor sleep on the night of the sleep study per patient  -Multiple prior in-lab sleep studies per patient (diagnostic and titration studies, last in ~2007 at [place], [place])  -He had prior been on CPAP via a full face mask without issues (with improvement in snoring, non-refreshing sleep, and EDS)  -Ordered auto CPAP (5-15 cm H2O) via a full face mask to treat his mild VELIA given comorbidity of EDS   -Recommend weight loss in conjunction with PCP    NREM Parasomnias  -From description his parasomnias are more likely NREM parasomnias rather than REM parasomnia  -Management of mild VELIA as above  -Recommend patient to continue to follow-up with psychiatry/psychology and wean citalopram 40 mg daily with PCP as appropriate  -No indication for pharmacological management such as clonazepam at this time.  Would be hesitant to start a benzodiazepine given history of memory issues and imbalance  -Family history of possible Parkinson's disease in mother - continue to follow-up with neurology and to monitor for any change in his parasomnias  -Discussed with patient and fiance safety measures to take in the setting of parasomnias       EDS  -Likely multifactorial related to untreated VELIA,  pain, history of head trauma, mood disorder, and/or metabolic/nutrient abnormality  -Management of mild VELIA as above  -Follow-up with neurology regarding history of head trauma  -Follow-up with psychiatry/psychology and PCP regarding mood disorder  -Neurology has prior ordered serum vitamin B12, vitamin D, and TSH labs    HTN  -Elevated BP of 180/84 mm Hg on arrival to the clinic today - denied any associated symptoms. Manual recheck of 138/98 mm Hg  -Encouraged close follow-up with his PCP    Follow-up in 1-3 months after initiation of PAP therapy (compliance check)    Staffed and seen with Dr. Daniella Li on 12/12/2023    History of Present Illness   Manya Schilder is a 56-year-old gentleman with PMH of mild VELIA, EDS, obesity, headaches, cervical spine disease, memory issues, cluster headaches, anxiety/depression, PTSD, vitamin D deficiency, history of multiple concussions, and history of COVID in 1/2020, who presents to the sleep medicine clinic for the management of mild VELIA. Accompanied in the clinic today by his fiance. Home sleep study on 7/18/2023 revealed mild VELIA (GREG 8.4). Reevaluated for VELIA due worsening memory and neurologic issues. Weight of 290 lbs at that time. Poor sleep on the night of the sleep study per patient. Multiple prior in-lab sleep studies per patient (diagnostic and titration studies, last in ~2007 at [place], [place]). Chronic sinus issues. Denies history of prior stroke/TIA, seizures, heart disease, arrhythmia, TMJ, or asthma. Weight loss of ~10 lbs in the past year with diet and exercise. Reviewed with patient his PMH, PSH, medications, allergies, social history, and family history which are as documented    On evaluation, patient is sitting in chair and in no acute distress. Sleep schedule and symptoms as below. His snoring began in ~1991 and is stable per his fiance. Associated stable EDS. He had prior used CPAP over 10 years ago for ~2-3 years without issues (full face mask). He had improve snoring and EDS while on CPAP therapy. History of parasomnias (as described below) for many years which are worsening in terms of intensity and frequency per fiance. Denies any related injury. No distinct worsening of sleep symptoms due to prior head trauma or COVID infections. Intermittent constipation, left hand tremor (improved with cervical spine PT), and imbalance (denies any recent falls). Stable anxiety and depression. Frequent bilateral nasal congestion (no evaluation by ENT). Chronic mild RLE after a surgery. History of syncope in setting of severe coughing fits. Normal sense of smell. Denies chest pain, palpitations, SOB, SMITH, wheezing, or neuropathy. Compliant with citalopram 40 mg daily which PCP will be weaning in the coming months. 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 Takes a nap if he hits a "wall." Can sleep until the next day.  Prior had more routine naps which were refreshing (~15-45 minutes)  Total Sleep Time: ~8 hours  Prefers to sleep on his sides  Mouth breather    Sleep medications: denies, prior he used Ambien in ~2011 (no difference in energy)  Stimulant medications: denies  Caffeine use: yes, 12-16 oz of coffee per day in the AM (helps energy)  Alcohol use: denies  Tobacco use: yes, 1/2 PPD since ~1993 (helps energy)  Illicit drug use: denies     Sleep Disordered Breathing:  Snoring: yes, loudly (worse when supine)  Witnessed apneas: yes (worse when supine)  Shortness of breath or choking/gasping for air: yes  Morning dry mouth: yes  Morning headaches: denies  Non-refreshing sleep: yes  Nasal congestion: yes  Nocturia: yes, ~1 time per night (3 AM)    Other sleep related behaviors and symptoms:   Restless leg symptoms: denies, he will need to infrequently (~1-3 times per month) get out of bed due to feeling uncomfortable   Kicking legs during sleep: yes  Parasomnias: yes, sleep talking (sometimes coherent, most of the time is incoherent). ~2-3 times per week he will awaken in the middle of the night and the morning confused. ~2-3 times per week he will awaken "aggressive." Patient does not recall these events. Denies sleep walking (history of sleep walking in childhood)  Nightmares: yes, ~2-3 times per week (middle of night) associated tachycardia and diaphoresis. Patient does not recall any nightmares. May have been improved with CPAP (however, he had a lot of stress at that time)  Acid reflux during sleep: denies  Teeth grinding: yes, infrequent   Sleep paralysis, cataplexy, sleep attacks, or hypnagogic or hypnopompic hallucinations: denies  REM Behavioral Sleep Disorder: yes, alfred had been awoken in past with him "palming her face like a basketball." Noted to have bilateral UE and LE movements (single or complex) as well as side to side head movements. He does not recall any movements in relation to a dream. Brief episodes (few minutes) which can cluster. Denies dystonic postures or repetitive movements. Possibly minimally improved while on CPAP in the past    Daytime Symptoms:   Excessive daytime sleepiness: denies  Driving while drowsy: denies  History of motor vehicle accidents or near misses: denies  Cognitive problems: yes, issues with memory and concentration   Mood problems: yes, anxiety and depression (stable)  Problems at work, school or at home: denies    Lenox: 16    History of tonsillectomy: denies    Social History:    Family History:   Mother - dementia (possible Parkinson's disease) and similar sleep activity  Paternal grandmother - dementia   Children - sleep walking    Historical Information   Past Medical History:   Diagnosis Date   • Cluster headache    • Concussion      Past Surgical History:   Procedure Laterality Date   • ANKLE LIGAMENT RECONSTRUCTION Left    • KNEE SURGERY Right    • UMBILICAL HERNIA REPAIR     • UNDESCENDED TESTICLE EXPLORATION       No family history on file. Social History     Socioeconomic History   • Marital status: Single     Spouse name: Not on file   • Number of children: Not on file   • Years of education: Not on file   • Highest education level: Not on file   Occupational History   • Not on file   Tobacco Use   • Smoking status: Every Day     Types: Cigarettes   • Smokeless tobacco: Never   Vaping Use   • Vaping Use: Never used   Substance and Sexual Activity   • Alcohol use: Not Currently   • Drug use: Not Currently   • Sexual activity: Not on file   Other Topics Concern   • Not on file   Social History Narrative   • Not on file     Social Determinants of Health     Financial Resource Strain: Not on file   Food Insecurity: Not on file   Transportation Needs: Not on file   Physical Activity: Not on file   Stress: Not on file   Social Connections: Not on file   Intimate Partner Violence: Not on file   Housing Stability: Not on file     Meds/Allergies   Allergies   Allergen Reactions   • Penicillins Other (See Comments)     Rxn unknown, pt has had allergy since childhood     Home medications:  Prior to Admission medications    Medication Sig Start Date End Date Taking?  Authorizing Provider   Cholecalciferol (Vitamin D3) 50 MCG (2000 UT) TABS daily 4/19/23   Historical Provider, MD   citalopram (CeleXA) 40 mg tablet Take 40 mg by mouth daily 2/3/23   Historical Provider, MD   rizatriptan (Maxalt) 10 mg tablet Take 1 tablet (10 mg total) by mouth as needed for migraine Take at the onset of migraine; if symptoms continue or return, may take another dose at least 2 hours after first dose. Take no more than 2 doses in a day. 5/15/23   Carmella Jara DO     Vitals:   Blood pressure (!) 180/84, pulse 66, height 6' 1" (1.854 m), weight 132 kg (290 lb). Body mass index is 38.26 kg/m². Manual recheck BP: 138/98 mm Hg    Physical Exam:  General: Sitting in chair, awake, and alert. No acute distress  HEENT: Nares patent, no craniofacial abnormalities. Mucous membranes, moist, no oral lesions, and normal dentition. Mallampati class - III and bearded  NECK: Neck circumference - 20 inches. Trachea midline, no accessory muscle use, and no stridor   CARDIAC: Regular rate and rhythm  PULM: CTA bilaterally no wheezing, rhonchi or rales. No conversational dyspnea  EXT: No peripheral edema    NEURO: No focal neurologic deficits, moving all extremities appropriately, no resting or kinetic tremor, normal bilateral UE tone, and normal casual gait    Labs: I have personally reviewed pertinent lab results. No results found for: "WBC", "HGB", "HCT", "MCV", "PLT"   No results found for: "GLUCOSE", "CALCIUM", "NA", "K", "CO2", "CL", "BUN", "CREATININE"  No results found for: "IRON", "TIBC", "FERRITIN"  No results found for: "Rich[place] Gavin"  No results found for: "FOLATE"    Sleep studies  -Home sleep study on 7/18/2023 revealed mild VELIA (GREG 8.4). Weight of 290 lbs at that time                           MRI brain with and without contrast (NeuroQuant) on 6/7/2023  IMPRESSION:  Normal MRI of the brain  Incidentally noted paranasal sinus disease with no air-fluid levels to suggest acute sinusitis  NeuroQuant analysis was performed: Normal study;  Does not support neurodegeneration    Maria Elena Kramer DO  [facility]'s Sleep Fellow

## 2023-12-12 NOTE — PATIENT INSTRUCTIONS
The danger of acting out of dreams is injury to yourself or other. Strategies to help prevent injury  -Lowering the mattress or sleeping with the mattress on the floor  -Move furniture away from the bed that you could flail and hit. If there is furniture nearby, corners should be covered with foam cushioning (often available with child safety products)  -Make sure the floor is free of obstacles  -Ideally have a rug or carpet  -ideally shouldn't have guns at home If you must have them.  Must be locked in a different room, unloaded, ideally complicated lock on gun safe

## 2023-12-12 NOTE — PROGRESS NOTES
Assessment/Plan:    1. VELIA (obstructive sleep apnea)  -     CPAP Auto New DME    2. Anxiety and depression  -     CPAP Auto New DME          Subjective:      Patient ID: Amanda Jaquez is a 47 y.o. male. HPI    Was on CPAP years ago, it helped, not treated now  Has had neurological issues which prompted test  Accompanied by Curt Leo- she notes breathing pauses, he is restless, moves a lot, snores a lot, gags in sleep,   These have been present for years   Will sometimes throw arms around or kick in sleep.   Sometimes shakes head in sleep  Sits up in sleep unexpectedly  Talks in sleep  Has a lot of bad dreams  These occur most nights a week, worse with a bad day, present for years   Sometimes is disoriented when he wakes in the AM, can last 1 minute  Has not had sleepwalking as an adult    Feels sleepy, feels foggy and tired  Naps rarely   Dozes at movies    Rare hx of sinus infections  Nasal congestion b/l, has not seen ENT    [place] Sleepiness Scale  Sitting and reading: Slight chance of dozing  Watching TV: High chance of dozing  Sitting, inactive in a public place (e.g. a theatre or a meeting): High chance of dozing  As a passenger in a car for an hour without a break: High chance of dozing  Lying down to rest in the afternoon when circumstances permit: High chance of dozing  Sitting and talking to someone: Slight chance of dozing  Sitting quietly after a lunch without alcohol: Slight chance of dozing  In a car, while stopped for a few minutes in traffic: Would never doze  Total score: 15      Review of Systems    Anxiety and depression much better    Objective:      BP (!) 180/84 (BP Location: Left arm, Patient Position: Sitting, Cuff Size: Large)   Pulse 66   Ht 6' 1" (1.854 m)   Wt 132 kg (290 lb)   BMI 38.26 kg/m²          Physical Exam

## 2023-12-13 ENCOUNTER — TELEPHONE (OUTPATIENT)
Dept: SLEEP CENTER | Facility: CLINIC | Age: 53
End: 2023-12-13

## 2023-12-13 NOTE — TELEPHONE ENCOUNTER
DME set up 12/26/2023 in [place]. Rx for CPAP and clinicals sent to [facility] via [place].

## 2023-12-14 ENCOUNTER — OFFICE VISIT (OUTPATIENT)
Facility: CLINIC | Age: 53
End: 2023-12-14
Payer: COMMERCIAL

## 2023-12-14 DIAGNOSIS — F43.10 POSTTRAUMATIC STRESS DISORDER: ICD-10-CM

## 2023-12-14 DIAGNOSIS — R41.3 MEMORY PROBLEM: Primary | ICD-10-CM

## 2023-12-14 DIAGNOSIS — Z87.820 HISTORY OF CONCUSSION: ICD-10-CM

## 2023-12-14 DIAGNOSIS — F32.A ANXIETY AND DEPRESSION: ICD-10-CM

## 2023-12-14 DIAGNOSIS — F41.9 ANXIETY AND DEPRESSION: ICD-10-CM

## 2023-12-14 LAB
DME PARACHUTE DELIVERY DATE REQUESTED: NORMAL
DME PARACHUTE DELIVERY NOTE: NORMAL
DME PARACHUTE ITEM DESCRIPTION: NORMAL
DME PARACHUTE ORDER STATUS: NORMAL
DME PARACHUTE SUPPLIER NAME: NORMAL
DME PARACHUTE SUPPLIER PHONE: NORMAL

## 2023-12-14 PROCEDURE — 97530 THERAPEUTIC ACTIVITIES: CPT

## 2023-12-14 NOTE — PROGRESS NOTES
Occupational Therapy Daily Note:    Today's date: 2023  Patient name: Abhishek Gay  : 1970  MRN: [id number]  Referring provider: Chares Lombard, DO  Dx:   Encounter Diagnoses   Name Primary? Memory problem Yes    History of concussion     Anxiety and depression     Posttraumatic stress disorder        Subjective: "there was a lot that happened today. I am def handling things better"     Objective: Pt engaged in skilled OT treatment session with focus on fxnl cognition, short term memory, healthy coping education and leisure pursuits to increase engagement, tolerance, and independence with daily ADL/IADLs and RTW tasks    CPT Code Minutes                                           Task Details        Therapeutic Activity  Memory/ organizational strategies:   Pt cont with improved organization of work and life tasks; keeps a running to do list and reviews at end of day. pt reporting difficulty with recalling new events and last minute additions to work schedule. Pt stating he forgot a work meeting this morning that was last minute added into his schedule yesterday at end of day. Pt plans to keep his work phone by his bedside and check it first thing when he wakes up, to review any outstanding/ new work items. Suggested that if pt has any early morning changes to work schedule, to write it on a post it and put it on his phone, as a reminder when he first wakes. Pt to get CPAP machine within the next 2 weeks; anticipate improved sleep quality will also assist with decreasing fatigue and improving memory/ cognitive functioning. Mental Health Management:   Improving work life balance.   pt utilizing the following alarms:  Wake up alarm  3 self care check alarms, including a reminder to eat/ take a food break to increase consistency of eating on a daily basis  End of work day planning alarm  Wrap up for bed time alarm    Pt with improved response to life stressors and last minute disruptions to daily schedule. Pt with improved ability to regulate emotional response and manage anxiety. Neuro Re-Ed               Therapeutic Exercise               Manual          HEP           Assessment: pt continues to demonstrate improved emotional stability and improved ability to manage anxiety when handling unexpected and last minute events. Pt cont with improved organization and use of compensatory strategies to manage memory deficits. Pt to benefit from cont skilled OT to ensure successful transition back to work as he assumes full job responsibilities.      Plan: Continued skilled OT per POC     INTERVENTION COMMENTS:  Diagnosis: Memory problem [R41.3]  Precautions: anxiety   Insurance: Payor: Alice Duval / Plan: Alice Duval PPO / Product Type: PPO /   Visits: 34  PN due:1/31/24

## 2023-12-20 LAB
DME PARACHUTE DELIVERY DATE EXPECTED: NORMAL
DME PARACHUTE DELIVERY DATE REQUESTED: NORMAL
DME PARACHUTE DELIVERY NOTE: NORMAL
DME PARACHUTE ITEM DESCRIPTION: NORMAL
DME PARACHUTE ORDER STATUS: NORMAL
DME PARACHUTE SUPPLIER NAME: NORMAL
DME PARACHUTE SUPPLIER PHONE: NORMAL

## 2023-12-26 LAB
DME PARACHUTE DELIVERY DATE ACTUAL: NORMAL
DME PARACHUTE DELIVERY DATE EXPECTED: NORMAL
DME PARACHUTE DELIVERY DATE REQUESTED: NORMAL
DME PARACHUTE DELIVERY NOTE: NORMAL
DME PARACHUTE ITEM DESCRIPTION: NORMAL
DME PARACHUTE ORDER STATUS: NORMAL
DME PARACHUTE SUPPLIER NAME: NORMAL
DME PARACHUTE SUPPLIER PHONE: NORMAL

## 2023-12-28 ENCOUNTER — OFFICE VISIT (OUTPATIENT)
Facility: CLINIC | Age: 53
End: 2023-12-28
Payer: COMMERCIAL

## 2023-12-28 DIAGNOSIS — F41.9 ANXIETY AND DEPRESSION: ICD-10-CM

## 2023-12-28 DIAGNOSIS — F43.10 POSTTRAUMATIC STRESS DISORDER: ICD-10-CM

## 2023-12-28 DIAGNOSIS — R41.3 MEMORY PROBLEM: Primary | ICD-10-CM

## 2023-12-28 DIAGNOSIS — Z87.820 HISTORY OF CONCUSSION: ICD-10-CM

## 2023-12-28 DIAGNOSIS — F32.A ANXIETY AND DEPRESSION: ICD-10-CM

## 2023-12-28 PROCEDURE — 97530 THERAPEUTIC ACTIVITIES: CPT

## 2023-12-28 NOTE — PROGRESS NOTES
"Occupational Therapy Daily Note:    Today's date: 2023  Patient name: Vincenzo Simental  : 1970  MRN: [id number]  Referring provider: Marty Oconnell DO  Dx:   Encounter Diagnoses   Name Primary?    Memory problem Yes    History of concussion     Anxiety and depression     Posttraumatic stress disorder        Subjective: \"work is going really good\"     Objective: Pt engaged in skilled OT treatment session with focus on fxnl cognition, short term memory, healthy coping education and leisure pursuits to increase engagement, tolerance, and independence with daily ADL/IADLs and RTW tasks    CPT Code Minutes                                           Task Details        Therapeutic Activity  Memory/ organizational strategies:   Cont to improve organizational habits; completes frequent reviews of his schedule and to do lists. Cont to complete end of day review for work and life tasks. During busy times, pt has been keeping his work phone by his bedside, to check it in the morning and review any outstanding/ new work items.     CPAP: first night using was last night; pt's SO reporting increased restful sleep; improved quality of sleep; adjusting to face mask; will re-assess at next session the effect of improved sleep quality on fatigue levels and memory/ cognitive functioning.     Self-care/ mental health management:  -Pt using self-care alarm, including a reminder to eat; pt reporting increased consistency with eating and eating earlier in the day.   -pt does require reminders from his SO to take supplements consistently; working on re-association with another kitchen task to improve time consistency and decrease need for reminders  -pt reporting decreased occurrence of anxiety attacks in the morning; occurred only 1-2 times in last 2 weeks     RTW:  Pt reporting work continues to go well and he is managing all his responsibilities so far; has increased his attendance in meetings; working on evaluating work " organization and identifying methods to improve organization; also focusing on establishing connections with all his employees, including managing employees goals and forward progress with work. 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 Pt with G recall of employees names and projects in which they are involved. Pt has his 90 day review coming up next week.                        Neuro Re-Ed               Therapeutic Exercise               Manual          HEP           Assessment: pt continues to demo improving ability to manage life stressors, regulate emotional response and manage anxiety. Pt cont to successfully manage work responsibilities and compensatory strategies to manage memory deficits.  Pt to benefit from cont skilled OT to ensure successful transition back to work as he assumes full job responsibilities.     Plan: Continued skilled OT per POC     INTERVENTION COMMENTS:  Diagnosis: Memory problem [R41.3]  Precautions: anxiety   Insurance: Payor: [employer] / Plan: [employer] PPO / Product Type: PPO /   Visits: 35  PN due:1/31/24

## 2024-01-18 ENCOUNTER — EVALUATION (OUTPATIENT)
Facility: CLINIC | Age: 54
End: 2024-01-18
Payer: COMMERCIAL

## 2024-01-18 DIAGNOSIS — F32.A ANXIETY AND DEPRESSION: ICD-10-CM

## 2024-01-18 DIAGNOSIS — Z87.820 HISTORY OF CONCUSSION: ICD-10-CM

## 2024-01-18 DIAGNOSIS — F43.10 POSTTRAUMATIC STRESS DISORDER: ICD-10-CM

## 2024-01-18 DIAGNOSIS — F41.9 ANXIETY AND DEPRESSION: ICD-10-CM

## 2024-01-18 DIAGNOSIS — R41.3 MEMORY PROBLEM: Primary | ICD-10-CM

## 2024-01-18 PROCEDURE — 97168 OT RE-EVAL EST PLAN CARE: CPT

## 2024-01-18 PROCEDURE — 97530 THERAPEUTIC ACTIVITIES: CPT
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
## 2024-01-30 ENCOUNTER — EVALUATION (OUTPATIENT)
Facility: CLINIC | Age: 54
End: 2024-01-30
Payer: COMMERCIAL

## 2024-01-30 DIAGNOSIS — Z87.820 HISTORY OF CONCUSSION: Primary | ICD-10-CM

## 2024-01-30 DIAGNOSIS — G44.86 CERVICOGENIC HEADACHE: ICD-10-CM

## 2024-01-30 PROCEDURE — 97164 PT RE-EVAL EST PLAN CARE: CPT

## 2024-01-30 NOTE — PROGRESS NOTES
PT Re-Evaluation     Today's date: 2024  Patient name: Vincenzo Simental  : 1970  MRN: [id number]  Referring provider: Marty Oconnell DO  Dx:   Encounter Diagnosis     ICD-10-CM    1. History of concussion  Z87.820       2. Cervicogenic headache  G44.86           Start Time: 1500  Stop Time: 1542  Total time in clinic (min): 42 minutes    Assessment  Assessment details: Patient is a 53 y.o. year old male presenting to OPPT s/p diagnosis of cervicalgia, hx of concussion, and cervicogenic headache.  Vincenzo returns to OPPT with complaints of increased cervical pain, reduced cervical motion, and reduced postural endurance. He is having difficulty at work due to his cervical pain/lack of ROM and requirement of sitting long hours. Shows limited thoracic and cervical ROM, more limitations L vs R. Reports of n/t in cervical region and into posterior head + ear. Shows difficulty with scapular neuromuscular control which is likely influencing his cervical pain. Pt will benefit from skilled PT to address his deficits, discussed 1x/2weeks to address his above impairments.     Impairments: abnormal muscle tone, abnormal or restricted ROM, activity intolerance, impaired physical strength, lacks appropriate home exercise program, pain with function and scapular dyskinesis    Symptom irritability: moderate   Prognosis: good    Goals  Concussion Short Term Goals (4 weeks):  1. Patient will display improved cervical spine STM by 50% to encourage improved AROM during functional tasks  2. Patient will report HA </= 5 days per week    Concussion Long Term Goals (12 weeks):  1. Patient will be independent with complex HEP  2. Patient will report improved cervical pain during long sitting to improve work tolerance  3. Patient will report improved L cervical rotation to assist with driving and working        Plan  Patient would benefit from: skilled physical therapy  Planned therapy interventions: neuromuscular re-education,  manual therapy, patient education, home exercise program, therapeutic exercise, therapeutic activities and gait training  Frequency: 2x week  Duration in weeks: 8  Plan of Care beginning date: 2024  Plan of Care expiration date: 2024  Treatment plan discussed with: patient        Subjective Evaluation    History of Present Illness  Mechanism of injury: Present in PT: Pt reports slight exacerbation/decline of cervical motion especially toward the L side. Some episodes of n/t into back of head and ear. Trying to keep up on his mobility, has thought about returning to gym to do light lifting but has not done that yet. Neck pain and blocked ROM still occurring.    Goals: improve L sided neck and shoulder pain  Patient Goals  Patient goals for therapy: independence with ADLs/IADLs, increased motion, increased strength and decreased pain    Pain  Current pain ratin  At best pain ratin  At worst pain ratin  Location: cervical region L side into shoulder  Quality: burning  Relieving factors: change in position  Aggravating factors: sitting, overhead activity and lifting  Progression: worsening    Social Support  Lives in: multiple-level home  Lives with: spouse and adult children    Employment status: working  Treatments  Previous treatment: physical therapy and occupational therapy  Current treatment: occupational therapy        Objective     Postural Observations  Seated posture: fair    Additional Postural Observation Details  Slightly rounded shoulders    Active Range of Motion   Cervical/Thoracic Spine       Cervical    Flexion:  WFL  Extension:  Restriction level: minimal  Left lateral flexion:  Restriction level: minimal  Right lateral flexion:  Restriction level minimal  Left rotation:  Restriction level: moderate  Right rotation:  Restriction level: minimal    Thoracic    Flexion:  WFL  Extension:  Restriction level: moderate  Left lateral flexion:  Restriction level: moderate  Right lateral  flexion:  Restriction level: moderate  Left rotation:  Restriction level: moderate  Right rotation:  Restriction level: minimal    Joint Play     Hypomobile: C3, C4, C5, C7, T1, T2 and T3   Mechanical Assessment    Cervical    Seated Extension:   Pain location: peripheralized  Pain intensity: worse    Thoracic      Lumbar      Strength/Myotome Testing     Left Shoulder     Isolated Muscles   Lower trapezius: 4-   Middle trapezius: 5     Right Shoulder     Isolated Muscles   Lower trapezius: 4-   Middle trapezius: 5     Tests   Cervical     Left   Positive cervical flexion-rotation test .     Right   Negative cervical flexion-rotation test.           Pain Assessment      Headache Frequency: daily intermittent  Duration: short - seconds to minutes  Intensity:        Best:2        Worst:5        Average: 1  Location: L sided, up to ear/eye at times  Exacerbating Factors:  Relieving Factors:   Cervical 5     Ligament Laxity Testing:    Alar Ligament: WNL  Transverse Ligament: WNL    Modified VBI:    Cervical Palpation: Tight L SOR, hypomobile CPA C5-3, hypomobile upper thoracic CPA C7-T4  Cervical Posture: Slightly rounded shoulders           Precautions: None  Re-eval Date:  4/30/2024    Date 1/30       Visit Count 1       FOTO See IE       Pain In See IE       Pain Out                Testing                                                Neuro Re-Ed        Shoulder perturbations                                                        Ther Ex        Low row        Row        Thoracic windmills against wall        Thoracic extension        W's        Cervical isometrics        Pec stretch                Ther Activity        ADL                Gait Training        Divided Attention        Head turns        Manuals        Cervical prn

## 2024-02-08 ENCOUNTER — OFFICE VISIT (OUTPATIENT)
Facility: CLINIC | Age: 54
End: 2024-02-08
Payer: COMMERCIAL

## 2024-02-08 DIAGNOSIS — F41.9 ANXIETY AND DEPRESSION: ICD-10-CM

## 2024-02-08 DIAGNOSIS — R41.3 MEMORY PROBLEM: Primary | ICD-10-CM

## 2024-02-08 DIAGNOSIS — Z87.820 HISTORY OF CONCUSSION: ICD-10-CM

## 2024-02-08 DIAGNOSIS — F43.10 POSTTRAUMATIC STRESS DISORDER: ICD-10-CM

## 2024-02-08 DIAGNOSIS — F32.A ANXIETY AND DEPRESSION: ICD-10-CM

## 2024-02-08 PROCEDURE — 97530 THERAPEUTIC ACTIVITIES: CPT

## 2024-02-08 NOTE — PROGRESS NOTES
"Occupational Therapy Daily Note:    Today's date: 2024  Patient name: Vincenzo Simental  : 1970  MRN: [id number]  Referring provider: Marty Oconnell DO  Dx:   Encounter Diagnoses   Name Primary?    Memory problem Yes    History of concussion     Anxiety and depression     Posttraumatic stress disorder        Subjective: \"there is a line between taking a break and taking too long of a break. I am working on figuring out what that line is\"     Objective: Pt engaged in skilled OT treatment session with focus on fxnl cognition, short term memory, healthy coping education and leisure pursuits to increase engagement, tolerance, and independence with daily ADL/IADLs and RTW tasks    CPT Code Minutes                                           Task Details        Therapeutic Activity  Memory/ organizational strategies:   -continues to improve organizational habits. Completes review of daily schedule and to do lists for the next day. Pt reporting he has difficulty motivating himself to engage in tasks, and finds himself taking too long of breaks. Discussed identifying just 1 items from his to do list to tackle for each day, so the task list is not overwhelming.      CPAP: pt reporting improving quality of sleep and feeling more rested when he wakes; sleeping on average 6.5 hours/ night      Self-care/ mental health management:  -pt cont to use self-care alarms, including reminder to eat. Pt reporting he is eating more consistently  -pt stating he is better able to wake up in the morning without having anxiety and negative thoughts. Pt reporting he is better able to identify when he needs a self care rest break.  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         Pt identifying the continued areas of difficulty:  -Task initiation  -Fatigue   -Consistency with motivating self to engage in tasks and get things completed                          Neuro Re-Ed               Therapeutic Exercise               Manual          HEP           Assessment: pt cont with G job performance and management of job responsibilities. Pt cont to manage anxiety well and cont to implement self care strategies to monitor anxiety levels and take breaks as indicated.  Pt to benefit from cont skilled OT to ensure successful transition back to work as he assumes full job responsibilities.     Plan: Continued skilled OT per POC     INTERVENTION COMMENTS:  Diagnosis: Memory problem [R41.3]  Precautions:  Insurance: Payor: BLUE CROSS / Plan: [facility] [place] PLAN [id number] / Product Type: Blue Fee for Service /   Visits: 2 of 12 visits   PN due: 4/11/24

## 2024-03-14 ENCOUNTER — TELEPHONE (OUTPATIENT)
Dept: PSYCHIATRY | Facility: CLINIC | Age: 54
End: 2024-03-14

## 2024-03-14 NOTE — TELEPHONE ENCOUNTER
Reached out to patient in regards to Neuropsychology referral and of current wait-list   Left VM for pt to contact intake department.

## 2024-03-26 ENCOUNTER — OFFICE VISIT (OUTPATIENT)
Facility: CLINIC | Age: 54
End: 2024-03-26
Payer: COMMERCIAL

## 2024-03-26 DIAGNOSIS — Z87.820 HISTORY OF CONCUSSION: Primary | ICD-10-CM

## 2024-03-26 DIAGNOSIS — G44.86 CERVICOGENIC HEADACHE: ICD-10-CM

## 2024-03-26 PROCEDURE — 97110 THERAPEUTIC EXERCISES: CPT

## 2024-03-26 PROCEDURE — 97140 MANUAL THERAPY 1/> REGIONS: CPT

## 2024-03-26 NOTE — PROGRESS NOTES
Daily Note/progress note     Today's date: 3/26/2024  Patient name: Avel Simental  : 1970  MRN: [id number]  Referring provider: Marty Oconnell DO  Dx:   Encounter Diagnosis     ICD-10-CM    1. History of concussion  Z87.820       2. Cervicogenic headache  G44.86           Start Time: 0810  Stop Time: 0845  Total time in clinic (min): 35 minutes    Subjective: Avel reports improvement in his neck pain and n/t feeling in the back of his head. Still present but overall less occurrence. Has been consistent with his exercise program.      Objective: See treatment diary below    Avg pain: 2/10 cervical region ; less n/t    Goals  Concussion Short Term Goals (4 weeks):  1. Patient will display improved cervical spine STM by 50% to encourage improved AROM during functional tasks - met  2. Patient will report HA </= 5 days per week - met    Concussion Long Term Goals (12 weeks):  1. Patient will be independent with complex HEP - progressing  2. Patient will report improved cervical pain during long sitting to improve work tolerance - progressing  3. Patient will report improved L cervical rotation to assist with driving and working - progressing    Assessment: We discussed avel's improvement in his cervical pain and headaches. We will review his HEP next visit. Responds well to cervical manuals with improved soft tissue mobility and cervical ROM. Shows good form with minimal cues for therapeutic exercise, only minor adjustments needed. Requires demonstration for more complex ROM exercises. Patient would benefit from continued PT      Plan: Continue per plan of care. Potential dc nv.     Precautions: None  Re-eval Date:  2024    Date 1/30 3/26      Visit Count 1 2      FOTO See IE       Pain In See IE       Pain Out                Testing                                                Neuro Re-Ed        Shoulder perturbations                                                        Ther Ex        Low row  Red TB  5' x15      Row        Thoracic windmills against wall  X10 ea side      Thoracic extension  x20      W's        Cervical isometrics        Pec stretch        Levator scap stretch  30' x2      Ther Activity        ADL                Gait Training        Divided Attention        Head turns        Manuals        Cervical prn

## 2024-05-07 ENCOUNTER — OFFICE VISIT (OUTPATIENT)
Facility: CLINIC | Age: 54
End: 2024-05-07
Payer: COMMERCIAL

## 2024-05-07 DIAGNOSIS — Z87.820 HISTORY OF CONCUSSION: Primary | ICD-10-CM

## 2024-05-07 DIAGNOSIS — G44.86 CERVICOGENIC HEADACHE: ICD-10-CM

## 2024-05-07 PROCEDURE — 97140 MANUAL THERAPY 1/> REGIONS: CPT

## 2024-05-07 PROCEDURE — 97110 THERAPEUTIC EXERCISES: CPT

## 2024-05-07 NOTE — PROGRESS NOTES
Discharge Note     Today's date: 2024  Patient name: Vincenzo Simental  : 1970  MRN: [id number]  Referring provider: Marty Oconnell DO  Dx:   Encounter Diagnosis     ICD-10-CM    1. History of concussion  Z87.820       2. Cervicogenic headache  G44.86             Start Time: 0805  Stop Time: 0845  Total time in clinic (min): 40 minutes    Subjective: Vincenzo reports very minimal n/t into his head and his ROM and pain are much better. He feels like today can be his last day of PT. He reports being consistent with his HEP.      Objective: See treatment diary below    Avg pain: 10 cervical region ; min n/t    Goals  Concussion Short Term Goals (4 weeks):  1. Patient will display improved cervical spine STM by 50% to encourage improved AROM during functional tasks - met  2. Patient will report HA </= 5 days per week - met    Concussion Long Term Goals (12 weeks):  1. Patient will be independent with complex HEP - met  2. Patient will report improved cervical pain during long sitting to improve work tolerance - met  3. Patient will report improved L cervical rotation to assist with driving and working - met    Assessment: Pt reports improvement in his cervical pain and ROM as well as numbness and tingling into his posterior head. Vincenzo is diligent with his HEP, and feels comfortable being discharged today. Patient and PT mutually agree to discharge from skilled PT. Goals met at this time.      Plan: D/c PT.     Precautions: None  Re-eval Date:  2024    Date 1/30 3/26 5/7     Visit Count 1 2 3     FOTO See IE       Pain In See IE       Pain Out                Testing                                                Neuro Re-Ed        Shoulder perturbations                                                        Ther Ex        Low row  Red TB 5' x15      Row        Cervical flex + rot   x20     Thoracic windmills against wall  X10 ea side      Thoracic extension  x20 x20     W's        Cervical isometrics         Pec stretch        Levator scap stretch  30' x2      Ther Activity        ADL                Gait Training        Divided Attention        Head turns        Manuals        Cervical prn  STM BL upper trap, L periscapular STM

## 2024-05-21 ENCOUNTER — APPOINTMENT (OUTPATIENT)
Facility: CLINIC | Age: 54
End: 2024-05-21
Payer: COMMERCIAL